# Patient Record
Sex: MALE | Race: WHITE | Employment: OTHER | URBAN - METROPOLITAN AREA
[De-identification: names, ages, dates, MRNs, and addresses within clinical notes are randomized per-mention and may not be internally consistent; named-entity substitution may affect disease eponyms.]

---

## 2021-09-01 ENCOUNTER — APPOINTMENT (OUTPATIENT)
Dept: CT IMAGING | Age: 76
DRG: 640 | End: 2021-09-01
Payer: MEDICARE

## 2021-09-01 ENCOUNTER — HOSPITAL ENCOUNTER (INPATIENT)
Age: 76
LOS: 2 days | Discharge: HOME | DRG: 640 | End: 2021-09-03
Attending: STUDENT IN AN ORGANIZED HEALTH CARE EDUCATION/TRAINING PROGRAM | Admitting: INTERNAL MEDICINE
Payer: MEDICARE

## 2021-09-01 ENCOUNTER — APPOINTMENT (OUTPATIENT)
Dept: GENERAL RADIOLOGY | Age: 76
DRG: 640 | End: 2021-09-01
Payer: MEDICARE

## 2021-09-01 DIAGNOSIS — R41.82 ALTERED MENTAL STATUS, UNSPECIFIED ALTERED MENTAL STATUS TYPE: Primary | ICD-10-CM

## 2021-09-01 DIAGNOSIS — E87.1 HYPONATREMIA: ICD-10-CM

## 2021-09-01 LAB
A/G RATIO: 1.4 (ref 1.1–2.2)
ALBUMIN SERPL-MCNC: 4.5 G/DL (ref 3.4–5)
ALP BLD-CCNC: 70 U/L (ref 40–129)
ALT SERPL-CCNC: 15 U/L (ref 10–40)
AMMONIA: 43 UMOL/L (ref 16–60)
AMPHETAMINE SCREEN, URINE: NORMAL
ANION GAP SERPL CALCULATED.3IONS-SCNC: 13 MMOL/L (ref 3–16)
ANION GAP SERPL CALCULATED.3IONS-SCNC: 14 MMOL/L (ref 3–16)
AST SERPL-CCNC: 30 U/L (ref 15–37)
BARBITURATE SCREEN URINE: NORMAL
BASE EXCESS VENOUS: -0.7 MMOL/L (ref -3–3)
BASOPHILS ABSOLUTE: 0 K/UL (ref 0–0.2)
BASOPHILS RELATIVE PERCENT: 0.4 %
BENZODIAZEPINE SCREEN, URINE: NORMAL
BILIRUB SERPL-MCNC: 0.8 MG/DL (ref 0–1)
BILIRUBIN URINE: NEGATIVE
BLOOD, URINE: NEGATIVE
BUN BLDV-MCNC: 13 MG/DL (ref 7–20)
BUN BLDV-MCNC: 9 MG/DL (ref 7–20)
CALCIUM SERPL-MCNC: 9.1 MG/DL (ref 8.3–10.6)
CALCIUM SERPL-MCNC: 9.5 MG/DL (ref 8.3–10.6)
CANNABINOID SCREEN URINE: NORMAL
CARBOXYHEMOGLOBIN: 1.9 % (ref 0–1.5)
CHLORIDE BLD-SCNC: 84 MMOL/L (ref 99–110)
CHLORIDE BLD-SCNC: 90 MMOL/L (ref 99–110)
CLARITY: CLEAR
CO2: 24 MMOL/L (ref 21–32)
CO2: 25 MMOL/L (ref 21–32)
COCAINE METABOLITE SCREEN URINE: NORMAL
COLOR: YELLOW
CREAT SERPL-MCNC: 0.6 MG/DL (ref 0.8–1.3)
CREAT SERPL-MCNC: 0.7 MG/DL (ref 0.8–1.3)
EKG ATRIAL RATE: 108 BPM
EKG DIAGNOSIS: NORMAL
EKG P AXIS: 29 DEGREES
EKG P-R INTERVAL: 180 MS
EKG Q-T INTERVAL: 348 MS
EKG QRS DURATION: 116 MS
EKG QTC CALCULATION (BAZETT): 466 MS
EKG R AXIS: 42 DEGREES
EKG T AXIS: 34 DEGREES
EKG VENTRICULAR RATE: 108 BPM
EOSINOPHILS ABSOLUTE: 0 K/UL (ref 0–0.6)
EOSINOPHILS RELATIVE PERCENT: 0 %
ETHANOL: NORMAL MG/DL (ref 0–0.08)
FOLATE: 15.51 NG/ML (ref 4.78–24.2)
GFR AFRICAN AMERICAN: >60
GFR AFRICAN AMERICAN: >60
GFR NON-AFRICAN AMERICAN: >60
GFR NON-AFRICAN AMERICAN: >60
GLOBULIN: 3.2 G/DL
GLUCOSE BLD-MCNC: 129 MG/DL (ref 70–99)
GLUCOSE BLD-MCNC: 143 MG/DL (ref 70–99)
GLUCOSE URINE: NEGATIVE MG/DL
HCO3 VENOUS: 23.1 MMOL/L (ref 23–29)
HCT VFR BLD CALC: 37.3 % (ref 40.5–52.5)
HEMOGLOBIN: 12.8 G/DL (ref 13.5–17.5)
INFLUENZA A: NOT DETECTED
INFLUENZA B: NOT DETECTED
KETONES, URINE: 15 MG/DL
LEUKOCYTE ESTERASE, URINE: NEGATIVE
LYMPHOCYTES ABSOLUTE: 0.2 K/UL (ref 1–5.1)
LYMPHOCYTES RELATIVE PERCENT: 3.9 %
Lab: NORMAL
MCH RBC QN AUTO: 34.6 PG (ref 26–34)
MCHC RBC AUTO-ENTMCNC: 34.5 G/DL (ref 31–36)
MCV RBC AUTO: 100.2 FL (ref 80–100)
METHADONE SCREEN, URINE: NORMAL
METHEMOGLOBIN VENOUS: 0.1 %
MICROSCOPIC EXAMINATION: ABNORMAL
MONOCYTES ABSOLUTE: 0.8 K/UL (ref 0–1.3)
MONOCYTES RELATIVE PERCENT: 13.4 %
NEUTROPHILS ABSOLUTE: 4.6 K/UL (ref 1.7–7.7)
NEUTROPHILS RELATIVE PERCENT: 82.3 %
NITRITE, URINE: NEGATIVE
O2 SAT, VEN: 97 %
O2 THERAPY: ABNORMAL
OPIATE SCREEN URINE: NORMAL
OXYCODONE URINE: NORMAL
PCO2, VEN: 35.8 MMHG (ref 40–50)
PDW BLD-RTO: 13.2 % (ref 12.4–15.4)
PH UA: 6.5
PH UA: 6.5 (ref 5–8)
PH VENOUS: 7.43 (ref 7.35–7.45)
PHENCYCLIDINE SCREEN URINE: NORMAL
PLATELET # BLD: 208 K/UL (ref 135–450)
PMV BLD AUTO: 6.4 FL (ref 5–10.5)
PO2, VEN: 84.7 MMHG (ref 25–40)
POTASSIUM REFLEX MAGNESIUM: 3.8 MMOL/L (ref 3.5–5.1)
POTASSIUM REFLEX MAGNESIUM: 4.3 MMOL/L (ref 3.5–5.1)
PROPOXYPHENE SCREEN: NORMAL
PROTEIN UA: NEGATIVE MG/DL
RBC # BLD: 3.72 M/UL (ref 4.2–5.9)
SARS-COV-2 RNA, RT PCR: NOT DETECTED
SARS-COV-2, NAAT: NOT DETECTED
SODIUM BLD-SCNC: 123 MMOL/L (ref 136–145)
SODIUM BLD-SCNC: 127 MMOL/L (ref 136–145)
SODIUM URINE: 57 MMOL/L
SPECIFIC GRAVITY UA: 1.01 (ref 1–1.03)
TCO2 CALC VENOUS: 24 MMOL/L
TOTAL PROTEIN: 7.7 G/DL (ref 6.4–8.2)
TROPONIN: <0.01 NG/ML
TSH REFLEX: 1.32 UIU/ML (ref 0.27–4.2)
URIC ACID, SERUM: 6.5 MG/DL (ref 3.5–7.2)
URINE TYPE: ABNORMAL
UROBILINOGEN, URINE: 0.2 E.U./DL
VITAMIN B-12: 694 PG/ML (ref 211–911)
WBC # BLD: 5.6 K/UL (ref 4–11)

## 2021-09-01 PROCEDURE — 82803 BLOOD GASES ANY COMBINATION: CPT

## 2021-09-01 PROCEDURE — 80053 COMPREHEN METABOLIC PANEL: CPT

## 2021-09-01 PROCEDURE — 84550 ASSAY OF BLOOD/URIC ACID: CPT

## 2021-09-01 PROCEDURE — 96360 HYDRATION IV INFUSION INIT: CPT

## 2021-09-01 PROCEDURE — 84484 ASSAY OF TROPONIN QUANT: CPT

## 2021-09-01 PROCEDURE — 6370000000 HC RX 637 (ALT 250 FOR IP): Performed by: NURSE PRACTITIONER

## 2021-09-01 PROCEDURE — 6370000000 HC RX 637 (ALT 250 FOR IP): Performed by: PHYSICIAN ASSISTANT

## 2021-09-01 PROCEDURE — 99222 1ST HOSP IP/OBS MODERATE 55: CPT | Performed by: NURSE PRACTITIONER

## 2021-09-01 PROCEDURE — 82077 ASSAY SPEC XCP UR&BREATH IA: CPT

## 2021-09-01 PROCEDURE — 6360000002 HC RX W HCPCS: Performed by: PHYSICIAN ASSISTANT

## 2021-09-01 PROCEDURE — 80307 DRUG TEST PRSMV CHEM ANLYZR: CPT

## 2021-09-01 PROCEDURE — 82607 VITAMIN B-12: CPT

## 2021-09-01 PROCEDURE — 2580000003 HC RX 258: Performed by: STUDENT IN AN ORGANIZED HEALTH CARE EDUCATION/TRAINING PROGRAM

## 2021-09-01 PROCEDURE — 87636 SARSCOV2 & INF A&B AMP PRB: CPT

## 2021-09-01 PROCEDURE — 72125 CT NECK SPINE W/O DYE: CPT

## 2021-09-01 PROCEDURE — 87635 SARS-COV-2 COVID-19 AMP PRB: CPT

## 2021-09-01 PROCEDURE — 93010 ELECTROCARDIOGRAM REPORT: CPT | Performed by: INTERNAL MEDICINE

## 2021-09-01 PROCEDURE — 70450 CT HEAD/BRAIN W/O DYE: CPT

## 2021-09-01 PROCEDURE — 2580000003 HC RX 258: Performed by: PHYSICIAN ASSISTANT

## 2021-09-01 PROCEDURE — 71045 X-RAY EXAM CHEST 1 VIEW: CPT

## 2021-09-01 PROCEDURE — 6370000000 HC RX 637 (ALT 250 FOR IP): Performed by: STUDENT IN AN ORGANIZED HEALTH CARE EDUCATION/TRAINING PROGRAM

## 2021-09-01 PROCEDURE — 85025 COMPLETE CBC W/AUTO DIFF WBC: CPT

## 2021-09-01 PROCEDURE — 82140 ASSAY OF AMMONIA: CPT

## 2021-09-01 PROCEDURE — 84300 ASSAY OF URINE SODIUM: CPT

## 2021-09-01 PROCEDURE — 36415 COLL VENOUS BLD VENIPUNCTURE: CPT

## 2021-09-01 PROCEDURE — 99283 EMERGENCY DEPT VISIT LOW MDM: CPT

## 2021-09-01 PROCEDURE — 2060000000 HC ICU INTERMEDIATE R&B

## 2021-09-01 PROCEDURE — 93005 ELECTROCARDIOGRAM TRACING: CPT | Performed by: STUDENT IN AN ORGANIZED HEALTH CARE EDUCATION/TRAINING PROGRAM

## 2021-09-01 PROCEDURE — 84443 ASSAY THYROID STIM HORMONE: CPT

## 2021-09-01 PROCEDURE — 81003 URINALYSIS AUTO W/O SCOPE: CPT

## 2021-09-01 PROCEDURE — 82746 ASSAY OF FOLIC ACID SERUM: CPT

## 2021-09-01 RX ORDER — PRAVASTATIN SODIUM 40 MG
40 TABLET ORAL DAILY
Status: DISCONTINUED | OUTPATIENT
Start: 2021-09-01 | End: 2021-09-03 | Stop reason: HOSPADM

## 2021-09-01 RX ORDER — ATENOLOL 25 MG/1
25 TABLET ORAL DAILY
COMMUNITY

## 2021-09-01 RX ORDER — ONDANSETRON 2 MG/ML
4 INJECTION INTRAMUSCULAR; INTRAVENOUS EVERY 6 HOURS PRN
Status: DISCONTINUED | OUTPATIENT
Start: 2021-09-01 | End: 2021-09-03 | Stop reason: HOSPADM

## 2021-09-01 RX ORDER — SODIUM CHLORIDE 0.9 % (FLUSH) 0.9 %
5-40 SYRINGE (ML) INJECTION PRN
Status: DISCONTINUED | OUTPATIENT
Start: 2021-09-01 | End: 2021-09-03 | Stop reason: HOSPADM

## 2021-09-01 RX ORDER — ONDANSETRON 4 MG/1
4 TABLET, ORALLY DISINTEGRATING ORAL EVERY 8 HOURS PRN
Status: DISCONTINUED | OUTPATIENT
Start: 2021-09-01 | End: 2021-09-03 | Stop reason: HOSPADM

## 2021-09-01 RX ORDER — MAGNESIUM SULFATE IN WATER 40 MG/ML
2000 INJECTION, SOLUTION INTRAVENOUS PRN
Status: DISCONTINUED | OUTPATIENT
Start: 2021-09-01 | End: 2021-09-03 | Stop reason: HOSPADM

## 2021-09-01 RX ORDER — ACETAMINOPHEN 500 MG
1000 TABLET ORAL ONCE
Status: COMPLETED | OUTPATIENT
Start: 2021-09-01 | End: 2021-09-01

## 2021-09-01 RX ORDER — SODIUM CHLORIDE 9 MG/ML
25 INJECTION, SOLUTION INTRAVENOUS PRN
Status: DISCONTINUED | OUTPATIENT
Start: 2021-09-01 | End: 2021-09-03 | Stop reason: HOSPADM

## 2021-09-01 RX ORDER — LISINOPRIL 20 MG/1
20 TABLET ORAL DAILY
Status: ON HOLD | COMMUNITY
End: 2021-09-03 | Stop reason: HOSPADM

## 2021-09-01 RX ORDER — LORAZEPAM 2 MG/1
4 TABLET ORAL
Status: DISCONTINUED | OUTPATIENT
Start: 2021-09-01 | End: 2021-09-03 | Stop reason: HOSPADM

## 2021-09-01 RX ORDER — POTASSIUM CHLORIDE 7.45 MG/ML
10 INJECTION INTRAVENOUS PRN
Status: DISCONTINUED | OUTPATIENT
Start: 2021-09-01 | End: 2021-09-03 | Stop reason: HOSPADM

## 2021-09-01 RX ORDER — ACETAMINOPHEN 650 MG/1
650 SUPPOSITORY RECTAL EVERY 6 HOURS PRN
Status: DISCONTINUED | OUTPATIENT
Start: 2021-09-01 | End: 2021-09-03 | Stop reason: HOSPADM

## 2021-09-01 RX ORDER — ALBUTEROL SULFATE 90 UG/1
2 AEROSOL, METERED RESPIRATORY (INHALATION) EVERY 6 HOURS PRN
COMMUNITY

## 2021-09-01 RX ORDER — ATENOLOL 25 MG/1
25 TABLET ORAL DAILY
Status: DISCONTINUED | OUTPATIENT
Start: 2021-09-01 | End: 2021-09-03 | Stop reason: HOSPADM

## 2021-09-01 RX ORDER — AMLODIPINE BESYLATE 5 MG/1
5 TABLET ORAL DAILY
Status: ON HOLD | COMMUNITY
End: 2021-09-03 | Stop reason: HOSPADM

## 2021-09-01 RX ORDER — SODIUM CHLORIDE 9 MG/ML
INJECTION, SOLUTION INTRAVENOUS CONTINUOUS
Status: DISCONTINUED | OUTPATIENT
Start: 2021-09-01 | End: 2021-09-03

## 2021-09-01 RX ORDER — LORAZEPAM 2 MG/ML
3 INJECTION INTRAMUSCULAR
Status: DISCONTINUED | OUTPATIENT
Start: 2021-09-01 | End: 2021-09-03 | Stop reason: HOSPADM

## 2021-09-01 RX ORDER — POTASSIUM CHLORIDE 20 MEQ/1
40 TABLET, EXTENDED RELEASE ORAL PRN
Status: DISCONTINUED | OUTPATIENT
Start: 2021-09-01 | End: 2021-09-03 | Stop reason: HOSPADM

## 2021-09-01 RX ORDER — POLYETHYLENE GLYCOL 3350 17 G/17G
17 POWDER, FOR SOLUTION ORAL DAILY PRN
Status: DISCONTINUED | OUTPATIENT
Start: 2021-09-01 | End: 2021-09-03 | Stop reason: HOSPADM

## 2021-09-01 RX ORDER — LANOLIN ALCOHOL/MO/W.PET/CERES
100 CREAM (GRAM) TOPICAL DAILY
Status: DISCONTINUED | OUTPATIENT
Start: 2021-09-01 | End: 2021-09-03 | Stop reason: HOSPADM

## 2021-09-01 RX ORDER — 0.9 % SODIUM CHLORIDE 0.9 %
1000 INTRAVENOUS SOLUTION INTRAVENOUS ONCE
Status: COMPLETED | OUTPATIENT
Start: 2021-09-01 | End: 2021-09-01

## 2021-09-01 RX ORDER — ACETAMINOPHEN 325 MG/1
650 TABLET ORAL EVERY 6 HOURS PRN
Status: DISCONTINUED | OUTPATIENT
Start: 2021-09-01 | End: 2021-09-03 | Stop reason: HOSPADM

## 2021-09-01 RX ORDER — LORAZEPAM 2 MG/ML
1 INJECTION INTRAMUSCULAR
Status: DISCONTINUED | OUTPATIENT
Start: 2021-09-01 | End: 2021-09-03 | Stop reason: HOSPADM

## 2021-09-01 RX ORDER — SODIUM CHLORIDE 0.9 % (FLUSH) 0.9 %
5-40 SYRINGE (ML) INJECTION EVERY 12 HOURS SCHEDULED
Status: DISCONTINUED | OUTPATIENT
Start: 2021-09-01 | End: 2021-09-03 | Stop reason: HOSPADM

## 2021-09-01 RX ORDER — M-VIT,TX,IRON,MINS/CALC/FOLIC 27MG-0.4MG
1 TABLET ORAL DAILY
Status: DISCONTINUED | OUTPATIENT
Start: 2021-09-01 | End: 2021-09-03 | Stop reason: HOSPADM

## 2021-09-01 RX ORDER — PRAVASTATIN SODIUM 40 MG
40 TABLET ORAL DAILY
COMMUNITY

## 2021-09-01 RX ORDER — LORAZEPAM 1 MG/1
1 TABLET ORAL
Status: DISCONTINUED | OUTPATIENT
Start: 2021-09-01 | End: 2021-09-03 | Stop reason: HOSPADM

## 2021-09-01 RX ORDER — LORAZEPAM 2 MG/ML
2 INJECTION INTRAMUSCULAR
Status: DISCONTINUED | OUTPATIENT
Start: 2021-09-01 | End: 2021-09-03 | Stop reason: HOSPADM

## 2021-09-01 RX ORDER — LORAZEPAM 2 MG/1
2 TABLET ORAL
Status: DISCONTINUED | OUTPATIENT
Start: 2021-09-01 | End: 2021-09-03 | Stop reason: HOSPADM

## 2021-09-01 RX ORDER — LORAZEPAM 2 MG/ML
4 INJECTION INTRAMUSCULAR
Status: DISCONTINUED | OUTPATIENT
Start: 2021-09-01 | End: 2021-09-03 | Stop reason: HOSPADM

## 2021-09-01 RX ORDER — ALBUTEROL SULFATE 90 UG/1
2 AEROSOL, METERED RESPIRATORY (INHALATION) EVERY 6 HOURS PRN
Status: DISCONTINUED | OUTPATIENT
Start: 2021-09-01 | End: 2021-09-03 | Stop reason: HOSPADM

## 2021-09-01 RX ADMIN — SODIUM CHLORIDE 1000 ML: 9 INJECTION, SOLUTION INTRAVENOUS at 09:41

## 2021-09-01 RX ADMIN — Medication 100 MG: at 17:32

## 2021-09-01 RX ADMIN — PRAVASTATIN SODIUM 40 MG: 40 TABLET ORAL at 17:32

## 2021-09-01 RX ADMIN — SODIUM CHLORIDE: 9 INJECTION, SOLUTION INTRAVENOUS at 15:46

## 2021-09-01 RX ADMIN — ACETAMINOPHEN 650 MG: 325 TABLET ORAL at 17:32

## 2021-09-01 RX ADMIN — ATENOLOL 25 MG: 25 TABLET ORAL at 17:32

## 2021-09-01 RX ADMIN — ACETAMINOPHEN 1000 MG: 500 TABLET ORAL at 12:48

## 2021-09-01 RX ADMIN — ENOXAPARIN SODIUM 40 MG: 40 INJECTION SUBCUTANEOUS at 17:32

## 2021-09-01 RX ADMIN — MULTIPLE VITAMINS W/ MINERALS TAB 1 TABLET: TAB at 17:33

## 2021-09-01 ASSESSMENT — PAIN SCALES - GENERAL
PAINLEVEL_OUTOF10: 1
PAINLEVEL_OUTOF10: 9
PAINLEVEL_OUTOF10: 4
PAINLEVEL_OUTOF10: 0
PAINLEVEL_OUTOF10: 3

## 2021-09-01 NOTE — ED NOTES
1054- Perfect serve sent to Dr. Mary Kay Adrian for admission      [facility]  09/01/21 1056      Call was returned by Pan Luciano NP and spoke to Dr. Stephanie Jameson  09/01/21 0879

## 2021-09-01 NOTE — PROGRESS NOTES
Patient transferred from ED to room 317. Positioned for comfort in bed. Four eyes assessment complete. No impaired skin integrity or open area. Scattered bruising and blanchable redness to coccyx noted. Assessment complete. Patient oriented to room. VS obtained. Call light in reach. Bed alarm on.

## 2021-09-01 NOTE — Clinical Note
Patient Class: Inpatient [101]   REQUIRED: Diagnosis: Hyponatremia [291572]   Estimated Length of Stay: Estimated stay of more than 2 midnights   Telemetry/Cardiac Monitoring Required?: Yes
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
## 2021-09-02 LAB
ANION GAP SERPL CALCULATED.3IONS-SCNC: 10 MMOL/L (ref 3–16)
ANION GAP SERPL CALCULATED.3IONS-SCNC: 14 MMOL/L (ref 3–16)
ANION GAP SERPL CALCULATED.3IONS-SCNC: 9 MMOL/L (ref 3–16)
BASOPHILS ABSOLUTE: 0 K/UL (ref 0–0.2)
BASOPHILS RELATIVE PERCENT: 0.7 %
BUN BLDV-MCNC: 10 MG/DL (ref 7–20)
BUN BLDV-MCNC: 8 MG/DL (ref 7–20)
BUN BLDV-MCNC: 9 MG/DL (ref 7–20)
CALCIUM SERPL-MCNC: 8.6 MG/DL (ref 8.3–10.6)
CALCIUM SERPL-MCNC: 8.8 MG/DL (ref 8.3–10.6)
CALCIUM SERPL-MCNC: 9 MG/DL (ref 8.3–10.6)
CHLORIDE BLD-SCNC: 92 MMOL/L (ref 99–110)
CHLORIDE BLD-SCNC: 94 MMOL/L (ref 99–110)
CHLORIDE BLD-SCNC: 95 MMOL/L (ref 99–110)
CO2: 19 MMOL/L (ref 21–32)
CO2: 23 MMOL/L (ref 21–32)
CO2: 23 MMOL/L (ref 21–32)
CREAT SERPL-MCNC: 0.6 MG/DL (ref 0.8–1.3)
EOSINOPHILS ABSOLUTE: 0 K/UL (ref 0–0.6)
EOSINOPHILS RELATIVE PERCENT: 0.7 %
GFR AFRICAN AMERICAN: >60
GFR NON-AFRICAN AMERICAN: >60
GLUCOSE BLD-MCNC: 113 MG/DL (ref 70–99)
GLUCOSE BLD-MCNC: 130 MG/DL (ref 70–99)
GLUCOSE BLD-MCNC: 84 MG/DL (ref 70–99)
HCT VFR BLD CALC: 32.8 % (ref 40.5–52.5)
HEMOGLOBIN: 11.5 G/DL (ref 13.5–17.5)
LYMPHOCYTES ABSOLUTE: 0.6 K/UL (ref 1–5.1)
LYMPHOCYTES RELATIVE PERCENT: 14.3 %
MAGNESIUM: 1.6 MG/DL (ref 1.8–2.4)
MCH RBC QN AUTO: 35.6 PG (ref 26–34)
MCHC RBC AUTO-ENTMCNC: 34.9 G/DL (ref 31–36)
MCV RBC AUTO: 102 FL (ref 80–100)
MONOCYTES ABSOLUTE: 0.7 K/UL (ref 0–1.3)
MONOCYTES RELATIVE PERCENT: 16.5 %
NEUTROPHILS ABSOLUTE: 3 K/UL (ref 1.7–7.7)
NEUTROPHILS RELATIVE PERCENT: 67.8 %
OSMOLALITY URINE: 417 MOSM/KG (ref 390–1070)
PDW BLD-RTO: 13.4 % (ref 12.4–15.4)
PLATELET # BLD: 172 K/UL (ref 135–450)
PMV BLD AUTO: 6.4 FL (ref 5–10.5)
POTASSIUM REFLEX MAGNESIUM: 3.5 MMOL/L (ref 3.5–5.1)
POTASSIUM SERPL-SCNC: 3.6 MMOL/L (ref 3.5–5.1)
POTASSIUM SERPL-SCNC: 3.8 MMOL/L (ref 3.5–5.1)
RBC # BLD: 3.21 M/UL (ref 4.2–5.9)
SODIUM BLD-SCNC: 125 MMOL/L (ref 136–145)
SODIUM BLD-SCNC: 127 MMOL/L (ref 136–145)
SODIUM BLD-SCNC: 127 MMOL/L (ref 136–145)
WBC # BLD: 4.4 K/UL (ref 4–11)

## 2021-09-02 PROCEDURE — 97530 THERAPEUTIC ACTIVITIES: CPT

## 2021-09-02 PROCEDURE — 97162 PT EVAL MOD COMPLEX 30 MIN: CPT

## 2021-09-02 PROCEDURE — 99232 SBSQ HOSP IP/OBS MODERATE 35: CPT | Performed by: INTERNAL MEDICINE

## 2021-09-02 PROCEDURE — 80048 BASIC METABOLIC PNL TOTAL CA: CPT

## 2021-09-02 PROCEDURE — 83935 ASSAY OF URINE OSMOLALITY: CPT

## 2021-09-02 PROCEDURE — 83735 ASSAY OF MAGNESIUM: CPT

## 2021-09-02 PROCEDURE — 6360000002 HC RX W HCPCS: Performed by: PHYSICIAN ASSISTANT

## 2021-09-02 PROCEDURE — 97166 OT EVAL MOD COMPLEX 45 MIN: CPT

## 2021-09-02 PROCEDURE — 36415 COLL VENOUS BLD VENIPUNCTURE: CPT

## 2021-09-02 PROCEDURE — 97535 SELF CARE MNGMENT TRAINING: CPT

## 2021-09-02 PROCEDURE — 2060000000 HC ICU INTERMEDIATE R&B

## 2021-09-02 PROCEDURE — 2580000003 HC RX 258: Performed by: PHYSICIAN ASSISTANT

## 2021-09-02 PROCEDURE — 97116 GAIT TRAINING THERAPY: CPT

## 2021-09-02 PROCEDURE — 6370000000 HC RX 637 (ALT 250 FOR IP): Performed by: NURSE PRACTITIONER

## 2021-09-02 PROCEDURE — 85025 COMPLETE CBC W/AUTO DIFF WBC: CPT

## 2021-09-02 RX ADMIN — Medication 10 ML: at 08:55

## 2021-09-02 RX ADMIN — SODIUM CHLORIDE: 9 INJECTION, SOLUTION INTRAVENOUS at 12:55

## 2021-09-02 RX ADMIN — MULTIPLE VITAMINS W/ MINERALS TAB 1 TABLET: TAB at 08:55

## 2021-09-02 RX ADMIN — Medication 10 ML: at 21:21

## 2021-09-02 RX ADMIN — SODIUM CHLORIDE 25 ML: 9 INJECTION, SOLUTION INTRAVENOUS at 00:39

## 2021-09-02 RX ADMIN — MAGNESIUM SULFATE HEPTAHYDRATE 2000 MG: 40 INJECTION, SOLUTION INTRAVENOUS at 00:42

## 2021-09-02 RX ADMIN — PRAVASTATIN SODIUM 40 MG: 40 TABLET ORAL at 08:55

## 2021-09-02 RX ADMIN — Medication 100 MG: at 08:55

## 2021-09-02 RX ADMIN — ENOXAPARIN SODIUM 40 MG: 40 INJECTION SUBCUTANEOUS at 08:55

## 2021-09-02 ASSESSMENT — PAIN SCALES - GENERAL
PAINLEVEL_OUTOF10: 0
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
## 2021-09-02 NOTE — PROGRESS NOTES
Bedside report given to Farrukh Dos Santos, [address] [facility]. Patient assisted back to bed. Bed alarm on. Care transferred.

## 2021-09-02 NOTE — ACP (ADVANCE CARE PLANNING)
Advance Care Planning   Healthcare Decision Maker:    Primary Decision Maker: Cyntha Klinefelter - Child - [phone]    Click here to complete Healthcare Decision Makers including selection of the Healthcare Decision Maker Relationship (ie \"Primary\").

## 2021-09-02 NOTE — FLOWSHEET NOTE
09/01/21 1951   Provider Notification   Reason for Communication Evaluate  (sodium level 127)   Provider Name Dr. Zac Hale   Provider Notification Physician   Method of Communication Secure Message   Response Waiting for response   Notification Time 1951     New orders (below) per Dr. Zac Hale:  -Nephro consult   -BMP @ 0000  - nursing communication: If Na is >= 128 stop NS IVF.  If > 131 notify on call nephro.
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
## 2021-09-02 NOTE — PROGRESS NOTES
Per Dr. Woody Gordillo, patient does not need to be seen by nephrology. Consult canceled at this time.

## 2021-09-03 VITALS
WEIGHT: 115.7 LBS | TEMPERATURE: 97.3 F | SYSTOLIC BLOOD PRESSURE: 121 MMHG | BODY MASS INDEX: 19.28 KG/M2 | DIASTOLIC BLOOD PRESSURE: 77 MMHG | HEART RATE: 98 BPM | HEIGHT: 65 IN | OXYGEN SATURATION: 93 % | RESPIRATION RATE: 18 BRPM

## 2021-09-03 LAB
ANION GAP SERPL CALCULATED.3IONS-SCNC: 10 MMOL/L (ref 3–16)
ANION GAP SERPL CALCULATED.3IONS-SCNC: 12 MMOL/L (ref 3–16)
BUN BLDV-MCNC: 6 MG/DL (ref 7–20)
BUN BLDV-MCNC: 8 MG/DL (ref 7–20)
CALCIUM SERPL-MCNC: 8.7 MG/DL (ref 8.3–10.6)
CALCIUM SERPL-MCNC: 8.8 MG/DL (ref 8.3–10.6)
CHLORIDE BLD-SCNC: 95 MMOL/L (ref 99–110)
CHLORIDE BLD-SCNC: 97 MMOL/L (ref 99–110)
CO2: 23 MMOL/L (ref 21–32)
CO2: 23 MMOL/L (ref 21–32)
CREAT SERPL-MCNC: 0.6 MG/DL (ref 0.8–1.3)
CREAT SERPL-MCNC: 0.7 MG/DL (ref 0.8–1.3)
GFR AFRICAN AMERICAN: >60
GFR AFRICAN AMERICAN: >60
GFR NON-AFRICAN AMERICAN: >60
GFR NON-AFRICAN AMERICAN: >60
GLUCOSE BLD-MCNC: 178 MG/DL (ref 70–99)
GLUCOSE BLD-MCNC: 78 MG/DL (ref 70–99)
POTASSIUM SERPL-SCNC: 3.5 MMOL/L (ref 3.5–5.1)
POTASSIUM SERPL-SCNC: 3.6 MMOL/L (ref 3.5–5.1)
SODIUM BLD-SCNC: 128 MMOL/L (ref 136–145)
SODIUM BLD-SCNC: 132 MMOL/L (ref 136–145)

## 2021-09-03 PROCEDURE — 6370000000 HC RX 637 (ALT 250 FOR IP): Performed by: NURSE PRACTITIONER

## 2021-09-03 PROCEDURE — 94640 AIRWAY INHALATION TREATMENT: CPT

## 2021-09-03 PROCEDURE — 6370000000 HC RX 637 (ALT 250 FOR IP): Performed by: INTERNAL MEDICINE

## 2021-09-03 PROCEDURE — 80048 BASIC METABOLIC PNL TOTAL CA: CPT

## 2021-09-03 PROCEDURE — 36415 COLL VENOUS BLD VENIPUNCTURE: CPT

## 2021-09-03 PROCEDURE — 6360000002 HC RX W HCPCS: Performed by: PHYSICIAN ASSISTANT

## 2021-09-03 PROCEDURE — 99238 HOSP IP/OBS DSCHRG MGMT 30/<: CPT | Performed by: INTERNAL MEDICINE

## 2021-09-03 RX ORDER — FOLIC ACID 1 MG/1
1 TABLET ORAL DAILY
Qty: 30 TABLET | Refills: 0 | Status: SHIPPED | OUTPATIENT
Start: 2021-09-03

## 2021-09-03 RX ORDER — M-VIT,TX,IRON,MINS/CALC/FOLIC 27MG-0.4MG
1 TABLET ORAL DAILY
Qty: 30 TABLET | Refills: 0 | Status: SHIPPED | OUTPATIENT
Start: 2021-09-04

## 2021-09-03 RX ORDER — IPRATROPIUM BROMIDE AND ALBUTEROL SULFATE 2.5; .5 MG/3ML; MG/3ML
1 SOLUTION RESPIRATORY (INHALATION) EVERY 4 HOURS PRN
Status: DISCONTINUED | OUTPATIENT
Start: 2021-09-03 | End: 2021-09-03 | Stop reason: HOSPADM

## 2021-09-03 RX ORDER — LANOLIN ALCOHOL/MO/W.PET/CERES
100 CREAM (GRAM) TOPICAL DAILY
Qty: 30 TABLET | Refills: 0 | Status: SHIPPED | OUTPATIENT
Start: 2021-09-04

## 2021-09-03 RX ADMIN — ENOXAPARIN SODIUM 40 MG: 40 INJECTION SUBCUTANEOUS at 08:37

## 2021-09-03 RX ADMIN — PRAVASTATIN SODIUM 40 MG: 40 TABLET ORAL at 08:37

## 2021-09-03 RX ADMIN — IPRATROPIUM BROMIDE AND ALBUTEROL SULFATE 1 AMPULE: .5; 2.5 SOLUTION RESPIRATORY (INHALATION) at 10:54

## 2021-09-03 RX ADMIN — MULTIPLE VITAMINS W/ MINERALS TAB 1 TABLET: TAB at 08:37

## 2021-09-03 RX ADMIN — Medication 100 MG: at 08:37

## 2021-09-03 RX ADMIN — ATENOLOL 25 MG: 25 TABLET ORAL at 08:37

## 2021-09-03 NOTE — FLOWSHEET NOTE
09/03/21 0836   Vital Signs   Temp 97.3 °F (36.3 °C)   Temp Source Oral   Pulse 98   Resp 16   /77   BP Location Left upper arm   Level of Consciousness Alert (0)   MEWS Score 1   Oxygen Therapy   SpO2 94 %   O2 Device None (Room air)     Patient sitting up in chair. No s/s of distress noted. Shift assessment complete, see flow sheet. Denies needs at this time. Call light in reach. Will monitor.

## 2021-09-03 NOTE — PROGRESS NOTES
Patient educated on discharge instructions as well as new medications use, dosage, administration and possible side effects. Patient verified knowledge. IV removed without difficulty and dry dressing in place. Telemetry monitor removed and returned to [facility]. Pt left facility in stable condition to Home with all of their personal belongings.

## 2021-09-03 NOTE — FLOWSHEET NOTE
09/02/21 2100   Vital Signs   Temp 96.9 °F (36.1 °C)   Temp Source Oral   Pulse 80   Heart Rate Source Monitor   Resp 16   /82   Level of Consciousness Alert (0)   MEWS Score 1   Pain Assessment   Pain Assessment 0-10   Pain Level 0   Oxygen Therapy   SpO2 95 %   O2 Device None (Room air)   Pt. Resting in bed. Call light in reach. Shift assessment completed see flow sheet. Denies any needs at this time. Will continue to monitor.
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
## 2021-09-03 NOTE — CARE COORDINATION
DISCHARGE ORDER  Date/Time 9/3/2021 11:50 AM  Completed by: Jaun Peck RN, Case Management    Patient Name: Lise Peguero      : 1945  Admitting Diagnosis: Hyponatremia [E87.1]  Altered mental status, unspecified altered mental status type [R41.82]      Admit order Date and Status:2021  (verify MD's last order for status of admission)      Noted discharge order. If applicable PT/OT recommendation at Discharge: SNF or 24/7 supervision i  [facility] recommendation by PT/OT:n/a  Confirmed discharge plan  (pt): Yes  with whom_______pt_____  If pt confirmed DC plan does family need to be contacted by CM no  if yes who______  Discharge Plan: Reviewed chart. Role of discharge planner explained and patient verbalized understanding. Discharge order is noted. Pt is being d/c'd to home with daughter today. Pt's O2 sats are 93% on RA. Pt plans to return home with his daughter in [place], [place] today. Pt is aware that PT/OT recommend SNF or 24/7 supervision and pt declines SNF. Declines HC at this time. No further discharge needs needed or noted. Reviewed chart. Role of discharge planner explained and patient verbalized understanding. Discharge order is noted. Has Home O2 in place on admit:  No  Informed of need to bring portable home O2 tank on day of discharge for nursing to connect prior to leaving:   Not Indicated  Verbalized agreement/Understanding:   Not Indicated    Discharge timeout done with Hanh Purdy RN. All discharge needs and concerns addressed.

## 2023-01-20 PROBLEM — R25.1 TREMOR: Status: ACTIVE | Noted: 2023-01-20

## 2023-01-20 PROBLEM — U07.1 COVID-19 VIRUS INFECTION: Status: ACTIVE | Noted: 2023-01-20

## 2023-01-20 PROBLEM — F10.10 ALCOHOL ABUSE: Status: ACTIVE | Noted: 2023-01-20

## 2023-01-20 PROBLEM — L21.9 SEBORRHEIC DERMATITIS: Status: ACTIVE | Noted: 2023-01-20

## 2023-01-20 PROBLEM — E78.5 HLD (HYPERLIPIDEMIA): Status: ACTIVE | Noted: 2023-01-20

## 2023-01-20 PROBLEM — J44.9 COPD (CHRONIC OBSTRUCTIVE PULMONARY DISEASE) (MULTI): Status: ACTIVE | Noted: 2023-01-20

## 2023-01-20 PROBLEM — E66.3 OVERWEIGHT WITH BODY MASS INDEX (BMI) OF 25 TO 25.9 IN ADULT: Status: ACTIVE | Noted: 2023-01-20
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
## 2023-01-20 PROBLEM — I10 HTN (HYPERTENSION): Status: ACTIVE | Noted: 2023-01-20

## 2023-01-20 PROBLEM — F10.939 ALCOHOL WITHDRAWAL (MULTI): Status: ACTIVE | Noted: 2023-01-20

## 2023-01-20 PROBLEM — M19.071: Status: ACTIVE | Noted: 2023-01-20

## 2023-01-20 RX ORDER — PRIMIDONE 50 MG/1
1 TABLET ORAL EVERY EVENING
COMMUNITY
Start: 2022-04-13 | End: 2023-04-13 | Stop reason: SDUPTHER

## 2023-01-20 RX ORDER — KETOCONAZOLE 20 MG/ML
SHAMPOO, SUSPENSION TOPICAL
COMMUNITY
Start: 2022-08-16 | End: 2023-07-13 | Stop reason: WASHOUT

## 2023-01-20 RX ORDER — ATENOLOL 50 MG/1
1 TABLET ORAL DAILY
COMMUNITY
Start: 2021-09-09 | End: 2023-08-30 | Stop reason: SDUPTHER

## 2023-01-20 RX ORDER — ALBUTEROL SULFATE 0.83 MG/ML
SOLUTION RESPIRATORY (INHALATION) DAILY
COMMUNITY
Start: 2021-09-09

## 2023-01-20 RX ORDER — FOLIC ACID 1 MG/1
1 TABLET ORAL DAILY
COMMUNITY
Start: 2021-09-09 | End: 2023-08-22 | Stop reason: SDUPTHER

## 2023-01-20 RX ORDER — MIRTAZAPINE 15 MG/1
1 TABLET, FILM COATED ORAL NIGHTLY
COMMUNITY
Start: 2021-09-09 | End: 2023-04-13 | Stop reason: SDUPTHER

## 2023-01-20 RX ORDER — ALBUTEROL SULFATE 90 UG/1
2 AEROSOL, METERED RESPIRATORY (INHALATION) EVERY 6 HOURS PRN
COMMUNITY
Start: 2021-09-09

## 2023-01-20 RX ORDER — LANOLIN ALCOHOL/MO/W.PET/CERES
1 CREAM (GRAM) TOPICAL DAILY
COMMUNITY
Start: 2021-09-09 | End: 2023-07-13 | Stop reason: WASHOUT

## 2023-01-20 RX ORDER — NAPROXEN 500 MG/1
1 TABLET ORAL
COMMUNITY
Start: 2022-08-16 | End: 2023-07-13 | Stop reason: WASHOUT

## 2023-01-20 RX ORDER — FLUTICASONE FUROATE AND VILANTEROL 100; 25 UG/1; UG/1
1 POWDER RESPIRATORY (INHALATION) DAILY
COMMUNITY
Start: 2022-08-25

## 2023-01-20 RX ORDER — AMMONIUM LACTATE 12 G/100G
LOTION TOPICAL 2 TIMES DAILY
COMMUNITY
Start: 2022-08-16

## 2023-01-20 RX ORDER — PRAVASTATIN SODIUM 40 MG/1
1 TABLET ORAL DAILY
COMMUNITY
Start: 2021-09-09 | End: 2023-10-04 | Stop reason: SDUPTHER

## 2023-03-02 LAB
ALANINE AMINOTRANSFERASE (SGPT) (U/L) IN SER/PLAS: 60 U/L (ref 10–52)
ALBUMIN (G/DL) IN SER/PLAS: 4.4 G/DL (ref 3.4–5)
ALKALINE PHOSPHATASE (U/L) IN SER/PLAS: 37 U/L (ref 33–136)
ANION GAP IN SER/PLAS: 10 MMOL/L (ref 10–20)
ASPARTATE AMINOTRANSFERASE (SGOT) (U/L) IN SER/PLAS: 40 U/L (ref 9–39)
BASOPHILS (10*3/UL) IN BLOOD BY AUTOMATED COUNT: 0.03 X10E9/L (ref 0–0.1)
BASOPHILS/100 LEUKOCYTES IN BLOOD BY AUTOMATED COUNT: 0.3 % (ref 0–2)
BILIRUBIN TOTAL (MG/DL) IN SER/PLAS: 0.3 MG/DL (ref 0–1.2)
CALCIUM (MG/DL) IN SER/PLAS: 9.1 MG/DL (ref 8.6–10.3)
CARBON DIOXIDE, TOTAL (MMOL/L) IN SER/PLAS: 32 MMOL/L (ref 21–32)
CHLORIDE (MMOL/L) IN SER/PLAS: 99 MMOL/L (ref 98–107)
CHOLESTEROL (MG/DL) IN SER/PLAS: 153 MG/DL (ref 0–199)
CHOLESTEROL IN HDL (MG/DL) IN SER/PLAS: 55.8 MG/DL
CHOLESTEROL/HDL RATIO: 2.7
CREATININE (MG/DL) IN SER/PLAS: 1.12 MG/DL (ref 0.5–1.3)
EOSINOPHILS (10*3/UL) IN BLOOD BY AUTOMATED COUNT: 0.22 X10E9/L (ref 0–0.4)
EOSINOPHILS/100 LEUKOCYTES IN BLOOD BY AUTOMATED COUNT: 2.2 % (ref 0–6)
ERYTHROCYTE DISTRIBUTION WIDTH (RATIO) BY AUTOMATED COUNT: 13.2 % (ref 11.5–14.5)
ERYTHROCYTE MEAN CORPUSCULAR HEMOGLOBIN CONCENTRATION (G/DL) BY AUTOMATED: 31.4 G/DL (ref 32–36)
ERYTHROCYTE MEAN CORPUSCULAR VOLUME (FL) BY AUTOMATED COUNT: 99 FL (ref 80–100)
ERYTHROCYTES (10*6/UL) IN BLOOD BY AUTOMATED COUNT: 4.44 X10E12/L (ref 4.5–5.9)
GFR MALE: 67 ML/MIN/1.73M2
GLUCOSE (MG/DL) IN SER/PLAS: 86 MG/DL (ref 74–99)
HEMATOCRIT (%) IN BLOOD BY AUTOMATED COUNT: 43.9 % (ref 41–52)
HEMOGLOBIN (G/DL) IN BLOOD: 13.8 G/DL (ref 13.5–17.5)
IMMATURE GRANULOCYTES/100 LEUKOCYTES IN BLOOD BY AUTOMATED COUNT: 1 % (ref 0–0.9)
LDL: 62 MG/DL (ref 0–99)
LEUKOCYTES (10*3/UL) IN BLOOD BY AUTOMATED COUNT: 9.8 X10E9/L (ref 4.4–11.3)
LYMPHOCYTES (10*3/UL) IN BLOOD BY AUTOMATED COUNT: 2.72 X10E9/L (ref 0.8–3)
LYMPHOCYTES/100 LEUKOCYTES IN BLOOD BY AUTOMATED COUNT: 27.8 % (ref 13–44)
MONOCYTES (10*3/UL) IN BLOOD BY AUTOMATED COUNT: 0.84 X10E9/L (ref 0.05–0.8)
MONOCYTES/100 LEUKOCYTES IN BLOOD BY AUTOMATED COUNT: 8.6 % (ref 2–10)
NEUTROPHILS (10*3/UL) IN BLOOD BY AUTOMATED COUNT: 5.87 X10E9/L (ref 1.6–5.5)
NEUTROPHILS/100 LEUKOCYTES IN BLOOD BY AUTOMATED COUNT: 60.1 % (ref 40–80)
PLATELETS (10*3/UL) IN BLOOD AUTOMATED COUNT: 231 X10E9/L (ref 150–450)
POTASSIUM (MMOL/L) IN SER/PLAS: 4.5 MMOL/L (ref 3.5–5.3)
PROTEIN TOTAL: 7.7 G/DL (ref 6.4–8.2)
SODIUM (MMOL/L) IN SER/PLAS: 136 MMOL/L (ref 136–145)
TRIGLYCERIDE (MG/DL) IN SER/PLAS: 174 MG/DL (ref 0–149)
UREA NITROGEN (MG/DL) IN SER/PLAS: 21 MG/DL (ref 6–23)
VLDL: 35 MG/DL (ref 0–40)

## 2023-04-12 NOTE — PROGRESS NOTES
Subjective   Patient ID: Rodrigo Conte is a 77 y.o. male who presents for No chief complaint on file..  Dyslipidemia and Hypertension:  Rodrigo Conte presents for evaluation of lipids. Compliance with treatment thus far has been good.  The patient exercises as tolerated.  Patient is here for follow-up of elevated blood pressure. Rodrigo Conte  is adherent to a low salt diet. Blood pressure is well controlled at home. Patient reports no side effects to antihypertensive medication. No new myalgias or GI upset on **  COPD  He presents for evaluation and treatment of COPD. {current exacerbation:20543} He uses {0-4:48145} pillows at night. Patient currently {gen oxygen:62270}. {Constitutional:82777} The patient {Has/has not:20894} been hospitalized for this condition before. {Smoking Hx:97514} {Exercise limits:20536}. Symptoms are alleviated by. Patient denies any side effects to the medications. The last hospitalization occurred *** ago.    There were no vitals taken for this visit.    Objective   Physical Exam

## 2023-04-13 ENCOUNTER — LAB (OUTPATIENT)
Dept: LAB | Facility: LAB | Age: 78
End: 2023-04-13
Payer: MEDICARE

## 2023-04-13 ENCOUNTER — OFFICE VISIT (OUTPATIENT)
Dept: PRIMARY CARE | Facility: CLINIC | Age: 78
End: 2023-04-13
Payer: MEDICARE

## 2023-04-13 VITALS
DIASTOLIC BLOOD PRESSURE: 68 MMHG | WEIGHT: 170.4 LBS | SYSTOLIC BLOOD PRESSURE: 124 MMHG | BODY MASS INDEX: 26.74 KG/M2 | HEIGHT: 67 IN | TEMPERATURE: 97.7 F | RESPIRATION RATE: 14 BRPM | HEART RATE: 76 BPM | OXYGEN SATURATION: 79 %

## 2023-04-13 DIAGNOSIS — E78.2 MIXED HYPERLIPIDEMIA: Primary | ICD-10-CM

## 2023-04-13 DIAGNOSIS — J44.9 CHRONIC OBSTRUCTIVE PULMONARY DISEASE, UNSPECIFIED COPD TYPE (MULTI): ICD-10-CM

## 2023-04-13 DIAGNOSIS — R79.89 ELEVATED LFTS: ICD-10-CM

## 2023-04-13 DIAGNOSIS — R25.1 TREMOR: ICD-10-CM

## 2023-04-13 PROBLEM — E66.3 OVERWEIGHT WITH BODY MASS INDEX (BMI) OF 25 TO 25.9 IN ADULT: Status: RESOLVED | Noted: 2023-01-20 | Resolved: 2023-04-13

## 2023-04-13 LAB
ALANINE AMINOTRANSFERASE (SGPT) (U/L) IN SER/PLAS: 14 U/L (ref 10–52)
ALBUMIN (G/DL) IN SER/PLAS: 4.3 G/DL (ref 3.4–5)
ALKALINE PHOSPHATASE (U/L) IN SER/PLAS: 37 U/L (ref 33–136)
ANION GAP IN SER/PLAS: 13 MMOL/L (ref 10–20)
ASPARTATE AMINOTRANSFERASE (SGOT) (U/L) IN SER/PLAS: 18 U/L (ref 9–39)
BILIRUBIN TOTAL (MG/DL) IN SER/PLAS: 0.3 MG/DL (ref 0–1.2)
CALCIUM (MG/DL) IN SER/PLAS: 9.4 MG/DL (ref 8.6–10.3)
CARBON DIOXIDE, TOTAL (MMOL/L) IN SER/PLAS: 32 MMOL/L (ref 21–32)
CHLORIDE (MMOL/L) IN SER/PLAS: 102 MMOL/L (ref 98–107)
CREATININE (MG/DL) IN SER/PLAS: 1.05 MG/DL (ref 0.5–1.3)
GFR MALE: 73 ML/MIN/1.73M2
GLUCOSE (MG/DL) IN SER/PLAS: 80 MG/DL (ref 74–99)
POTASSIUM (MMOL/L) IN SER/PLAS: 4.5 MMOL/L (ref 3.5–5.3)
PROTEIN TOTAL: 7.7 G/DL (ref 6.4–8.2)
SODIUM (MMOL/L) IN SER/PLAS: 142 MMOL/L (ref 136–145)
UREA NITROGEN (MG/DL) IN SER/PLAS: 27 MG/DL (ref 6–23)

## 2023-04-13 PROCEDURE — 3074F SYST BP LT 130 MM HG: CPT | Performed by: FAMILY MEDICINE

## 2023-04-13 PROCEDURE — 3078F DIAST BP <80 MM HG: CPT | Performed by: FAMILY MEDICINE

## 2023-04-13 PROCEDURE — 1159F MED LIST DOCD IN RCRD: CPT | Performed by: FAMILY MEDICINE

## 2023-04-13 PROCEDURE — 36415 COLL VENOUS BLD VENIPUNCTURE: CPT

## 2023-04-13 PROCEDURE — 99214 OFFICE O/P EST MOD 30 MIN: CPT | Performed by: FAMILY MEDICINE

## 2023-04-13 PROCEDURE — 1036F TOBACCO NON-USER: CPT | Performed by: FAMILY MEDICINE

## 2023-04-13 PROCEDURE — 80053 COMPREHEN METABOLIC PANEL: CPT

## 2023-04-13 RX ORDER — PRIMIDONE 50 MG/1
50 TABLET ORAL EVERY EVENING
Qty: 90 TABLET | Refills: 3 | Status: SHIPPED | OUTPATIENT
Start: 2023-04-13

## 2023-04-13 RX ORDER — MIRTAZAPINE 15 MG/1
15 TABLET, FILM COATED ORAL NIGHTLY
Qty: 90 TABLET | Refills: 3 | Status: SHIPPED | OUTPATIENT
Start: 2023-04-13

## 2023-04-13 NOTE — PROGRESS NOTES
"Subjective   Patient ID: Rodrigo Conte is a 77 y.o. male who presents for Hyperlipidemia and Lab results.    Past Medical, Surgical, and Family History reviewed and updated in chart.    Reviewed all medications by prescribing practitioner or clinical pharmacist (such as prescriptions, OTCs, herbal therapies and supplements) and documented in the medical record.    Hyperlipidemia: The patient is presenting in clinic today to go over lab results, his triglycerides are elevated.     COPD: The patient states that his breathing is stable. He is currently on 4 L of oxgen continuously.     The patient denies SOB, dizziness, headaches, nausea, vomiting, constipation, chest pain, chest pressure.    Patient Care Team:  John Gonzalez MD as PCP - General  John Gonzalez MD as PCP - [facility] Attributed Provider    Vitals:  /68   Pulse 76   Temp 36.5 °C (97.7 °F)   Resp 14   Ht 1.702 m (5' 7\")   Wt 77.3 kg (170 lb 6.4 oz)   SpO2 (!) 79% Comment: pt did not have his o2  BMI 26.69 kg/m²     Objective   Physical Exam  Vitals reviewed.   Constitutional:       Appearance: Normal appearance.   Neck:      Vascular: No carotid bruit.   Cardiovascular:      Rate and Rhythm: Normal rate and regular rhythm.      Pulses: Normal pulses.      Heart sounds: Normal heart sounds.   Pulmonary:      Effort: Pulmonary effort is normal. No respiratory distress.      Breath sounds: Normal breath sounds. No wheezing.   Abdominal:      General: There is no distension.      Palpations: Abdomen is soft. There is no mass.      Tenderness: There is no abdominal tenderness. There is no right CVA tenderness, left CVA tenderness, guarding or rebound.   Musculoskeletal:      Cervical back: Normal range of motion and neck supple. No rigidity.      Right lower leg: No edema.      Left lower leg: No edema.      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   Neurological:      Mental Status: He is alert.       Labs reviewed from:    Below are the patient's most recent values for Albumin, ALT, AST, BUN, Calcium, Chloride, Cholesterol, CO2, Creatinine, GFR, Glucose, HDL, Hematocrit, Hemoglobin, Hemoglobin A1C, LDL, Magnesium, Phosphorus, Platelets, Potassium, PSA, Sodium, Triglycerides, and WBC, are WNL.   Lab Results   Component Value Date    ALBUMIN 4.4 03/02/2023    ALT 60 (H) 03/02/2023    AST 40 (H) 03/02/2023    BUN 21 03/02/2023    CALCIUM 9.1 03/02/2023    CL 99 03/02/2023    CHOL 153 03/02/2023    CO2 32 03/02/2023    CREATININE 1.12 03/02/2023    HDL 55.8 03/02/2023    HCT 43.9 03/02/2023    HGB 13.8 03/02/2023    MG 2.10 07/06/2022     03/02/2023    K 4.5 03/02/2023     03/02/2023    TRIG 174 (H) 03/02/2023    WBC 9.8 03/02/2023     Assessment/Plan   Problem List Items Addressed This Visit          Respiratory    COPD (chronic obstructive pulmonary disease) (CMS/[facility])    Current Assessment & Plan      Is stable, continue with current treatment.  Is on 4 L of oxygen continuously.            Digestive    Elevated LFTs    Current Assessment & Plan     Is clinically stable so we will continue with current medications and lab work to confirm status ordered.             Other    HLD (hyperlipidemia) - Primary    Current Assessment & Plan      Is well controlled, continue with current medications.           Tremor    Current Assessment & Plan      Is well controlled, continue with current medications.            Follow up in:  3 months with labs today     Scribe Attestation  By signing my name below, IJulisa , Scribstanton   attest that this documentation has been prepared under the direction and in the presence of John Gonzalez MD.

## 2023-04-13 NOTE — ASSESSMENT & PLAN NOTE
Is clinically stable so we will continue with current medications and lab work to confirm status ordered.

## 2023-07-12 NOTE — PROGRESS NOTES
Subjective   Patient ID: Rodrigo Conte is a 78 y.o. male who presents for Hypertension, Knee Pain, COPD, and Hyperlipidemia.    Past Medical, Surgical, and Family History reviewed and updated in chart.     Reviewed all medications by prescribing practitioner or clinical pharmacist (such as prescriptions, OTCs, herbal therapies and supplements) and documented in the medical record.     Hypertension: The patient is here for follow-up of elevated blood pressure. He is adherent to a low salt diet.  The patient is compliant with medications. Patient denies any side effects to the medications.     Hyperlipidemia: The patient is present today for a follow up of hyperlipidemia. He denies having any leg cramping in particular. He is trying to follow a low-fat diet. The patient is compliant with medications, which they are currently taking the following Pravastatin 40 mg QHS. Patient denies any side effects to the medications.     COPD: The patient presents for a follow up of COPD. Patient is on 6L of oxygen continuously.  Patient will need a disabled parking placard because he is on oxygen and cannot cover 200 feet well.    Knee Pain: Patient presents today for a follow up of  with knee pain involving the  bilateral knee.       Patient Care Team:  John Gonzalez MD as PCP - General  [facility] Gonzalez MD as PCP - [facility]     Objective   Physical Exam  Vitals reviewed.   Constitutional:       Appearance: Normal appearance.      Comments: Patient on 6L of oxygen continuously.    HENT:      Right Ear: Tympanic membrane and ear canal normal.      Left Ear: Tympanic membrane and ear canal normal.      Mouth/Throat:      Pharynx: Oropharynx is clear.   Eyes:      Extraocular Movements: Extraocular movements intact.      Conjunctiva/sclera: Conjunctivae normal.      Pupils: Pupils are equal, round, and reactive to light.   Cardiovascular:      Rate and Rhythm: Normal rate and regular rhythm.      Heart sounds:  Normal heart sounds.   Pulmonary:      Effort: Pulmonary effort is normal. No respiratory distress.      Breath sounds: Normal breath sounds.   Abdominal:      General: There is no distension.      Palpations: There is no mass.      Tenderness: There is no abdominal tenderness. There is no guarding or rebound.      Hernia: No hernia is present.   Musculoskeletal:      Cervical back: Neck supple.   Skin:     General: Skin is warm and dry.   Neurological:      Mental Status: He is alert and oriented to person, place, and time.        Vitals:  /78   Pulse 80   Temp 36.9 °C (98.5 °F)   Wt 76.9 kg (169 lb 9.6 oz)   SpO2 (!) 88%   BMI 26.56 kg/m²     Allergies   Allergen Reactions    Gemfibrozil Other     Adverse reaction       Assessment/Plan   Problem List Items Addressed This Visit       COPD (chronic obstructive pulmonary disease) (CMS/HCC)      Is stable, continue with current treatment.   Is on 6L of oxygen continuously; increased from 4L last visit.         Relevant Orders    Follow Up In [facility] - PCP - Established    Disability Placard    HLD (hyperlipidemia)     Is clinically stable so we will continue with current medications and lab work to confirm status ordered.          Relevant Orders    Lipid Panel    HTN (hypertension) - Primary      Is well controlled, continue with current medications.          Relevant Orders    CBC and Auto Differential    Comprehensive Metabolic Panel    Magnesium    Elevated LFTs     Is clinically stable so we will continue with current medications and lab work to confirm status ordered.               Continue and take your medications as prescribed.    Health Maintenance issues discussed.    Importance of healthy diet and regular exercise regimen discussed.    We will contact you with any test results ordered. If you do not hear from us, please contact.    Follow up in: 3 months or sooner if needed with labs prior.     Scribe Attestation  By signing my  name below, I, Tay Martins   attest that this documentation has been prepared under the direction and in the presence of John Gonzalez MD.

## 2023-07-13 ENCOUNTER — OFFICE VISIT (OUTPATIENT)
Dept: PRIMARY CARE | Facility: CLINIC | Age: 78
End: 2023-07-13
Payer: MEDICARE

## 2023-07-13 VITALS
HEART RATE: 80 BPM | TEMPERATURE: 98.5 F | BODY MASS INDEX: 26.56 KG/M2 | DIASTOLIC BLOOD PRESSURE: 78 MMHG | OXYGEN SATURATION: 88 % | SYSTOLIC BLOOD PRESSURE: 122 MMHG | WEIGHT: 169.6 LBS

## 2023-07-13 DIAGNOSIS — R79.89 ELEVATED LFTS: ICD-10-CM

## 2023-07-13 DIAGNOSIS — I10 PRIMARY HYPERTENSION: Primary | ICD-10-CM

## 2023-07-13 DIAGNOSIS — E78.2 MIXED HYPERLIPIDEMIA: ICD-10-CM

## 2023-07-13 DIAGNOSIS — J44.9 CHRONIC OBSTRUCTIVE PULMONARY DISEASE, UNSPECIFIED COPD TYPE (MULTI): ICD-10-CM

## 2023-07-13 PROCEDURE — 1036F TOBACCO NON-USER: CPT | Performed by: FAMILY MEDICINE

## 2023-07-13 PROCEDURE — 99214 OFFICE O/P EST MOD 30 MIN: CPT | Performed by: FAMILY MEDICINE

## 2023-07-13 PROCEDURE — 3078F DIAST BP <80 MM HG: CPT | Performed by: FAMILY MEDICINE

## 2023-07-13 PROCEDURE — 3074F SYST BP LT 130 MM HG: CPT | Performed by: FAMILY MEDICINE

## 2023-07-13 PROCEDURE — 1159F MED LIST DOCD IN RCRD: CPT | Performed by: FAMILY MEDICINE

## 2023-07-13 RX ORDER — PREDNISONE 10 MG/1
TABLET ORAL
COMMUNITY
Start: 2023-06-11

## 2023-07-13 RX ORDER — AMOXICILLIN 500 MG/1
500 TABLET, FILM COATED ORAL EVERY 12 HOURS SCHEDULED
COMMUNITY
Start: 2023-06-11

## 2023-07-13 ASSESSMENT — ENCOUNTER SYMPTOMS
DEPRESSION: 0
OCCASIONAL FEELINGS OF UNSTEADINESS: 1
LOSS OF SENSATION IN FEET: 0

## 2023-07-13 NOTE — ASSESSMENT & PLAN NOTE
Is stable, continue with current treatment.   Is on 6L of oxygen continuously; increased from 4L last visit.

## 2023-08-22 DIAGNOSIS — R79.89 ELEVATED LFTS: ICD-10-CM

## 2023-08-22 RX ORDER — FOLIC ACID 1 MG/1
1 TABLET ORAL DAILY
Qty: 90 TABLET | Refills: 3 | Status: SHIPPED | OUTPATIENT
Start: 2023-08-22

## 2023-08-22 NOTE — TELEPHONE ENCOUNTER
Received a fax from [facility] requesting refill for the patient's prescription of Floic acid. Medication has been pended to the provider for approval.     LV: 10/12/2023  NV: 7/13/23

## 2023-08-30 DIAGNOSIS — I10 PRIMARY HYPERTENSION: ICD-10-CM

## 2023-08-30 RX ORDER — ATENOLOL 50 MG/1
50 TABLET ORAL DAILY
Qty: 90 TABLET | Refills: 1 | Status: SHIPPED | OUTPATIENT
Start: 2023-08-30

## 2023-08-30 NOTE — TELEPHONE ENCOUNTER
Rx Refill Request     Name: Rodrigo Conte  :  1945   Medication Name:  atenolol  Specific Pharmacy location:  [facility]   Date of last appointment:  2023  Date of next appointment:  10/12/2023  Best number to reach patient:  [phone]     Rx pending

## 2023-10-04 DIAGNOSIS — E78.2 MIXED HYPERLIPIDEMIA: Primary | ICD-10-CM

## 2023-10-04 RX ORDER — PRAVASTATIN SODIUM 40 MG/1
40 TABLET ORAL DAILY
Qty: 90 TABLET | Refills: 1 | Status: SHIPPED | OUTPATIENT
Start: 2023-10-04

## 2023-10-04 NOTE — TELEPHONE ENCOUNTER
Rx Refill Request Telephone Encounter    Name:  Rodrigo Conte  :  [id number]  Medication Name:  pravastatin (Pravachol) 40 mg   Specific Pharmacy location:  [facility]  Date of last appointment:    Date of next appointment:  10/12  Best number to reach patient:  [phone]

## 2023-10-05 ENCOUNTER — TELEPHONE (OUTPATIENT)
Dept: PRIMARY CARE | Facility: CLINIC | Age: 78
End: 2023-10-05
Payer: MEDICARE

## 2023-10-12 ENCOUNTER — APPOINTMENT (OUTPATIENT)
Dept: PRIMARY CARE | Facility: CLINIC | Age: 78
End: 2023-10-12
Payer: MEDICARE

## 2023-10-12 ENCOUNTER — TELEPHONE (OUTPATIENT)
Dept: PRIMARY CARE | Facility: CLINIC | Age: 78
End: 2023-10-12

## 2023-10-12 NOTE — TELEPHONE ENCOUNTER
LMOM TO RESCHEDULE 3 MONTH APPOINTMENT PER APPOINTMENT WORKQUE. PATIENT WAS SCHEDULED FOR 10/12 BUT CANCELLED.

## 2025-04-01 ENCOUNTER — APPOINTMENT (OUTPATIENT)
Dept: CARDIOLOGY | Facility: HOSPITAL | Age: 80
DRG: 300 | End: 2025-04-01
Payer: MEDICARE

## 2025-04-01 ENCOUNTER — APPOINTMENT (OUTPATIENT)
Dept: RADIOLOGY | Facility: HOSPITAL | Age: 80
DRG: 300 | End: 2025-04-01
Payer: MEDICARE

## 2025-04-01 ENCOUNTER — HOSPITAL ENCOUNTER (INPATIENT)
Facility: HOSPITAL | Age: 80
LOS: 5 days | Discharge: NURSING, REHAB OR LONG-TERM CARE | End: 2025-04-06
Attending: EMERGENCY MEDICINE | Admitting: STUDENT IN AN ORGANIZED HEALTH CARE EDUCATION/TRAINING PROGRAM
Payer: MEDICARE

## 2025-04-01 DIAGNOSIS — R93.89 ABNORMAL CXR: ICD-10-CM

## 2025-04-01 DIAGNOSIS — R53.1 GENERALIZED WEAKNESS: Primary | ICD-10-CM

## 2025-04-01 DIAGNOSIS — L03.114 CELLULITIS OF LEFT UPPER EXTREMITY: ICD-10-CM

## 2025-04-01 DIAGNOSIS — S51.812A SKIN TEAR OF FOREARM WITHOUT COMPLICATION, LEFT, INITIAL ENCOUNTER: ICD-10-CM

## 2025-04-01 DIAGNOSIS — I82.619 BASILIC VEIN THROMBOSIS: ICD-10-CM

## 2025-04-01 DIAGNOSIS — J96.12 CHRONIC RESPIRATORY FAILURE WITH HYPOXIA AND HYPERCAPNIA: ICD-10-CM

## 2025-04-01 DIAGNOSIS — J96.11 CHRONIC RESPIRATORY FAILURE WITH HYPOXIA AND HYPERCAPNIA: ICD-10-CM

## 2025-04-01 LAB
ALBUMIN SERPL BCP-MCNC: 3.8 G/DL (ref 3.4–5)
ALP SERPL-CCNC: 37 U/L (ref 33–136)
ALT SERPL W P-5'-P-CCNC: 30 U/L (ref 10–52)
ANION GAP BLDA CALCULATED.4IONS-SCNC: 7 MMO/L (ref 10–25)
ANION GAP SERPL CALC-SCNC: 12 MMOL/L (ref 10–20)
APPARATUS: ABNORMAL
APPEARANCE UR: CLEAR
ARTERIAL PATENCY WRIST A: POSITIVE
ARTERIAL PATENCY WRIST A: POSITIVE
AST SERPL W P-5'-P-CCNC: 31 U/L (ref 9–39)
BASE EXCESS BLDA CALC-SCNC: 9.6 MMOL/L (ref -2–3)
BASOPHILS # BLD AUTO: 0.02 X10*3/UL (ref 0–0.1)
BASOPHILS NFR BLD AUTO: 0.2 %
BILIRUB SERPL-MCNC: 0.5 MG/DL (ref 0–1.2)
BILIRUB UR STRIP.AUTO-MCNC: NEGATIVE MG/DL
BNP SERPL-MCNC: 115 PG/ML (ref 0–99)
BODY TEMPERATURE: ABNORMAL
BUN SERPL-MCNC: 25 MG/DL (ref 6–23)
CA-I BLDA-SCNC: 1.14 MMOL/L (ref 1.1–1.33)
CALCIUM SERPL-MCNC: 9 MG/DL (ref 8.6–10.3)
CARDIAC TROPONIN I PNL SERPL HS: 8 NG/L (ref 0–20)
CHLORIDE BLDA-SCNC: 93 MMOL/L (ref 98–107)
CHLORIDE SERPL-SCNC: 89 MMOL/L (ref 98–107)
CO2 SERPL-SCNC: 38 MMOL/L (ref 21–32)
COLOR UR: ABNORMAL
CREAT SERPL-MCNC: 0.94 MG/DL (ref 0.5–1.3)
EGFRCR SERPLBLD CKD-EPI 2021: 82 ML/MIN/1.73M*2
EOSINOPHIL # BLD AUTO: 0.19 X10*3/UL (ref 0–0.4)
EOSINOPHIL NFR BLD AUTO: 2.3 %
ERYTHROCYTE [DISTWIDTH] IN BLOOD BY AUTOMATED COUNT: 12 % (ref 11.5–14.5)
ETHANOL SERPL-MCNC: <10 MG/DL
GLUCOSE BLDA-MCNC: 84 MG/DL (ref 74–99)
GLUCOSE SERPL-MCNC: 83 MG/DL (ref 74–99)
GLUCOSE UR STRIP.AUTO-MCNC: NORMAL MG/DL
GRAN CASTS #/AREA UR COMP ASSIST: ABNORMAL /LPF
HCO3 BLDA-SCNC: 37.3 MMOL/L (ref 22–26)
HCT VFR BLD AUTO: 36.7 % (ref 41–52)
HCT VFR BLD EST: 35 % (ref 41–52)
HGB BLD-MCNC: 11.7 G/DL (ref 13.5–17.5)
HGB BLDA-MCNC: 11.7 G/DL (ref 13.5–17.5)
HOLD SPECIMEN: NORMAL
HYALINE CASTS #/AREA URNS AUTO: ABNORMAL /LPF
IMM GRANULOCYTES # BLD AUTO: 0.03 X10*3/UL (ref 0–0.5)
IMM GRANULOCYTES NFR BLD AUTO: 0.4 % (ref 0–0.9)
INHALED O2 CONCENTRATION: 6 %
INR PPP: 1.2 (ref 0.9–1.1)
KETONES UR STRIP.AUTO-MCNC: ABNORMAL MG/DL
LACTATE BLDA-SCNC: 0.8 MMOL/L (ref 0.4–2)
LACTATE SERPL-SCNC: 1.1 MMOL/L (ref 0.4–2)
LEUKOCYTE ESTERASE UR QL STRIP.AUTO: NEGATIVE
LYMPHOCYTES # BLD AUTO: 0.44 X10*3/UL (ref 0.8–3)
LYMPHOCYTES NFR BLD AUTO: 5.3 %
MAGNESIUM SERPL-MCNC: 1.87 MG/DL (ref 1.6–2.4)
MCH RBC QN AUTO: 32 PG (ref 26–34)
MCHC RBC AUTO-ENTMCNC: 31.9 G/DL (ref 32–36)
MCV RBC AUTO: 100 FL (ref 80–100)
MONOCYTES # BLD AUTO: 0.97 X10*3/UL (ref 0.05–0.8)
MONOCYTES NFR BLD AUTO: 11.7 %
MUCOUS THREADS #/AREA URNS AUTO: ABNORMAL /LPF
NEUTROPHILS # BLD AUTO: 6.63 X10*3/UL (ref 1.6–5.5)
NEUTROPHILS NFR BLD AUTO: 80.1 %
NITRITE UR QL STRIP.AUTO: NEGATIVE
NRBC BLD-RTO: 0 /100 WBCS (ref 0–0)
OXYHGB MFR BLDA: 94.1 % (ref 94–98)
PCO2 BLDA: 66 MM HG (ref 38–42)
PH BLDA: 7.36 PH (ref 7.38–7.42)
PH UR STRIP.AUTO: 5.5 [PH]
PLATELET # BLD AUTO: 191 X10*3/UL (ref 150–450)
PO2 BLDA: 75 MM HG (ref 85–95)
POTASSIUM BLDA-SCNC: 4.1 MMOL/L (ref 3.5–5.3)
POTASSIUM SERPL-SCNC: 4.1 MMOL/L (ref 3.5–5.3)
PROT SERPL-MCNC: 7.6 G/DL (ref 6.4–8.2)
PROT UR STRIP.AUTO-MCNC: ABNORMAL MG/DL
PROTHROMBIN TIME: 13.2 SECONDS (ref 9.8–12.4)
RBC # BLD AUTO: 3.66 X10*6/UL (ref 4.5–5.9)
RBC # UR STRIP.AUTO: NEGATIVE MG/DL
RBC #/AREA URNS AUTO: ABNORMAL /HPF
SAO2 % BLDA: 97 % (ref 94–100)
SITE OF ARTERIAL PUNCTURE: ABNORMAL
SODIUM BLDA-SCNC: 133 MMOL/L (ref 136–145)
SODIUM SERPL-SCNC: 135 MMOL/L (ref 136–145)
SP GR UR STRIP.AUTO: 1.02
SPECIMEN DRAWN FROM PATIENT: ABNORMAL
URATE SERPL-MCNC: 10.4 MG/DL (ref 4–7.5)
UROBILINOGEN UR STRIP.AUTO-MCNC: NORMAL MG/DL
WBC # BLD AUTO: 8.3 X10*3/UL (ref 4.4–11.3)
WBC #/AREA URNS AUTO: ABNORMAL /HPF

## 2025-04-01 PROCEDURE — 85610 PROTHROMBIN TIME: CPT | Performed by: EMERGENCY MEDICINE

## 2025-04-01 PROCEDURE — 2500000004 HC RX 250 GENERAL PHARMACY W/ HCPCS (ALT 636 FOR OP/ED): Performed by: INTERNAL MEDICINE

## 2025-04-01 PROCEDURE — 36415 COLL VENOUS BLD VENIPUNCTURE: CPT | Performed by: EMERGENCY MEDICINE

## 2025-04-01 PROCEDURE — 70450 CT HEAD/BRAIN W/O DYE: CPT

## 2025-04-01 PROCEDURE — 81001 URINALYSIS AUTO W/SCOPE: CPT | Performed by: EMERGENCY MEDICINE

## 2025-04-01 PROCEDURE — 1100000001 HC PRIVATE ROOM DAILY

## 2025-04-01 PROCEDURE — 73130 X-RAY EXAM OF HAND: CPT | Mod: LEFT SIDE | Performed by: RADIOLOGY

## 2025-04-01 PROCEDURE — 80053 COMPREHEN METABOLIC PANEL: CPT | Performed by: EMERGENCY MEDICINE

## 2025-04-01 PROCEDURE — 82435 ASSAY OF BLOOD CHLORIDE: CPT | Performed by: EMERGENCY MEDICINE

## 2025-04-01 PROCEDURE — 2500000004 HC RX 250 GENERAL PHARMACY W/ HCPCS (ALT 636 FOR OP/ED): Performed by: EMERGENCY MEDICINE

## 2025-04-01 PROCEDURE — 70450 CT HEAD/BRAIN W/O DYE: CPT | Performed by: RADIOLOGY

## 2025-04-01 PROCEDURE — 2500000005 HC RX 250 GENERAL PHARMACY W/O HCPCS: Performed by: EMERGENCY MEDICINE

## 2025-04-01 PROCEDURE — 84550 ASSAY OF BLOOD/URIC ACID: CPT | Performed by: STUDENT IN AN ORGANIZED HEALTH CARE EDUCATION/TRAINING PROGRAM

## 2025-04-01 PROCEDURE — 96365 THER/PROPH/DIAG IV INF INIT: CPT

## 2025-04-01 PROCEDURE — 80202 ASSAY OF VANCOMYCIN: CPT | Performed by: STUDENT IN AN ORGANIZED HEALTH CARE EDUCATION/TRAINING PROGRAM

## 2025-04-01 PROCEDURE — 83735 ASSAY OF MAGNESIUM: CPT | Performed by: EMERGENCY MEDICINE

## 2025-04-01 PROCEDURE — 87040 BLOOD CULTURE FOR BACTERIA: CPT | Mod: ELYLAB | Performed by: STUDENT IN AN ORGANIZED HEALTH CARE EDUCATION/TRAINING PROGRAM

## 2025-04-01 PROCEDURE — 93971 EXTREMITY STUDY: CPT

## 2025-04-01 PROCEDURE — 83880 ASSAY OF NATRIURETIC PEPTIDE: CPT | Performed by: EMERGENCY MEDICINE

## 2025-04-01 PROCEDURE — 71045 X-RAY EXAM CHEST 1 VIEW: CPT

## 2025-04-01 PROCEDURE — 73110 X-RAY EXAM OF WRIST: CPT | Mod: LT

## 2025-04-01 PROCEDURE — 84484 ASSAY OF TROPONIN QUANT: CPT | Performed by: EMERGENCY MEDICINE

## 2025-04-01 PROCEDURE — 93005 ELECTROCARDIOGRAM TRACING: CPT

## 2025-04-01 PROCEDURE — 85025 COMPLETE CBC W/AUTO DIFF WBC: CPT | Performed by: EMERGENCY MEDICINE

## 2025-04-01 PROCEDURE — 99223 1ST HOSP IP/OBS HIGH 75: CPT | Performed by: STUDENT IN AN ORGANIZED HEALTH CARE EDUCATION/TRAINING PROGRAM

## 2025-04-01 PROCEDURE — 36415 COLL VENOUS BLD VENIPUNCTURE: CPT | Performed by: STUDENT IN AN ORGANIZED HEALTH CARE EDUCATION/TRAINING PROGRAM

## 2025-04-01 PROCEDURE — 2500000004 HC RX 250 GENERAL PHARMACY W/ HCPCS (ALT 636 FOR OP/ED): Performed by: STUDENT IN AN ORGANIZED HEALTH CARE EDUCATION/TRAINING PROGRAM

## 2025-04-01 PROCEDURE — 2500000002 HC RX 250 W HCPCS SELF ADMINISTERED DRUGS (ALT 637 FOR MEDICARE OP, ALT 636 FOR OP/ED): Performed by: EMERGENCY MEDICINE

## 2025-04-01 PROCEDURE — 2500000002 HC RX 250 W HCPCS SELF ADMINISTERED DRUGS (ALT 637 FOR MEDICARE OP, ALT 636 FOR OP/ED): Performed by: STUDENT IN AN ORGANIZED HEALTH CARE EDUCATION/TRAINING PROGRAM

## 2025-04-01 PROCEDURE — 73130 X-RAY EXAM OF HAND: CPT | Mod: LT

## 2025-04-01 PROCEDURE — 36600 WITHDRAWAL OF ARTERIAL BLOOD: CPT

## 2025-04-01 PROCEDURE — 82077 ASSAY SPEC XCP UR&BREATH IA: CPT | Performed by: EMERGENCY MEDICINE

## 2025-04-01 PROCEDURE — 99285 EMERGENCY DEPT VISIT HI MDM: CPT | Mod: 25 | Performed by: EMERGENCY MEDICINE

## 2025-04-01 PROCEDURE — 2500000001 HC RX 250 WO HCPCS SELF ADMINISTERED DRUGS (ALT 637 FOR MEDICARE OP): Performed by: STUDENT IN AN ORGANIZED HEALTH CARE EDUCATION/TRAINING PROGRAM

## 2025-04-01 PROCEDURE — 94640 AIRWAY INHALATION TREATMENT: CPT

## 2025-04-01 PROCEDURE — 73110 X-RAY EXAM OF WRIST: CPT | Mod: LEFT SIDE | Performed by: RADIOLOGY

## 2025-04-01 PROCEDURE — 83605 ASSAY OF LACTIC ACID: CPT | Performed by: EMERGENCY MEDICINE

## 2025-04-01 PROCEDURE — 87075 CULTR BACTERIA EXCEPT BLOOD: CPT | Mod: ELYLAB | Performed by: STUDENT IN AN ORGANIZED HEALTH CARE EDUCATION/TRAINING PROGRAM

## 2025-04-01 PROCEDURE — 99222 1ST HOSP IP/OBS MODERATE 55: CPT | Performed by: INTERNAL MEDICINE

## 2025-04-01 PROCEDURE — 84132 ASSAY OF SERUM POTASSIUM: CPT | Performed by: EMERGENCY MEDICINE

## 2025-04-01 PROCEDURE — 71045 X-RAY EXAM CHEST 1 VIEW: CPT | Performed by: RADIOLOGY

## 2025-04-01 RX ORDER — LORAZEPAM 2 MG/ML
2 INJECTION INTRAMUSCULAR EVERY 2 HOUR PRN
Status: DISCONTINUED | OUTPATIENT
Start: 2025-04-01 | End: 2025-04-06

## 2025-04-01 RX ORDER — IPRATROPIUM BROMIDE AND ALBUTEROL SULFATE 2.5; .5 MG/3ML; MG/3ML
3 SOLUTION RESPIRATORY (INHALATION) ONCE
Status: COMPLETED | OUTPATIENT
Start: 2025-04-01 | End: 2025-04-01

## 2025-04-01 RX ORDER — CEFAZOLIN SODIUM 1 G/50ML
1 SOLUTION INTRAVENOUS EVERY 8 HOURS
Status: DISCONTINUED | OUTPATIENT
Start: 2025-04-01 | End: 2025-04-02

## 2025-04-01 RX ORDER — PRIMIDONE 50 MG/1
100 TABLET ORAL 2 TIMES DAILY
Status: DISCONTINUED | OUTPATIENT
Start: 2025-04-01 | End: 2025-04-06 | Stop reason: HOSPADM

## 2025-04-01 RX ORDER — IPRATROPIUM BROMIDE AND ALBUTEROL SULFATE 2.5; .5 MG/3ML; MG/3ML
3 SOLUTION RESPIRATORY (INHALATION) EVERY 6 HOURS PRN
Status: DISCONTINUED | OUTPATIENT
Start: 2025-04-01 | End: 2025-04-06 | Stop reason: HOSPADM

## 2025-04-01 RX ORDER — ALBUTEROL SULFATE 0.83 MG/ML
2.5 SOLUTION RESPIRATORY (INHALATION) ONCE
Status: COMPLETED | OUTPATIENT
Start: 2025-04-01 | End: 2025-04-01

## 2025-04-01 RX ORDER — COLCHICINE 0.6 MG/1
1.2 TABLET ORAL ONCE
Status: COMPLETED | OUTPATIENT
Start: 2025-04-01 | End: 2025-04-01

## 2025-04-01 RX ORDER — COLCHICINE 0.6 MG/1
0.6 TABLET ORAL 2 TIMES DAILY
Status: DISCONTINUED | OUTPATIENT
Start: 2025-04-02 | End: 2025-04-06 | Stop reason: HOSPADM

## 2025-04-01 RX ORDER — SENNOSIDES 8.6 MG/1
2 TABLET ORAL NIGHTLY
Status: DISCONTINUED | OUTPATIENT
Start: 2025-04-01 | End: 2025-04-06 | Stop reason: HOSPADM

## 2025-04-01 RX ORDER — ENOXAPARIN SODIUM 100 MG/ML
40 INJECTION SUBCUTANEOUS EVERY 24 HOURS
Status: DISCONTINUED | OUTPATIENT
Start: 2025-04-01 | End: 2025-04-06 | Stop reason: HOSPADM

## 2025-04-01 RX ORDER — LORAZEPAM 2 MG/ML
1 INJECTION INTRAMUSCULAR EVERY 2 HOUR PRN
Status: DISCONTINUED | OUTPATIENT
Start: 2025-04-01 | End: 2025-04-06

## 2025-04-01 RX ORDER — IBUPROFEN 600 MG/1
600 TABLET ORAL 2 TIMES DAILY
Status: DISCONTINUED | OUTPATIENT
Start: 2025-04-01 | End: 2025-04-06 | Stop reason: HOSPADM

## 2025-04-01 RX ORDER — HYDROMORPHONE HYDROCHLORIDE 1 MG/ML
0.6 INJECTION, SOLUTION INTRAMUSCULAR; INTRAVENOUS; SUBCUTANEOUS EVERY 4 HOURS PRN
Status: DISCONTINUED | OUTPATIENT
Start: 2025-04-01 | End: 2025-04-06 | Stop reason: HOSPADM

## 2025-04-01 RX ORDER — LANOLIN ALCOHOL/MO/W.PET/CERES
100 CREAM (GRAM) TOPICAL DAILY
Status: DISCONTINUED | OUTPATIENT
Start: 2025-04-04 | End: 2025-04-06 | Stop reason: HOSPADM

## 2025-04-01 RX ORDER — FOLIC ACID 1 MG/1
1 TABLET ORAL DAILY
Status: DISCONTINUED | OUTPATIENT
Start: 2025-04-02 | End: 2025-04-06 | Stop reason: HOSPADM

## 2025-04-01 RX ORDER — MULTIVIT-MIN/IRON FUM/FOLIC AC 7.5 MG-4
1 TABLET ORAL DAILY
Status: DISCONTINUED | OUTPATIENT
Start: 2025-04-02 | End: 2025-04-06 | Stop reason: HOSPADM

## 2025-04-01 RX ORDER — VANCOMYCIN HYDROCHLORIDE 1 G/20ML
INJECTION, POWDER, LYOPHILIZED, FOR SOLUTION INTRAVENOUS DAILY PRN
Status: DISCONTINUED | OUTPATIENT
Start: 2025-04-01 | End: 2025-04-06 | Stop reason: HOSPADM

## 2025-04-01 RX ORDER — LORAZEPAM 2 MG/ML
0.5 INJECTION INTRAMUSCULAR EVERY 2 HOUR PRN
Status: DISCONTINUED | OUTPATIENT
Start: 2025-04-01 | End: 2025-04-06

## 2025-04-01 RX ORDER — IPRATROPIUM BROMIDE AND ALBUTEROL SULFATE 2.5; .5 MG/3ML; MG/3ML
3 SOLUTION RESPIRATORY (INHALATION)
Status: DISCONTINUED | OUTPATIENT
Start: 2025-04-01 | End: 2025-04-06 | Stop reason: HOSPADM

## 2025-04-01 RX ORDER — POLYETHYLENE GLYCOL 3350 17 G/17G
17 POWDER, FOR SOLUTION ORAL DAILY
Status: DISCONTINUED | OUTPATIENT
Start: 2025-04-02 | End: 2025-04-06 | Stop reason: HOSPADM

## 2025-04-01 RX ADMIN — COLCHICINE 1.2 MG: 0.6 TABLET, FILM COATED ORAL at 18:34

## 2025-04-01 RX ADMIN — PIPERACILLIN AND TAZOBACTAM 4.5 G: 4; .5 INJECTION, POWDER, FOR SOLUTION INTRAVENOUS at 17:16

## 2025-04-01 RX ADMIN — IPRATROPIUM BROMIDE AND ALBUTEROL SULFATE 3 ML: .5; 3 SOLUTION RESPIRATORY (INHALATION) at 17:38

## 2025-04-01 RX ADMIN — VANCOMYCIN HYDROCHLORIDE 2 G: 5 INJECTION, POWDER, LYOPHILIZED, FOR SOLUTION INTRAVENOUS at 18:26

## 2025-04-01 RX ADMIN — IPRATROPIUM BROMIDE AND ALBUTEROL SULFATE 3 ML: 2.5; .5 SOLUTION RESPIRATORY (INHALATION) at 22:09

## 2025-04-01 RX ADMIN — Medication 6 L/MIN: at 17:38

## 2025-04-01 RX ADMIN — Medication 6 L/MIN: at 14:50

## 2025-04-01 RX ADMIN — CEFAZOLIN SODIUM 1 G: 1 INJECTION, SOLUTION INTRAVENOUS at 23:16

## 2025-04-01 RX ADMIN — ENOXAPARIN SODIUM 40 MG: 40 INJECTION SUBCUTANEOUS at 23:15

## 2025-04-01 RX ADMIN — ALBUTEROL SULFATE 2.5 MG: 2.5 SOLUTION RESPIRATORY (INHALATION) at 17:38

## 2025-04-01 RX ADMIN — PRIMIDONE 50 MG: 50 TABLET ORAL at 23:15

## 2025-04-01 RX ADMIN — IBUPROFEN 600 MG: 600 TABLET, FILM COATED ORAL at 23:16

## 2025-04-01 SDOH — SOCIAL STABILITY: SOCIAL INSECURITY
WITHIN THE LAST YEAR, HAVE YOU BEEN KICKED, HIT, SLAPPED, OR OTHERWISE PHYSICALLY HURT BY YOUR PARTNER OR EX-PARTNER?: NO

## 2025-04-01 SDOH — SOCIAL STABILITY: SOCIAL INSECURITY: WITHIN THE LAST YEAR, HAVE YOU BEEN AFRAID OF YOUR PARTNER OR EX-PARTNER?: NO

## 2025-04-01 SDOH — ECONOMIC STABILITY: FOOD INSECURITY: WITHIN THE PAST 12 MONTHS, YOU WORRIED THAT YOUR FOOD WOULD RUN OUT BEFORE YOU GOT THE MONEY TO BUY MORE.: NEVER TRUE

## 2025-04-01 SDOH — ECONOMIC STABILITY: FOOD INSECURITY: WITHIN THE PAST 12 MONTHS, THE FOOD YOU BOUGHT JUST DIDN'T LAST AND YOU DIDN'T HAVE MONEY TO GET MORE.: NEVER TRUE

## 2025-04-01 SDOH — HEALTH STABILITY: MENTAL HEALTH: HOW OFTEN DO YOU HAVE A DRINK CONTAINING ALCOHOL?: 4 OR MORE TIMES A WEEK

## 2025-04-01 SDOH — HEALTH STABILITY: MENTAL HEALTH: HOW OFTEN DO YOU HAVE SIX OR MORE DRINKS ON ONE OCCASION?: NEVER

## 2025-04-01 SDOH — ECONOMIC STABILITY: INCOME INSECURITY: IN THE PAST 12 MONTHS HAS THE ELECTRIC, GAS, OIL, OR WATER COMPANY THREATENED TO SHUT OFF SERVICES IN YOUR HOME?: NO

## 2025-04-01 SDOH — HEALTH STABILITY: MENTAL HEALTH: HOW MANY DRINKS CONTAINING ALCOHOL DO YOU HAVE ON A TYPICAL DAY WHEN YOU ARE DRINKING?: 3 OR 4

## 2025-04-01 SDOH — SOCIAL STABILITY: SOCIAL INSECURITY
WITHIN THE LAST YEAR, HAVE YOU BEEN RAPED OR FORCED TO HAVE ANY KIND OF SEXUAL ACTIVITY BY YOUR PARTNER OR EX-PARTNER?: NO

## 2025-04-01 SDOH — SOCIAL STABILITY: SOCIAL INSECURITY: DOES ANYONE TRY TO KEEP YOU FROM HAVING/CONTACTING OTHER FRIENDS OR DOING THINGS OUTSIDE YOUR HOME?: NO

## 2025-04-01 SDOH — SOCIAL STABILITY: SOCIAL INSECURITY: ARE YOU OR HAVE YOU BEEN THREATENED OR ABUSED PHYSICALLY, EMOTIONALLY, OR SEXUALLY BY ANYONE?: NO

## 2025-04-01 SDOH — ECONOMIC STABILITY: HOUSING INSECURITY: IN THE LAST 12 MONTHS, WAS THERE A TIME WHEN YOU WERE NOT ABLE TO PAY THE MORTGAGE OR RENT ON TIME?: NO

## 2025-04-01 SDOH — ECONOMIC STABILITY: HOUSING INSECURITY: AT ANY TIME IN THE PAST 12 MONTHS, WERE YOU HOMELESS OR LIVING IN A SHELTER (INCLUDING NOW)?: NO

## 2025-04-01 SDOH — SOCIAL STABILITY: SOCIAL INSECURITY: DO YOU FEEL ANYONE HAS EXPLOITED OR TAKEN ADVANTAGE OF YOU FINANCIALLY OR OF YOUR PERSONAL PROPERTY?: NO

## 2025-04-01 SDOH — ECONOMIC STABILITY: FOOD INSECURITY: HOW HARD IS IT FOR YOU TO PAY FOR THE VERY BASICS LIKE FOOD, HOUSING, MEDICAL CARE, AND HEATING?: NOT HARD AT ALL

## 2025-04-01 SDOH — SOCIAL STABILITY: SOCIAL INSECURITY: WERE YOU ABLE TO COMPLETE ALL THE BEHAVIORAL HEALTH SCREENINGS?: YES

## 2025-04-01 SDOH — SOCIAL STABILITY: SOCIAL INSECURITY: ARE THERE ANY APPARENT SIGNS OF INJURIES/BEHAVIORS THAT COULD BE RELATED TO ABUSE/NEGLECT?: NO

## 2025-04-01 SDOH — SOCIAL STABILITY: SOCIAL INSECURITY: WITHIN THE LAST YEAR, HAVE YOU BEEN HUMILIATED OR EMOTIONALLY ABUSED IN OTHER WAYS BY YOUR PARTNER OR EX-PARTNER?: NO

## 2025-04-01 SDOH — ECONOMIC STABILITY: HOUSING INSECURITY: IN THE PAST 12 MONTHS, HOW MANY TIMES HAVE YOU MOVED WHERE YOU WERE LIVING?: 0

## 2025-04-01 SDOH — SOCIAL STABILITY: SOCIAL INSECURITY: HAVE YOU HAD ANY THOUGHTS OF HARMING ANYONE ELSE?: NO

## 2025-04-01 SDOH — SOCIAL STABILITY: SOCIAL INSECURITY: HAVE YOU HAD THOUGHTS OF HARMING ANYONE ELSE?: NO

## 2025-04-01 SDOH — ECONOMIC STABILITY: TRANSPORTATION INSECURITY: IN THE PAST 12 MONTHS, HAS LACK OF TRANSPORTATION KEPT YOU FROM MEDICAL APPOINTMENTS OR FROM GETTING MEDICATIONS?: NO

## 2025-04-01 SDOH — SOCIAL STABILITY: SOCIAL INSECURITY: ABUSE: ADULT

## 2025-04-01 SDOH — SOCIAL STABILITY: SOCIAL INSECURITY: DO YOU FEEL UNSAFE GOING BACK TO THE PLACE WHERE YOU ARE LIVING?: NO

## 2025-04-01 SDOH — SOCIAL STABILITY: SOCIAL INSECURITY: HAS ANYONE EVER THREATENED TO HURT YOUR FAMILY OR YOUR PETS?: NO

## 2025-04-01 ASSESSMENT — LIFESTYLE VARIABLES
SKIP TO QUESTIONS 9-10: 0
AUDITORY DISTURBANCES: NOT PRESENT
SKIP TO QUESTIONS 9-10: 0
TOTAL SCORE: 0
HAVE PEOPLE ANNOYED YOU BY CRITICIZING YOUR DRINKING: NO
EVER FELT BAD OR GUILTY ABOUT YOUR DRINKING: NO
TREMOR: NO TREMOR
ORIENTATION AND CLOUDING OF SENSORIUM: ORIENTED AND CAN DO SERIAL ADDITIONS
AUDIT-C TOTAL SCORE: 5
VISUAL DISTURBANCES: NOT PRESENT
ANXIETY: NO ANXIETY, AT EASE
HOW OFTEN DO YOU HAVE A DRINK CONTAINING ALCOHOL: 4 OR MORE TIMES A WEEK
NAUSEA AND VOMITING: NO NAUSEA AND NO VOMITING
HEADACHE, FULLNESS IN HEAD: NOT PRESENT
HOW MANY STANDARD DRINKS CONTAINING ALCOHOL DO YOU HAVE ON A TYPICAL DAY: 3 OR 4
NAUSEA AND VOMITING: NO NAUSEA AND NO VOMITING
HOW OFTEN DO YOU HAVE 6 OR MORE DRINKS ON ONE OCCASION: NEVER
PAROXYSMAL SWEATS: NO SWEAT VISIBLE
AUDITORY DISTURBANCES: NOT PRESENT
AUDIT-C TOTAL SCORE: 5
TOTAL SCORE: 0
ANXIETY: NO ANXIETY, AT EASE
VISUAL DISTURBANCES: NOT PRESENT
EVER HAD A DRINK FIRST THING IN THE MORNING TO STEADY YOUR NERVES TO GET RID OF A HANGOVER: NO
AGITATION: NORMAL ACTIVITY
PAROXYSMAL SWEATS: NO SWEAT VISIBLE
ORIENTATION AND CLOUDING OF SENSORIUM: ORIENTED AND CAN DO SERIAL ADDITIONS
HEADACHE, FULLNESS IN HEAD: NOT PRESENT
HAVE YOU EVER FELT YOU SHOULD CUT DOWN ON YOUR DRINKING: NO
TREMOR: NO TREMOR
AUDIT-C TOTAL SCORE: 5
AGITATION: NORMAL ACTIVITY
TOTAL SCORE: 0

## 2025-04-01 ASSESSMENT — COGNITIVE AND FUNCTIONAL STATUS - GENERAL
EATING MEALS: A LITTLE
STANDING UP FROM CHAIR USING ARMS: A LOT
MOVING FROM LYING ON BACK TO SITTING ON SIDE OF FLAT BED WITH BEDRAILS: A LITTLE
CLIMB 3 TO 5 STEPS WITH RAILING: A LOT
TURNING FROM BACK TO SIDE WHILE IN FLAT BAD: A LOT
DRESSING REGULAR UPPER BODY CLOTHING: A LITTLE
WALKING IN HOSPITAL ROOM: A LOT
MOBILITY SCORE: 13
MOVING TO AND FROM BED TO CHAIR: A LOT
DRESSING REGULAR LOWER BODY CLOTHING: A LOT
PATIENT BASELINE BEDBOUND: NO
HELP NEEDED FOR BATHING: A LITTLE
TOILETING: A LITTLE
DAILY ACTIVITIY SCORE: 17
PERSONAL GROOMING: A LITTLE

## 2025-04-01 ASSESSMENT — ACTIVITIES OF DAILY LIVING (ADL)
ADEQUATE_TO_COMPLETE_ADL: YES
ASSISTIVE_DEVICE: CANE;EYEGLASSES
PATIENT'S MEMORY ADEQUATE TO SAFELY COMPLETE DAILY ACTIVITIES?: YES
JUDGMENT_ADEQUATE_SAFELY_COMPLETE_DAILY_ACTIVITIES: YES
DRESSING YOURSELF: INDEPENDENT
LACK_OF_TRANSPORTATION: NO
WALKS IN HOME: NEEDS ASSISTANCE
GROOMING: NEEDS ASSISTANCE
HEARING - LEFT EAR: FUNCTIONAL
HEARING - RIGHT EAR: FUNCTIONAL
TOILETING: NEEDS ASSISTANCE
FEEDING YOURSELF: INDEPENDENT
BATHING: NEEDS ASSISTANCE

## 2025-04-01 ASSESSMENT — PATIENT HEALTH QUESTIONNAIRE - PHQ9
2. FEELING DOWN, DEPRESSED OR HOPELESS: NOT AT ALL
1. LITTLE INTEREST OR PLEASURE IN DOING THINGS: NOT AT ALL
SUM OF ALL RESPONSES TO PHQ9 QUESTIONS 1 & 2: 0

## 2025-04-01 ASSESSMENT — PAIN DESCRIPTION - LOCATION: LOCATION: HAND

## 2025-04-01 ASSESSMENT — PAIN DESCRIPTION - PAIN TYPE: TYPE: ACUTE PAIN

## 2025-04-01 ASSESSMENT — COLUMBIA-SUICIDE SEVERITY RATING SCALE - C-SSRS
6. HAVE YOU EVER DONE ANYTHING, STARTED TO DO ANYTHING, OR PREPARED TO DO ANYTHING TO END YOUR LIFE?: NO
2. HAVE YOU ACTUALLY HAD ANY THOUGHTS OF KILLING YOURSELF?: NO
1. IN THE PAST MONTH, HAVE YOU WISHED YOU WERE DEAD OR WISHED YOU COULD GO TO SLEEP AND NOT WAKE UP?: NO

## 2025-04-01 ASSESSMENT — PAIN DESCRIPTION - ONSET: ONSET: ONGOING

## 2025-04-01 ASSESSMENT — PAIN SCALES - GENERAL
PAINLEVEL_OUTOF10: 5 - MODERATE PAIN
PAINLEVEL_OUTOF10: 5 - MODERATE PAIN

## 2025-04-01 ASSESSMENT — PAIN DESCRIPTION - DESCRIPTORS: DESCRIPTORS: SHARP;SHOOTING

## 2025-04-01 ASSESSMENT — PAIN DESCRIPTION - PROGRESSION: CLINICAL_PROGRESSION: NOT CHANGED

## 2025-04-01 ASSESSMENT — PAIN DESCRIPTION - FREQUENCY: FREQUENCY: CONSTANT/CONTINUOUS

## 2025-04-01 ASSESSMENT — PAIN DESCRIPTION - ORIENTATION: ORIENTATION: LEFT

## 2025-04-01 ASSESSMENT — PAIN - FUNCTIONAL ASSESSMENT: PAIN_FUNCTIONAL_ASSESSMENT: 0-10

## 2025-04-01 NOTE — ED PROVIDER NOTES
"79-year-old male presents emergency department with chief complaint of lower extremity edema and left hand and forearm edema and erythema.  He reports he said the swelling in his left upper extremity for about 2 weeks.  He states he has had the swelling in his legs for a month or so.  Denies any fevers, coughing, or congestion.  Patient wears 6 L nasal cannula oxygen at baseline for COPD.  He states he does not feel more short of breath than usual.  No chest pain.  Denies abdominal pain, nausea, or vomiting.  No dysuria, diarrhea, constipation, black or bloody stools.  Patient states that he woke up this morning and had some bleeding coming from his left wrist where the swelling was.  He denies any known injury or trauma to the area. Patient states he has seen his primary care doctor for this and they gave him \"some pills \"but he is not sure what they were.  He does admit to previous tobacco use.  No reported illicit drug use. Chart review shows history of alcohol abuse, alcohol withdrawal, COPD, hyperlipidemia, hypertension, osteoarthritis, seborrheic dermatitis, tremors, and elevated LFTs.    Patient's daughter arrived shortly after him and aided in providing history.  She states that the patient has had this swelling, redness, and warmth in his left hand for about a week or so.  She states she recently picked up furosemide from his primary care doctor for treatment of this.  She reports that the swelling in his legs is normal and related to the steroids that he is on chronically for his COPD.  She states that the patient usually drinks 2-3 beers a day and does not have history of alcohol withdrawal.  However, she states that he has not been drinking much this week.  She reports that this week in particular she has noticed he is much more weak than usual.  She reports at times she is even having to help him to stand.  In addition, she states that the patient's speech seems more slurred and difficult to understand " compared to usual.  She states he is more shaky.  She reports the patient has had increased difficulties caring for himself over the past week and that is why she feels he needs to be evaluated.  She confirms the patient is not on anticoagulants.  She states he has not had any recent falls to her knowledge.      History provided by:  Patient and relative   used: No           ------------------------------------------------------------------------------------------------------------------------------------------    VS: As documented in the triage note and EMR flowsheet from this visit were reviewed.    Review of Systems  Constitutional: no fever, chills  Eyes: no redness, discharge, pain  HENT: no sore throat, nose bleeds, congestion, rhinorrhea   Cardiovascular: no chest pain, palpitations warts worsening leg edema and left upper extremity edema  Respiratory: Reports shortness of breath and cough that are at baseline   GI: no nausea, diarrhea, pain, vomiting, constipation, BRBPR, melena  : no dysuria, frequency, hematuria  Musculoskeletal: no neck pain, stiffness, patient is appreciated to have swelling and erythema of the left upper extremity from the hand to the mid forearm.  Skin: no rash,wounds patient has edema and erythema of the left hand to the mid forearm.  There is an area on the dorsal aspect of the wrist that is bleeding.  Neurological: no headache, dizziness, lightheadedness, weakness, numbness, or tingling  Psychiatric: no suicidal thoughts, confusion, agitation  Metabolic: no polyuria or polydipsia  Hematologic: no increased bleeding or bruising  Immunology: No immunocompromise state    [facility]  Nursing notes reviewed and confirmed by me.  Chart review performed including medications, allergies, and medical, surgical, and family history  Visit Vitals  /57   Pulse 65   Temp 36.3 °C (97.3 °F) (Temporal)   Resp 18     Physical Exam  Vitals and nursing note reviewed.    Constitutional:       General: He is not in acute distress.     Appearance: He is ill-appearing.   HENT:      Head: Normocephalic and atraumatic.      Right Ear: External ear normal.      Left Ear: External ear normal.      Nose: Nose normal. No congestion or rhinorrhea.      Mouth/Throat:      Mouth: Mucous membranes are dry.      Pharynx: No oropharyngeal exudate or posterior oropharyngeal erythema.   Eyes:      Extraocular Movements: Extraocular movements intact.      Conjunctiva/sclera: Conjunctivae normal.      Pupils: Pupils are equal, round, and reactive to light.   Cardiovascular:      Rate and Rhythm: Normal rate and regular rhythm.      Pulses: Normal pulses.      Heart sounds: Normal heart sounds.   Pulmonary:      Effort: Pulmonary effort is normal. No respiratory distress.      Breath sounds: No stridor. Wheezing present. No rhonchi or rales.      Comments: Coarse breath sounds diminished in the bases.  Slight end expiratory wheeze.  Abdominal:      General: There is no distension.      Palpations: Abdomen is soft.      Tenderness: There is no abdominal tenderness. There is no guarding or rebound.   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      Cervical back: Normal range of motion and neck supple. No rigidity.      Right lower leg: Edema present.      Left lower leg: Edema present.      Comments: No calf tenderness   Patient is appreciated to have edema and swelling of the left hand extending to the mid forearm.  Patient is 1+ pitting edema bilateral lower extremities   Skin:     General: Skin is warm and dry.      Capillary Refill: Capillary refill takes less than 2 seconds.      Coloration: Skin is not jaundiced.      Findings: Erythema and wound present. No rash.      Comments: Left hand mid forearm is erythematous and warm to touch.  No significant tenderness to palpation.  Patient also appreciated to  have a skin tear on the dorsum of the wrist measuring about 1 to 2 cm in length.  There is slow oozing venous bleed from this area.  Pressure dressing applied.   Neurological:      General: No focal deficit present.      Mental Status: He is alert and oriented to person, place, and time.      Sensory: No sensory deficit.      Motor: No weakness.   Psychiatric:         Mood and Affect: Mood normal.         Behavior: Behavior normal.        Past Medical History:   Diagnosis Date    COPD (chronic obstructive pulmonary disease) (Multi)     Hypertension     Other specified health status     No pertinent past medical history      Past Surgical History:   Procedure Laterality Date    OTHER SURGICAL HISTORY  2022    Finger amputation      Social History     Socioeconomic History    Marital status:    Tobacco Use    Smoking status: Former     Current packs/day: 0.00     Types: Cigarettes     Quit date:      Years since quittin.2    Smokeless tobacco: Never   Substance and Sexual Activity    Alcohol use: Yes    Drug use: Never      ------------------------------------------------------------------------------------------------------------------------------------------  Vascular US upper extremity venous duplex left   Final Result   Partially occlusive left basilic vein thrombus. The remaining   evaluated veins are patent.        MACRO:   Critical Finding:  See findings. Notification was initiated on   2025 at 3:44 pm by  Ros Juan.  (**-OCF-**) Instructions:        Signed by: Ros Juan 2025 3:45 PM   Dictation workstation:   [id number]      CT head wo IV contrast   Final Result   No acute intracranial bleed or focal mass effect.        Mild volume loss.        Mild paranasal sinusitis without fluid levels..        MACRO:   None        Signed by: Brody Krishnamurthy 2025 2:53 PM   Dictation workstation:   [id number]      XR chest 1 view   Final Result   Medial bibasilar infiltrative  opacities, left greater than right,   similar to prior CT scan. These could be chronic scarring or   recurrent pneumonia. Clinical correlation needed.        Thoracic spine DJD as described.        Mild cardiomegaly.  Currently without radiographic evidence of CHF.        MACRO:   None        Signed by: Brody Krishnamurthy 4/1/2025 2:50 PM   Dictation workstation:   [id number]      XR wrist left 3+ views   Final Result   Partial amputations of the 2nd and 3rd digits as described.        Nonspecific soft tissue swelling in the left hand and wrist and also   the left 5th finger.        Slight flexion deformity at the 5th PIP joint.        Mild arthritic changes as described.        No soft tissue gas density or opaque foreign body. No destructive   bone lesion or acute fracture.        MACRO:   None        Signed by: Brody Krishnamurthy 4/1/2025 2:48 PM   Dictation workstation:   [id number]      XR hand left 3+ views   Final Result   Partial amputations of the 2nd and 3rd digits as described.        Nonspecific soft tissue swelling in the left hand and wrist and also   the left 5th finger.        Slight flexion deformity at the 5th PIP joint.        Mild arthritic changes as described.        No soft tissue gas density or opaque foreign body. No destructive   bone lesion or acute fracture.        MACRO:   None        Signed by: Brody Krishnamurthy 4/1/2025 2:48 PM   Dictation workstation:   [id number]         Labs Reviewed   CBC WITH AUTO DIFFERENTIAL - Abnormal       Result Value    WBC 8.3      nRBC 0.0      RBC 3.66 (*)     Hemoglobin 11.7 (*)     Hematocrit 36.7 (*)           MCH 32.0      MCHC 31.9 (*)     RDW 12.0      Platelets 191      Neutrophils % 80.1      Immature Granulocytes %, Automated 0.4      Lymphocytes % 5.3      Monocytes % 11.7      Eosinophils % 2.3      Basophils % 0.2      Neutrophils Absolute 6.63 (*)     Immature Granulocytes Absolute, Automated 0.03      Lymphocytes Absolute 0.44 (*)     Monocytes  Absolute 0.97 (*)     Eosinophils Absolute 0.19      Basophils Absolute 0.02     COMPREHENSIVE METABOLIC PANEL - Abnormal    Glucose 83      Sodium 135 (*)     Potassium 4.1      Chloride 89 (*)     Bicarbonate 38 (*)     Anion Gap 12      Urea Nitrogen 25 (*)     Creatinine 0.94      eGFR 82      Calcium 9.0      Albumin 3.8      Alkaline Phosphatase 37      Total Protein 7.6      AST 31      Bilirubin, Total 0.5      ALT 30     PROTIME-INR - Abnormal    Protime 13.2 (*)     INR 1.2 (*)    B-TYPE NATRIURETIC PEPTIDE - Abnormal     (*)     Narrative:        <100 pg/mL - Heart failure unlikely  100-299 pg/mL - Intermediate probability of acute heart                  failure exacerbation. Correlate with clinical                  context and patient history.    >=300 pg/mL - Heart Failure likely. Correlate with clinical                  context and patient history.    BNP testing is performed using different testing methodology at [facility] than at other [facility]. Direct result comparisons should only be made within the same method.      URINALYSIS WITH REFLEX CULTURE AND MICROSCOPIC - Abnormal    Color, Urine Light-Yellow      Appearance, Urine Clear      Specific Gravity, Urine 1.016      pH, Urine 5.5      Protein, Urine 20 (TRACE)      Glucose, Urine Normal      Blood, Urine NEGATIVE      Ketones, Urine 40 (2+) (*)     Bilirubin, Urine NEGATIVE      Urobilinogen, Urine Normal      Nitrite, Urine NEGATIVE      Leukocyte Esterase, Urine NEGATIVE     BLOOD GAS ARTERIAL FULL PANEL - Abnormal    POCT pH, Arterial 7.36 (*)     POCT pCO2, Arterial 66 (*)     POCT pO2, Arterial 75 (*)     POCT SO2, Arterial 97      POCT Oxy Hemoglobin, Arterial 94.1      POCT Hematocrit Calculated, Arterial 35.0 (*)     POCT Sodium, Arterial 133 (*)     POCT Potassium, Arterial 4.1      POCT Chloride, Arterial 93 (*)     POCT Ionized Calcium, Arterial 1.14      POCT Glucose, Arterial 84      POCT Lactate,  Arterial 0.8      POCT Base Excess, Arterial 9.6 (*)     POCT HCO3 Calculated, Arterial 37.3 (*)     POCT Hemoglobin, Arterial 11.7 (*)     POCT Anion Gap, Arterial 7 (*)     Patient Temperature        FiO2 6      Apparatus CANNULA      Site of Arterial Puncture Radial Right      Azam's Test Positive      Site of Arterial Puncture R RAD      Azam's Test POSITIVE     URIC ACID - Abnormal    Uric Acid 10.4 (*)    URINALYSIS MICROSCOPIC WITH REFLEX CULTURE - Abnormal    WBC, Urine NONE      RBC, Urine NONE      Mucus, Urine FEW      Hyaline Casts, Urine 2+ (*)     Fine Granular Casts, Urine OCCASIONAL (*)    MAGNESIUM - Normal    Magnesium 1.87     TROPONIN I, HIGH SENSITIVITY - Normal    Troponin I, High Sensitivity 8      Narrative:     Less than 99th percentile of normal range cutoff-  Female and children under 18 years old <14 ng/L; Male <21 ng/L: Negative  Repeat testing should be performed if clinically indicated.     Female and children under 18 years old 14-50 ng/L; Male 21-50 ng/L:  Consistent with possible cardiac damage and possible increased clinical   risk. Serial measurements may help to assess extent of myocardial damage.     >50 ng/L: Consistent with cardiac damage, increased clinical risk and  myocardial infarction. Serial measurements may help assess extent of   myocardial damage.      NOTE: Children less than 1 year old may have higher baseline troponin   levels and results should be interpreted in conjunction with the overall   clinical context.     NOTE: Troponin I testing is performed using a different   testing methodology at [facility] than at other   [facility]. Direct result comparisons should only   be made within the same method.   LACTATE - Normal    Lactate 1.1      Narrative:     Venipuncture immediately after or during the administration of Metamizole may lead to falsely low results. Testing should be performed immediately prior to Metamizole dosing.   ALCOHOL -  Normal    Alcohol <10     BLOOD CULTURE   BLOOD CULTURE   URINALYSIS WITH REFLEX CULTURE AND MICROSCOPIC    Narrative:     The following orders were created for panel order Urinalysis with Reflex Culture and Microscopic.  Procedure                               Abnormality         Status                     ---------                               -----------         ------                     Urinalysis with Reflex C...[[id number]]  Abnormal            Final result               Extra Urine Gray Tube[[id number]]                            In process                   Please view results for these tests on the individual orders.   EXTRA URINE GRAY TUBE        Medical Decision Making  EKG interpreted by ED physician: Normal sinus rhythm rate of 66.  ME and QTc within normal range.  QRS prolonged at 126 consistent with right bundle branch block.  No significant ST elevations or depressions.  No significant Q waves.  Good R wave progression.  Normal axis.    79-year-old male presents emergency department with chief complaint of left upper extremity erythema, warmth, and edema.  He also has a skin tear on the left arm that continues to bleed.  Shortly after the patient's arrival daughter arrived and states that he has been significantly more weak having difficulties getting around at home.  Given the patient's presenting symptoms a thorough workup was obtained.  CBC does not show significant leukocytosis and shows mild anemia.  Patient does not have findings of sepsis.  UA does not show evidence finding of infection.  BNP is mildly elevated though patient does not have findings of decompensated heart failure on imaging.  Blood alcohol is negative patient does not have obvious findings of alcohol withdrawal.  Cardiac enzyme and EKG do not show acute ACS.  CMP shows findings of mild dehydration with hyponatremia.  Bicarb is significantly elevated likely representing chronic hypercapnic respiratory failure.  Subsequent ABG  shows patient is compensated with hypercapnic and hypoxic respiratory failure.  Goals x-ray imaging does not show any acute bony abnormality and shows soft tissue swelling without gas.  Chest x-ray shows infiltrate that is chronic scarring versus pneumonia.  Patient reports his breathing is relatively at baseline and denies any productive cough or fevers.  Doubt acute pneumonia at this time.  Patient is covered with broad-spectrum antibiotics for concern of cellulitis of the left upper extremity with vancomycin and Zosyn.  Head CT showed no acute intracranial process such as hemorrhage or mass effect ultrasound DVT study shows partially occlusive left basilic vein thrombus.  I advised patient and family of findings.  I recommended admission for further treatment of his left arm cellulitis and swelling, weakness, and basilic vein thrombosis.  They are agreeable with this plan.  Case was discussed with hospitalist on-call.       Diagnoses as of 04/01/25 2124   Cellulitis of left upper extremity   Generalized weakness   Basilic vein thrombosis   Skin tear of forearm without complication, left, initial encounter   Chronic respiratory failure with hypoxia and hypercapnia   Abnormal CXR      1. Generalized weakness        2. Cellulitis of left upper extremity        3. Basilic vein thrombosis        4. Skin tear of forearm without complication, left, initial encounter        5. Chronic respiratory failure with hypoxia and hypercapnia        6. Abnormal CXR           Procedures     This note was dictated using dragon software and may contain errors related to dictation interpretation errors.      Aj Ward, DO  04/01/25 2124

## 2025-04-01 NOTE — H&P
Medical Group History and Physical  ASSESSMENT & PLAN:       LUE Cellulitis  -Given extent of edema/erythema will obtain CT of LUE for further characterization.   -ID consulted for eval, recs appreciated  -Cont Vanc/zosyn.   -Will send Bcx x2 as not ordered/obtained by ED    Superficial Vein Thrombosis   -Partially occlusive Basilic Vein thrombosis noted on LUE vascular US.   -Will manage with warm compresses/ibuprofen for now    Hx Gout  -Will send uric acid level and empirically cover for flare  -Colchicine 1.2 mg x1 + 0.6 mg BID therafter    Essential Tremor  -On primidone 100 mg BID, will continue    ETOH Dependence  -Drinks 2-3 beers daily. Denies hx of withdrawal  -Will monitor on CIWA  -Thiamine/folate/MV    Severe COPD  -On 6L O2 at baseline.   -Scheduled/PRN nebs. As not in exacerbation will defer steroid administration    Ambulatory dysfunction  -PT/OT eval appreciated    VTE Prophylaxis: Lovenox SubQ      ---Of note, this documentation is completed using the Dragon Dictation system (voice recognition software). There may be spelling and/or grammatical errors that were not corrected prior to final submission.---    Dre Vergara MD    HISTORY OF PRESENT ILLNESS:   Chief Complaint: Left hand swelling swelling    History Of Present Illness:    Rodrigo Conte is a 79 y.o. male with a past medical history including COPD, Chronic hypoxic respiratory failure on 6 L home O2,  Essential tremor, and HLD who was brought to hospital by daughter due to concerns of worsening left hand swelling as well as increased weakness in recent history.  Patient has noted progressing left hand/forearm swelling and pain over the course of the past 2 weeks.  Daughter states he was scratching his hand earlier today causing it to bleed which prompted her to bring him to hospital for evaluation.  Patient also reports feeling unwell.  Notes low energy.  Denied any fevers or chills.  Denies any chest pain.  Endorses chronic difficulty  "breathing but does not believe his breathing is any worse than usual.  Denies cough or sputum production.  Denies any added pain GI or  symptoms.  Further ROS was unremarkable.      Review of systems: 10 point review of systems is otherwise negative except as mentioned above.    PAST HISTORIES:     Past Medical History:  He has a past medical history of COPD (chronic obstructive pulmonary disease) (Multi), Hypertension, and Other specified health status.    Past Surgical History:  He has a past surgical history that includes Other surgical history (11/08/2022).      Social History:  He reports that he quit smoking about 28 years ago. His smoking use included cigarettes. He has never used smokeless tobacco. He reports current alcohol use. He reports that he does not use drugs.    Family History:  No family history on file.     Allergies:  Gemfibrozil    OBJECTIVE:     Last Recorded Vitals:  Vitals:    04/01/25 1446 04/01/25 1451 04/01/25 1712   BP: 123/63  131/67   BP Location:   Left arm   Patient Position:   Lying   Pulse: 65  67   Resp: 18  18   Temp: 36.3 °C (97.3 °F)     TempSrc: Temporal     SpO2: 100% 100% 100%   Weight: 72.6 kg (160 lb)     Height: 1.6 m (5' 3\")       Last I/O:  No intake/output data recorded.    Physical Exam  General: Ill-appearing elderly male in mild distress  HEENT: Clear sclera, EOMI, trachea midline, moist mucous membranes  Respiratory: Equal chest rise, no retractions, coarse breath sounds  Cardiovascular: S1 and S2 auscultated, no murmurs clicks or rubs  Abdomen: Soft, nontender, nondistended  Extremities: LUE erythema and swelling from phalanges to one third forearm.  Forearm wound covered in clean bandaging.  Neurological: Spontaneously moves all extremities, no dysarthria, cranial nerves grossly intact  Psychiatric: Appropriate mood and affect  Skin: Warm, dry      Scheduled Medications  piperacillin-tazobactam, 4.5 g, intravenous, Once  vancomycin, 2 g, intravenous, " Once      PRN Medications    Continuous Medications  oxygen, , Last Rate: 6 L/min (04/01/25 1450)  oxygen, , Last Rate: 6 L/min (04/01/25 8918)        Outpatient Medications:  Prior to Admission medications    Medication Sig Start Date End Date Taking? Authorizing Provider   albuterol 2.5 mg /3 mL (0.083 %) nebulizer solution Inhale 1 (one) time each day. 9/9/21   Historical Provider, MD   albuterol 90 mcg/actuation inhaler Inhale 2 puffs every 6 hours if needed for wheezing. 9/9/21   Historical Provider, MD   ammonium lactate (Lac-Hydrin) 12 % lotion twice a day. Apply and rub in a thin film to affected areas twice daily (AM and PM) 8/16/22   Historical Provider, MD   amoxicillin (Amoxil) 500 mg tablet Take 1 tablet (500 mg) by mouth every 12 hours. 6/11/23   Historical Provider, MD   atenolol (Tenormin) 50 mg tablet Take 1 tablet (50 mg) by mouth once daily. As directed 8/30/23   John Gonzalez MD   fluticasone furoate-vilanteroL (Breo Elipta) 100-25 mcg/dose inhaler Inhale 1 puff once daily. 8/25/22   Historical Provider, MD   folic acid (Folvite) 1 mg tablet Take 1 tablet (1 mg) by mouth once daily. 8/22/23   John Gonzalez MD   mirtazapine (Remeron) 15 mg tablet Take 1 tablet (15 mg) by mouth once daily at bedtime. 4/13/23   John Gonzalez MD   pravastatin (Pravachol) 40 mg tablet Take 1 tablet (40 mg) by mouth once daily. 10/4/23   John Gonzalez MD   predniSONE (Deltasone) 10 mg tablet  6/11/23   Historical Provider, MD   primidone (Mysoline) 50 mg tablet Take 1 tablet (50 mg) by mouth once daily in the evening. 4/13/23   John Gonzalez MD       LABS AND IMAGING:     Labs:  Results from last 7 days   Lab Units 04/01/25  1504   WBC AUTO x10*3/uL 8.3   RBC AUTO x10*6/uL 3.66*   HEMOGLOBIN g/dL 11.7*   HEMATOCRIT % 36.7*   MCV fL 100   MCH pg 32.0   MCHC g/dL 31.9*   RDW % 12.0   PLATELETS AUTO x10*3/uL 191     Results from last 7 days   Lab Units 04/01/25  1504   SODIUM mmol/L 135*   POTASSIUM mmol/L  4.1   CHLORIDE mmol/L 89*   CO2 mmol/L 38*   BUN mg/dL 25*   CREATININE mg/dL 0.94   GLUCOSE mg/dL 83   PROTEIN TOTAL g/dL 7.6   CALCIUM mg/dL 9.0   BILIRUBIN TOTAL mg/dL 0.5   ALK PHOS U/L 37   AST U/L 31   ALT U/L 30     Results from last 7 days   Lab Units 04/01/25  1504   MAGNESIUM mg/dL 1.87     Results from last 7 days   Lab Units 04/01/25  1504   TROPHS ng/L 8       Imaging:  Vascular US upper extremity venous duplex left  Narrative: Interpreted By:  Ros Juan,   STUDY:  [facility] US UPPER EXTREMITY VENOUS DUPLEX LEFT;  4/1/2025 3:36 pm      INDICATION:  Signs/Symptoms:left hand swelling.          COMPARISON:  None.      ACCESSION NUMBER(S):  [id number]      ORDERING CLINICIAN:  EMANUEL RIVERA      TECHNIQUE:  Vascular ultrasound of the left upper extremity was performed.  Evaluation was performed with grayscale, color, and spectral Doppler.  When possible, compression views of the evaluated veins was also  performed.      FINDINGS:  LEFT UPPER EXTREMITY:  Evaluation of the visualized portions of the left internal jugular,  innominate, subclavian, axillary, brachial, cephalic, and basilic  veins was performed.      A partially occlusive thrombus is seen in the left basilic vein. The  remaining evaluated veins are patent.      Impression: Partially occlusive left basilic vein thrombus. The remaining  evaluated veins are patent.      MACRO:  Critical Finding:  See findings. Notification was initiated on  4/1/2025 at 3:44 pm by  Ros Juan.  (**-OCF-**) Instructions:      Signed by: Ros Juan 4/1/2025 3:45 PM  Dictation workstation:   [id number]  ECG 12 lead  Normal sinus rhythm  Right bundle branch block  Abnormal ECG  When compared with ECG of 03-JUL-2022 21:24,  CT interval has decreased  CT head wo IV contrast  Narrative: Interpreted By:  Brody Krishnamurthy,   STUDY:  CT HEAD WO IV CONTRAST;  4/1/2025 2:42 pm      INDICATION:  Signs/Symptoms:weakness.      COMPARISON:  None.      ACCESSION  NUMBER(S):  [id number]      ORDERING CLINICIAN:  EMANUEL RIVERA      TECHNIQUE:  Routine axial images were obtained from the skull base through the  vertex.  Sagittal and coronal reconstruction images were generated.  Brain, subdural, and bone windows were reviewed. N/A   N/A      FINDINGS:  INTRACRANIAL:  Mild prominence of ventricles and sulci. There is mild patchy  hypodensity throughout the deep periventricular white matter. No  acute intracranial bleed, midline shift, or focal mass effect. No  destructive bone lesion. No depressed skull fracture. Skullbase  arterial calcifications in the carotid siphons and vertebral arteries.      EXTRACRANIAL:  Mild mucosal thickening along the floor of each maxillary sinus.  Small retention cyst in the right frontal sinus. Otherwise,  visualized paranasal sinuses were clear. Visualized mastoid air cells  were clear.      Impression: No acute intracranial bleed or focal mass effect.      Mild volume loss.      Mild paranasal sinusitis without fluid levels..      MACRO:  None      Signed by: Brody Krishnamurthy 4/1/2025 2:53 PM  Dictation workstation:   [id number]  XR chest 1 view  Narrative: Interpreted By:  Brody Krishnamurthy,   STUDY:  XR CHEST 1 VIEW;  4/1/2025 2:30 pm      INDICATION:  Signs/Symptoms:peripheral edema.      COMPARISON:  Chest x-ray from 07/03/2022. CT scan chest from 09/14/2023.      ACCESSION NUMBER(S):  [id number]      ORDERING CLINICIAN:  EMANUEL RIVERA      TECHNIQUE:  Single AP portable view of the chest was obtained.      FINDINGS:  MEDIASTINUM/ LUNGS/ NIKKO:  Mild cardiomegaly, currently without vascular congestion, or pleural  effusion. There are mild persistent infiltrative opacities at the  medial lung bases, left greater than right. No masslike opacity in  either lung. Calcification present in the aorta. No pneumothorax.  No tracheal deviation.  No abnormal hilar fullness or gross mass on either side.      BONES:  No lytic or blastic destructive bone  lesion.  There is  mild-to-moderate disc space narrowing and endplate osteophytosis  throughout the thoracic spine.      UPPER ABDOMEN:  Grossly intact.      Impression: Medial bibasilar infiltrative opacities, left greater than right,  similar to prior CT scan. These could be chronic scarring or  recurrent pneumonia. Clinical correlation needed.      Thoracic spine DJD as described.      Mild cardiomegaly.  Currently without radiographic evidence of CHF.      MACRO:  None      Signed by: Brody Krishnamurthy 4/1/2025 2:50 PM  Dictation workstation:   [id number]  XR hand left 3+ views, XR wrist left 3+ views  Narrative: Interpreted By:  Brody Krishnamurthy,   STUDY:  XR HAND LEFT 3+ VIEWS; XR WRIST LEFT 3+ VIEWS;  4/1/2025 2:29 pm;  4/1/2025 2:30 pm      INDICATION:  Signs/Symptoms:wound and swelling; Signs/Symptoms:wound swelling.      COMPARISON:  None.      ACCESSION NUMBER(S):  [id number]; [id number]      ORDERING CLINICIAN:  EMANUEL RIVERA      TECHNIQUE:  Three views each of the left hand and left wrist were obtained.      FINDINGS:  Previous amputation of the 2nd digit across the proximal end of the  middle phalanx. Previous amputation of the 3rd digit distal to the  proximal phalanx. There is diffuse soft tissue swelling throughout  the left hand and wrist, and also throughout the 5th finger. There is  mild erosive arthritic change along the proximal articular surface of  the lunate. There is mild joint space loss with spurring in the 5th  PIP joint and there is a mild flexion deformity at that joint. Slight  spurring in the 1st IP joint. No soft tissue gas density. No  significant osteophytic change. No lytic or blastic destructive bone  lesion. No acute fracture or dislocation.  No opaque soft tissue foreign body.  No periosteal reaction or erosion.      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      Nonspecific soft tissue swelling in the left hand and wrist and also  the left 5th  finger.      Slight flexion deformity at the 5th PIP joint.      Mild arthritic changes as described.      No soft tissue gas density or opaque foreign body. No destructive  bone lesion or acute fracture.      MACRO:  None      Signed by: Brody Krishnamurthy 4/1/2025 2:48 PM  Dictation workstation:   [id number]

## 2025-04-02 ENCOUNTER — APPOINTMENT (OUTPATIENT)
Dept: RADIOLOGY | Facility: HOSPITAL | Age: 80
DRG: 300 | End: 2025-04-02
Payer: MEDICARE

## 2025-04-02 LAB
ANION GAP SERPL CALC-SCNC: 11 MMOL/L (ref 10–20)
BASOPHILS # BLD AUTO: 0.02 X10*3/UL (ref 0–0.1)
BASOPHILS NFR BLD AUTO: 0.3 %
BUN SERPL-MCNC: 24 MG/DL (ref 6–23)
CALCIUM SERPL-MCNC: 8.5 MG/DL (ref 8.6–10.3)
CHLORIDE SERPL-SCNC: 90 MMOL/L (ref 98–107)
CO2 SERPL-SCNC: 37 MMOL/L (ref 21–32)
CREAT SERPL-MCNC: 1.14 MG/DL (ref 0.5–1.3)
EGFRCR SERPLBLD CKD-EPI 2021: 65 ML/MIN/1.73M*2
EOSINOPHIL # BLD AUTO: 0.22 X10*3/UL (ref 0–0.4)
EOSINOPHIL NFR BLD AUTO: 3.2 %
ERYTHROCYTE [DISTWIDTH] IN BLOOD BY AUTOMATED COUNT: 11.9 % (ref 11.5–14.5)
GLUCOSE SERPL-MCNC: 113 MG/DL (ref 74–99)
HCT VFR BLD AUTO: 33.3 % (ref 41–52)
HGB BLD-MCNC: 10.7 G/DL (ref 13.5–17.5)
HOLD SPECIMEN: NORMAL
HOLD SPECIMEN: NORMAL
IMM GRANULOCYTES # BLD AUTO: 0.02 X10*3/UL (ref 0–0.5)
IMM GRANULOCYTES NFR BLD AUTO: 0.3 % (ref 0–0.9)
LYMPHOCYTES # BLD AUTO: 0.84 X10*3/UL (ref 0.8–3)
LYMPHOCYTES NFR BLD AUTO: 12.1 %
MAGNESIUM SERPL-MCNC: 1.72 MG/DL (ref 1.6–2.4)
MCH RBC QN AUTO: 31.6 PG (ref 26–34)
MCHC RBC AUTO-ENTMCNC: 32.1 G/DL (ref 32–36)
MCV RBC AUTO: 98 FL (ref 80–100)
MONOCYTES # BLD AUTO: 0.85 X10*3/UL (ref 0.05–0.8)
MONOCYTES NFR BLD AUTO: 12.3 %
NEUTROPHILS # BLD AUTO: 4.97 X10*3/UL (ref 1.6–5.5)
NEUTROPHILS NFR BLD AUTO: 71.8 %
NRBC BLD-RTO: 0 /100 WBCS (ref 0–0)
PLATELET # BLD AUTO: 188 X10*3/UL (ref 150–450)
POTASSIUM SERPL-SCNC: 3.4 MMOL/L (ref 3.5–5.3)
RBC # BLD AUTO: 3.39 X10*6/UL (ref 4.5–5.9)
SODIUM SERPL-SCNC: 135 MMOL/L (ref 136–145)
VANCOMYCIN SERPL-MCNC: 17.6 UG/ML (ref 5–20)
VANCOMYCIN SERPL-MCNC: 23.6 UG/ML (ref 5–20)
WBC # BLD AUTO: 6.9 X10*3/UL (ref 4.4–11.3)

## 2025-04-02 PROCEDURE — 73201 CT UPPER EXTREMITY W/DYE: CPT | Mod: LEFT SIDE | Performed by: RADIOLOGY

## 2025-04-02 PROCEDURE — 99232 SBSQ HOSP IP/OBS MODERATE 35: CPT | Performed by: STUDENT IN AN ORGANIZED HEALTH CARE EDUCATION/TRAINING PROGRAM

## 2025-04-02 PROCEDURE — 2500000005 HC RX 250 GENERAL PHARMACY W/O HCPCS: Performed by: STUDENT IN AN ORGANIZED HEALTH CARE EDUCATION/TRAINING PROGRAM

## 2025-04-02 PROCEDURE — 97161 PT EVAL LOW COMPLEX 20 MIN: CPT | Mod: GP | Performed by: PHYSICAL THERAPIST

## 2025-04-02 PROCEDURE — 36415 COLL VENOUS BLD VENIPUNCTURE: CPT | Performed by: STUDENT IN AN ORGANIZED HEALTH CARE EDUCATION/TRAINING PROGRAM

## 2025-04-02 PROCEDURE — 2500000001 HC RX 250 WO HCPCS SELF ADMINISTERED DRUGS (ALT 637 FOR MEDICARE OP): Performed by: STUDENT IN AN ORGANIZED HEALTH CARE EDUCATION/TRAINING PROGRAM

## 2025-04-02 PROCEDURE — 94640 AIRWAY INHALATION TREATMENT: CPT

## 2025-04-02 PROCEDURE — 2500000002 HC RX 250 W HCPCS SELF ADMINISTERED DRUGS (ALT 637 FOR MEDICARE OP, ALT 636 FOR OP/ED): Performed by: STUDENT IN AN ORGANIZED HEALTH CARE EDUCATION/TRAINING PROGRAM

## 2025-04-02 PROCEDURE — 85025 COMPLETE CBC W/AUTO DIFF WBC: CPT | Performed by: STUDENT IN AN ORGANIZED HEALTH CARE EDUCATION/TRAINING PROGRAM

## 2025-04-02 PROCEDURE — 80202 ASSAY OF VANCOMYCIN: CPT | Performed by: STUDENT IN AN ORGANIZED HEALTH CARE EDUCATION/TRAINING PROGRAM

## 2025-04-02 PROCEDURE — 2500000001 HC RX 250 WO HCPCS SELF ADMINISTERED DRUGS (ALT 637 FOR MEDICARE OP): Performed by: INTERNAL MEDICINE

## 2025-04-02 PROCEDURE — 2550000001 HC RX 255 CONTRASTS: Performed by: STUDENT IN AN ORGANIZED HEALTH CARE EDUCATION/TRAINING PROGRAM

## 2025-04-02 PROCEDURE — 97165 OT EVAL LOW COMPLEX 30 MIN: CPT | Mod: GO

## 2025-04-02 PROCEDURE — 1100000001 HC PRIVATE ROOM DAILY

## 2025-04-02 PROCEDURE — 87075 CULTR BACTERIA EXCEPT BLOOD: CPT | Mod: ELYLAB | Performed by: INTERNAL MEDICINE

## 2025-04-02 PROCEDURE — 83735 ASSAY OF MAGNESIUM: CPT | Performed by: STUDENT IN AN ORGANIZED HEALTH CARE EDUCATION/TRAINING PROGRAM

## 2025-04-02 PROCEDURE — 2500000004 HC RX 250 GENERAL PHARMACY W/ HCPCS (ALT 636 FOR OP/ED): Performed by: STUDENT IN AN ORGANIZED HEALTH CARE EDUCATION/TRAINING PROGRAM

## 2025-04-02 PROCEDURE — 80048 BASIC METABOLIC PNL TOTAL CA: CPT | Performed by: STUDENT IN AN ORGANIZED HEALTH CARE EDUCATION/TRAINING PROGRAM

## 2025-04-02 PROCEDURE — 2500000004 HC RX 250 GENERAL PHARMACY W/ HCPCS (ALT 636 FOR OP/ED): Performed by: INTERNAL MEDICINE

## 2025-04-02 PROCEDURE — 87081 CULTURE SCREEN ONLY: CPT | Mod: ELYLAB | Performed by: INTERNAL MEDICINE

## 2025-04-02 PROCEDURE — 2500000004 HC RX 250 GENERAL PHARMACY W/ HCPCS (ALT 636 FOR OP/ED)

## 2025-04-02 PROCEDURE — 73201 CT UPPER EXTREMITY W/DYE: CPT | Mod: LT

## 2025-04-02 PROCEDURE — 99232 SBSQ HOSP IP/OBS MODERATE 35: CPT | Performed by: INTERNAL MEDICINE

## 2025-04-02 RX ORDER — POTASSIUM CHLORIDE 20 MEQ/1
40 TABLET, EXTENDED RELEASE ORAL ONCE
Status: COMPLETED | OUTPATIENT
Start: 2025-04-02 | End: 2025-04-02

## 2025-04-02 RX ORDER — POTASSIUM CHLORIDE 14.9 MG/ML
20 INJECTION INTRAVENOUS ONCE
Status: COMPLETED | OUTPATIENT
Start: 2025-04-02 | End: 2025-04-02

## 2025-04-02 RX ORDER — L. ACIDOPHILUS/L.BULGARICUS 1MM CELL
1 TABLET ORAL 2 TIMES DAILY
Status: DISCONTINUED | OUTPATIENT
Start: 2025-04-02 | End: 2025-04-06 | Stop reason: HOSPADM

## 2025-04-02 RX ORDER — VANCOMYCIN HYDROCHLORIDE 1 G/200ML
1000 INJECTION, SOLUTION INTRAVENOUS EVERY 24 HOURS
Status: DISCONTINUED | OUTPATIENT
Start: 2025-04-02 | End: 2025-04-04

## 2025-04-02 RX ADMIN — CEFAZOLIN SODIUM 1 G: 1 INJECTION, SOLUTION INTRAVENOUS at 15:22

## 2025-04-02 RX ADMIN — Medication 6 L/MIN: at 19:53

## 2025-04-02 RX ADMIN — VANCOMYCIN HYDROCHLORIDE 1000 MG: 1 INJECTION, SOLUTION INTRAVENOUS at 18:17

## 2025-04-02 RX ADMIN — IBUPROFEN 600 MG: 600 TABLET, FILM COATED ORAL at 08:12

## 2025-04-02 RX ADMIN — FOLIC ACID 1 MG: 1 TABLET ORAL at 08:12

## 2025-04-02 RX ADMIN — PRIMIDONE 50 MG: 50 TABLET ORAL at 20:44

## 2025-04-02 RX ADMIN — Medication 1 TABLET: at 08:12

## 2025-04-02 RX ADMIN — IPRATROPIUM BROMIDE AND ALBUTEROL SULFATE 3 ML: 2.5; .5 SOLUTION RESPIRATORY (INHALATION) at 01:50

## 2025-04-02 RX ADMIN — PRIMIDONE 50 MG: 50 TABLET ORAL at 08:12

## 2025-04-02 RX ADMIN — Medication 1 TABLET: at 23:28

## 2025-04-02 RX ADMIN — POTASSIUM CHLORIDE 20 MEQ: 14.9 INJECTION, SOLUTION INTRAVENOUS at 09:56

## 2025-04-02 RX ADMIN — IPRATROPIUM BROMIDE AND ALBUTEROL SULFATE 3 ML: 2.5; .5 SOLUTION RESPIRATORY (INHALATION) at 07:52

## 2025-04-02 RX ADMIN — IOHEXOL 75 ML: 350 INJECTION, SOLUTION INTRAVENOUS at 11:16

## 2025-04-02 RX ADMIN — Medication 6 L/MIN: at 07:52

## 2025-04-02 RX ADMIN — CEFAZOLIN SODIUM 1 G: 1 INJECTION, SOLUTION INTRAVENOUS at 06:18

## 2025-04-02 RX ADMIN — COLCHICINE 0.6 MG: 0.6 TABLET, FILM COATED ORAL at 20:44

## 2025-04-02 RX ADMIN — POTASSIUM CHLORIDE 40 MEQ: 1500 TABLET, EXTENDED RELEASE ORAL at 09:56

## 2025-04-02 RX ADMIN — IPRATROPIUM BROMIDE AND ALBUTEROL SULFATE 3 ML: 2.5; .5 SOLUTION RESPIRATORY (INHALATION) at 19:53

## 2025-04-02 RX ADMIN — Medication 6 L/MIN: at 13:04

## 2025-04-02 RX ADMIN — Medication 6 L/MIN: at 01:50

## 2025-04-02 RX ADMIN — ENOXAPARIN SODIUM 40 MG: 40 INJECTION SUBCUTANEOUS at 20:44

## 2025-04-02 RX ADMIN — COLCHICINE 0.6 MG: 0.6 TABLET, FILM COATED ORAL at 08:12

## 2025-04-02 RX ADMIN — IPRATROPIUM BROMIDE AND ALBUTEROL SULFATE 3 ML: 2.5; .5 SOLUTION RESPIRATORY (INHALATION) at 13:04

## 2025-04-02 RX ADMIN — IBUPROFEN 600 MG: 600 TABLET, FILM COATED ORAL at 20:44

## 2025-04-02 ASSESSMENT — LIFESTYLE VARIABLES
HEADACHE, FULLNESS IN HEAD: NOT PRESENT
VISUAL DISTURBANCES: NOT PRESENT
TREMOR: NO TREMOR
HEADACHE, FULLNESS IN HEAD: NOT PRESENT
AUDITORY DISTURBANCES: NOT PRESENT
AUDITORY DISTURBANCES: NOT PRESENT
TOTAL SCORE: 0
ORIENTATION AND CLOUDING OF SENSORIUM: ORIENTED AND CAN DO SERIAL ADDITIONS
PAROXYSMAL SWEATS: NO SWEAT VISIBLE
VISUAL DISTURBANCES: NOT PRESENT
TOTAL SCORE: 0
TREMOR: NO TREMOR
ANXIETY: NO ANXIETY, AT EASE
PAROXYSMAL SWEATS: NO SWEAT VISIBLE
NAUSEA AND VOMITING: NO NAUSEA AND NO VOMITING
ANXIETY: NO ANXIETY, AT EASE
NAUSEA AND VOMITING: NO NAUSEA AND NO VOMITING
ORIENTATION AND CLOUDING OF SENSORIUM: ORIENTED AND CAN DO SERIAL ADDITIONS
AGITATION: NORMAL ACTIVITY
AGITATION: NORMAL ACTIVITY

## 2025-04-02 ASSESSMENT — COGNITIVE AND FUNCTIONAL STATUS - GENERAL
TURNING FROM BACK TO SIDE WHILE IN FLAT BAD: A LITTLE
MOVING TO AND FROM BED TO CHAIR: A LOT
DRESSING REGULAR UPPER BODY CLOTHING: A LITTLE
MOVING TO AND FROM BED TO CHAIR: A LITTLE
STANDING UP FROM CHAIR USING ARMS: A LOT
STANDING UP FROM CHAIR USING ARMS: A LITTLE
WALKING IN HOSPITAL ROOM: A LITTLE
TURNING FROM BACK TO SIDE WHILE IN FLAT BAD: A LOT
MOBILITY SCORE: 11
DRESSING REGULAR LOWER BODY CLOTHING: A LOT
HELP NEEDED FOR BATHING: A LOT
CLIMB 3 TO 5 STEPS WITH RAILING: A LOT
TOILETING: A LITTLE
DAILY ACTIVITIY SCORE: 21
STANDING UP FROM CHAIR USING ARMS: A LITTLE
CLIMB 3 TO 5 STEPS WITH RAILING: A LOT
DRESSING REGULAR LOWER BODY CLOTHING: A LOT
MOBILITY SCORE: 18
DAILY ACTIVITIY SCORE: 16
DAILY ACTIVITIY SCORE: 16
WALKING IN HOSPITAL ROOM: A LOT
TOILETING: A LOT
PERSONAL GROOMING: A LITTLE
MOVING TO AND FROM BED TO CHAIR: A LITTLE
CLIMB 3 TO 5 STEPS WITH RAILING: TOTAL
TURNING FROM BACK TO SIDE WHILE IN FLAT BAD: A LITTLE
MOVING FROM LYING ON BACK TO SITTING ON SIDE OF FLAT BED WITH BEDRAILS: A LITTLE
HELP NEEDED FOR BATHING: A LITTLE
WALKING IN HOSPITAL ROOM: TOTAL
HELP NEEDED FOR BATHING: A LOT
MOBILITY SCORE: 17
DRESSING REGULAR LOWER BODY CLOTHING: A LITTLE
PERSONAL GROOMING: A LITTLE
DRESSING REGULAR UPPER BODY CLOTHING: A LOT
TOILETING: A LITTLE

## 2025-04-02 ASSESSMENT — PAIN SCALES - GENERAL
PAINLEVEL_OUTOF10: 5 - MODERATE PAIN
PAINLEVEL_OUTOF10: 5 - MODERATE PAIN
PAINLEVEL_OUTOF10: 0 - NO PAIN

## 2025-04-02 ASSESSMENT — ACTIVITIES OF DAILY LIVING (ADL)
BATHING_ASSISTANCE: MAXIMAL
LACK_OF_TRANSPORTATION: NO

## 2025-04-02 ASSESSMENT — PAIN - FUNCTIONAL ASSESSMENT
PAIN_FUNCTIONAL_ASSESSMENT: 0-10
PAIN_FUNCTIONAL_ASSESSMENT: 0-10

## 2025-04-02 NOTE — PROGRESS NOTES
04/02/25 1344   Discharge Planning   Living Arrangements Children  (lives with daughter who works FT)   Support Systems Children   Assistance Needed none, pt states PTA Independent ADLS with cane, dtr does laundry and assists with med mgmt. Pt reports waiting to shower until dtr is home from work. Reports one fall, able to get up, does not drive, pt uses 6 LPM continous Home O2- from [facility]   Type of Residence Private residence  (1 level home, 3 [facility] with Bilateral Handrails. Tub/shower with seat and grab bar)   Number of Stairs to Enter Residence 3  (with handrails)   Number of Stairs Within Residence 0   Do you have animals or pets at home? Yes   Type of Animals or Pets 1 Dog   Home or Post Acute Services Post acute facilities (Rehab/SNF/etc)   Type of Post Acute Facility Services Rehab;Skilled nursing   Expected Discharge Disposition SNF   Does the patient need discharge transport arranged? Yes   RoundTrip coordination needed? Yes   Has discharge transport been arranged? No   Financial Resource Strain   How hard is it for you to pay for the very basics like food, housing, medical care, and heating? Not hard   Housing Stability   In the last 12 months, was there a time when you were not able to pay the mortgage or rent on time? N   In the past 12 months, how many times have you moved where you were living? 0   At any time in the past 12 months, were you homeless or living in a shelter (including now)? N   Transportation Needs   In the past 12 months, has lack of transportation kept you from medical appointments or from getting medications? no   In the past 12 months, has lack of transportation kept you from meetings, work, or from getting things needed for daily living? No   Patient Choice   Provider Choice list and CMS website (https://medicare.gov/care-compare#search) for post-acute Quality and Resource Measure Data were provided and reviewed with: Patient;Family   Patient / Family choosing to utilize agency  "/ facility established prior to hospitalization No   Stroke Family Assessment   Stroke Family Assessment Needed No   Intensity of Service   Intensity of Service >30 min     Pt admitted with generalized weakness, left upper extremity swelling, pain and erythema x 1-2 weeks. ID following, for LUE cellulitis, blood cultures x 2 pending, pt receiving IV antibiotics. Pt from home with daughter who works FT, ambulates with cane, pt has severe COPD and uses 6 LPM continuous oxygen at baseline. Pt states drinks 3 beers daily, is on CIWA, pt states has never detoxed, and has no intention on quitting, stats \" I like my beer.\" Pt PCP is Dr. Alston, Pharmacy Is [facility] in [place]. Pt denies barriers to appointments or medications. [facility] scores are PT (11) OT (16) recommending continued therapy at moderate level/intensity. Pt provided SNF list, states will look over with his daughter. Pt is Medicare insurance that will require 3 IP midnights to qualify. CT team will continue monitoring case for progression and DC planning.   "

## 2025-04-02 NOTE — PROGRESS NOTES
Vancomycin Dosing by Pharmacy- FOLLOW UP    Rodrigo Conte is a 79 y.o. year old male who Pharmacy has been consulted for vancomycin dosing for cellulitis, skin and soft tissue. Based on the patient's indication and renal status this patient is being dosed based on a goal AUC of 400-600.     Renal function is currently declining.    Current vancomycin dose: 1250 mg given every 24 hours    Estimated vancomycin AUC on current dose: 559 mg/L.hr     Visit Vitals  /54   Pulse 67   Temp 36.1 °C (97 °F) (Temporal)   Resp 18        Lab Results   Component Value Date    CREATININE 1.14 2025    CREATININE 0.94 2025    CREATININE 1.05 2023    CREATININE 1.12 2023    CREATININE 0.69 2022    CREATININE 0.65 2022        Patient weight is as follows:   Vitals:    25   Weight: 69.2 kg (152 lb 8.9 oz)       Cultures:  No results found for the encounter in last 14 days.       No intake/output data recorded.  I/O during current shift:  I/O this shift:  In: 550 [IV Piggyback:550]  Out: -     Temp (24hrs), Av.1 °C (97 °F), Min:35.9 °C (96.6 °F), Max:36.3 °C (97.3 °F)      Assessment/Plan    Above goal AUC. Orders placed for new vancomcyin regimen of 1000 mg every 24 hours to begin at 1800. Due to decline in renal function from 57 ml/min yesterday to 45 ml/min today, will decrease dose.     This dosing regimen is predicted by [employer] to result in the following pharmacokinetic parameters:  Regimen: 1000 mg IV every 24 hours.  Start time: 18:26 on 2025  Exposure target: AUC24 (range)400-600 mg/L.hr   [id number]: 469 mg/L.hr  AUC24,ss: 457 mg/L.hr  Probability of AUC24 > 400: 74 %  Ctrough,ss: 13.5 mg/L  Probability of Ctrough,ss > 20: 9 %    The next level will be obtained on  at 0500. May be obtained sooner if clinically indicated.   Will continue to monitor renal function daily while on vancomycin and order serum creatinine at least every 48 hours if not already  ordered.  Follow for continued vancomycin needs, clinical response, and signs/symptoms of toxicity.       Guera Montes, PharmD

## 2025-04-02 NOTE — PROGRESS NOTES
Occupational Therapy    Evaluation    Patient Name: Rodrigo Conte  MRN: [id number]  : 1945  Today's Date: 25  Time Calculation  Start Time: 731  Stop Time: 755  Time Calculation (min): 24 min     623/623-A    Assessment:  OT Assessment: Pt presents with decreased indep with ADLS and functional mobility, will benefit from con't skilled OT  Prognosis: Good  Barriers to Discharge Home: Physical needs, Caregiver assistance  Caregiver Assistance: Caregiver assistance needed per identified barriers - however, level of patient's required assistance exceeds assistance available at home (dtr works outside of home 8 hours /day)  Physical Needs: 24hr mobility assistance needed, 24hr ADL assistance needed, Stair navigation into home limited by function/safety  End of Session Patient Position: Up in chair, Alarm on (call light in reach, resp therapist present for breathing tx)  OT Assessment Results: Decreased ADL status, Decreased endurance, Decreased functional mobility  Prognosis: Good    Plan:  Treatment Interventions: ADL retraining, Functional transfer training, Endurance training, Patient/family training  OT Frequency: 2 times per week  OT Discharge Recommendations: Moderate intensity level of continued care  OT Recommended Transfer Status: Moderate assist  OT - OK to Discharge: Yes  Treatment Interventions: ADL retraining, Functional transfer training, Endurance training, Patient/family training  Subjective     Current Problem:  1. Generalized weakness        2. Cellulitis of left upper extremity        3. Basilic vein thrombosis        4. Skin tear of forearm without complication, left, initial encounter        5. Chronic respiratory failure with hypoxia and hypercapnia        6. Abnormal CXR                General:   OT Received On: 25  General  Reason for Referral: ADL impairment  Referred By: Ursula PT/OT eval and tx 25  Past Medical History Relevant to Rehab: COPD, dyslipidemia, HTN, 6Lo2 home, L  IF and MF amp from saw accident 40 yrs ago. ETOH, covid 19, tremors, OA R foot  Family/Caregiver Present: No  Co-Treatment: PT  Co-Treatment Reason: to maximize pt/staff safety  Patient Position Received: Bed, 3 rail up, Alarm off, not on at start of session (on 6Lo2, has male external catheter)  General Comment: pt to ED 4/1 with LE and L hand /forearm edema. Dx : cellulitis L UE, basilic vein thrombus L UE    Precautions:  Medical Precautions: Fall precautions, Oxygen therapy device and L/min    Vital Signs:  SpO2: (!) 88 % (on 6Lo2 after transfer from bed to BSC to chair)    Pain:  Pain Assessment  Pain Assessment: 0-10  0-10 (Numeric) Pain Score: 5 - Moderate pain  Pain Type: Acute pain  Pain Location: Hand  Pain Orientation: Left  Objective     Cognition:  Overall Cognitive Status: Impaired  Arousal/Alertness:  (pt lethargic, increased alertness as eval progressed)  Orientation Level: Oriented X4  Following Commands:  (follows one step commands 75% of the time, requires repetiion of instruction)  Attention:  (easily distracted)  Processing Speed: Delayed             Home Living:  Home Living Comments: lives with dtr who works. Pt reports being alone 8 hours/day while dtr is at work. 1 story, 3 KUSH with B HR. Tub/shower with seat and grab bar    Prior Function:  Hand Dominance: Right  Prior Function Comments: per pt, indep with aDLS, dtr does laundry and assists with med mgmt. Pt reports waiting to shower until dtr is home from work. Reports one fall, able to get up, does not drive.Amb with SPC           Activities of Daily Living:   Eating Assistance: Independent  Grooming Assistance: Minimal  Bathing Assistance: Maximal  UE Dressing Assistance: Minimal  LE Dressing Assistance: Maximal  Toileting Assistance with Device: Maximal  Toileting Deficit: Clothing management up, Clothing management down, Perineal hygiene, Bedside commode (has male external catheter)  Functional Assistance:  (pt ambulated 10' with SPC  with mod A, tends to use cane B hands)                         Activity Tolerance:  Endurance: Decreased tolerance for upright activites           Bed Mobility/Transfers: Bed Mobility  Bed Mobility:  (sup to sit mod a to lift trunk up off mattress on L side of bed, cues to initiate task)  Transfers  Transfer:  (sit to stand from EOB mod a x 2, BSC transfer mod A, stand to sit at recliner chair with mod A)                Balance:  Static Sitting: good  Dynamic Sitting: good  Static Standing: fair +  Dynamic Standing: fair       Vision:Vision - Basic Assessment  Current Vision: Wears glasses all the time        Sensation:  Light Touch: No apparent deficits    Strength:  Strength Comments: B UE 4/5 throughout, L hand and wrist strength NT            Extremities: RUE   RUE : Within Functional Limits and LUE   LUE:  (L wrist and hand impaired due to increased edema, L UE warm to touch, redness noted L hand and wrist)    Outcome Measures: [facility] Daily Activity  Putting on and taking off regular lower body clothing: A lot  Bathing (including washing, rinsing, drying): A lot  Putting on and taking off regular upper body clothing: A little  Toileting, which includes using toilet, bedpan or urinal: A lot  Taking care of personal grooming such as brushing teeth: A little  Eating Meals: None  Daily Activity - Total Score: 16    Education Documentation  ADL Training, taught by Isabel Callaway OT at 4/2/2025 10:55 AM.  Learner: Patient  Readiness: Acceptance  Method: Explanation, Demonstration  Response: Verbalizes Understanding, Needs Reinforcement                 Goals:  Encounter Problems       Encounter Problems (Active)       OT Goals       pt will dress LB with modified indep (Progressing)       Start:  04/02/25    Expected End:  04/16/25            Pt will verbalize 3 energy conservation strategies to increase indep with aDLS (Progressing)       Start:  04/02/25    Expected End:  04/16/25            Pt will transfer to bed,  chair ,toilet with SBA (Progressing)       Start:  04/02/25    Expected End:  04/16/25            Pt will attend to ADL task x 5 minutes with less than 3 vc's to redirect (Progressing)       Start:  04/02/25    Expected End:  04/16/25

## 2025-04-02 NOTE — CONSULTS
Consults      ID Consult:     HPI  79-year-old male with progressive left upper extremity swelling and pain with erythema over the past week or 2.  Apparently there is a history of scratching his hand causing it to bleed which prompted further evaluation in the emergency department.  No leukocytosis or fevers or chills    All 14 ROS discussed with the patient and negative other than as stated as above    Subjectively feels okay at the present time.  There is a dressing over his left wrist/hand where he was previously bleeding.  The area is dry with Kerlix and there is no sign of any active drainage     has a past medical history of COPD (chronic obstructive pulmonary disease) (Multi), Hypertension, and Other specified health status.     has a past surgical history that includes Other surgical history (11/08/2022).     reports that he quit smoking about 28 years ago. His smoking use included cigarettes. He has never used smokeless tobacco. He reports current alcohol use. He reports that he does not use drugs.    No particular family history at this time      Physical exam  Vitals:    04/01/25 2126   BP: 139/69   Pulse: 61   Resp: 20   Temp: 35.9 °C (96.6 °F)   SpO2: 100%       Patient is awake and alert, sitting calmly and watching TV  NAD  Neck supple  Heart S1S2  Chest: Equal expansion, bilaterally clear to auscultation  Abdomen: soft, ND, NTTP, no guarding  Extrem: Left upper extremity erythematous with swelling particularly on the fifth digit, edema in the forearm seems to be coming down although still erythematous.  He is able to move all of his remaining fingers and to flex and extend the wrist despite the wound  Skin: no rashes, no diaphoresis  Neuro: CNS intact  Affect appropriate and patient is interactive    Admission on 04/01/2025   Component Date Value Ref Range Status    WBC 04/01/2025 8.3  4.4 - 11.3 x10*3/uL Final    nRBC 04/01/2025 0.0  0.0 - 0.0 /100 WBCs Final    RBC 04/01/2025 3.66 (L)  4.50 - 5.90  x10*6/uL Final    Hemoglobin 04/01/2025 11.7 (L)  13.5 - 17.5 g/dL Final    Hematocrit 04/01/2025 36.7 (L)  41.0 - 52.0 % Final    MCV 04/01/2025 100  80 - 100 fL Final    MCH 04/01/2025 32.0  26.0 - 34.0 pg Final    MCHC 04/01/2025 31.9 (L)  32.0 - 36.0 g/dL Final    RDW 04/01/2025 12.0  11.5 - 14.5 % Final    Platelets 04/01/2025 191  150 - 450 x10*3/uL Final    Neutrophils % 04/01/2025 80.1  40.0 - 80.0 % Final    Immature Granulocytes %, Automated 04/01/2025 0.4  0.0 - 0.9 % Final    Immature Granulocyte Count (IG) includes promyelocytes, myelocytes and metamyelocytes but does not include bands. Percent differential counts (%) should be interpreted in the context of the absolute cell counts (cells/UL).    Lymphocytes % 04/01/2025 5.3  13.0 - 44.0 % Final    Monocytes % 04/01/2025 11.7  2.0 - 10.0 % Final    Eosinophils % 04/01/2025 2.3  0.0 - 6.0 % Final    Basophils % 04/01/2025 0.2  0.0 - 2.0 % Final    Neutrophils Absolute 04/01/2025 6.63 (H)  1.60 - 5.50 x10*3/uL Final    Percent differential counts (%) should be interpreted in the context of the absolute cell counts (cells/uL).    Immature Granulocytes Absolute, Au* 04/01/2025 0.03  0.00 - 0.50 x10*3/uL Final    Lymphocytes Absolute 04/01/2025 0.44 (L)  0.80 - 3.00 x10*3/uL Final    Monocytes Absolute 04/01/2025 0.97 (H)  0.05 - 0.80 x10*3/uL Final    Eosinophils Absolute 04/01/2025 0.19  0.00 - 0.40 x10*3/uL Final    Basophils Absolute 04/01/2025 0.02  0.00 - 0.10 x10*3/uL Final    Glucose 04/01/2025 83  74 - 99 mg/dL Final    Sodium 04/01/2025 135 (L)  136 - 145 mmol/L Final    Potassium 04/01/2025 4.1  3.5 - 5.3 mmol/L Final    Chloride 04/01/2025 89 (L)  98 - 107 mmol/L Final    Bicarbonate 04/01/2025 38 (H)  21 - 32 mmol/L Final    Anion Gap 04/01/2025 12  10 - 20 mmol/L Final    Urea Nitrogen 04/01/2025 25 (H)  6 - 23 mg/dL Final    Creatinine 04/01/2025 0.94  0.50 - 1.30 mg/dL Final    eGFR 04/01/2025 82  >60 mL/min/1.73m*2 Final    Calculations of  estimated GFR are performed using the 2021 CKD-EPI Study Refit equation without the race variable for the IDMS-Traceable creatinine methods.  https://jasn.asnjournals.org/content/early/2021/09/22/ASN.3564846094    Calcium 04/01/2025 9.0  8.6 - 10.3 mg/dL Final    Albumin 04/01/2025 3.8  3.4 - 5.0 g/dL Final    Alkaline Phosphatase 04/01/2025 37  33 - 136 U/L Final    Total Protein 04/01/2025 7.6  6.4 - 8.2 g/dL Final    AST 04/01/2025 31  9 - 39 U/L Final    Bilirubin, Total 04/01/2025 0.5  0.0 - 1.2 mg/dL Final    ALT 04/01/2025 30  10 - 52 U/L Final    Patients treated with Sulfasalazine may generate falsely decreased results for ALT.    Magnesium 04/01/2025 1.87  1.60 - 2.40 mg/dL Final    Ventricular Rate 04/01/2025 66  BPM Preliminary    Atrial Rate 04/01/2025 66  BPM Preliminary    VA Interval 04/01/2025 158  ms Preliminary    QRS Duration 04/01/2025 126  ms Preliminary    QT Interval 04/01/2025 428  ms Preliminary    QTC Calculation(Bazett) 04/01/2025 448  ms Preliminary    P Axis 04/01/2025 75  degrees Preliminary    R Axis 04/01/2025 48  degrees Preliminary    T Axis 04/01/2025 33  degrees Preliminary    QRS Count 04/01/2025 11  beats Preliminary    Q Onset 04/01/2025 224  ms Preliminary    P Onset 04/01/2025 145  ms Preliminary    P Offset 04/01/2025 186  ms Preliminary    T Offset 04/01/2025 438  ms Preliminary    QTC Fredericia 04/01/2025 442  ms Preliminary    Troponin I, High Sensitivity 04/01/2025 8  0 - 20 ng/L Final    Lactate 04/01/2025 1.1  0.4 - 2.0 mmol/L Final    Protime 04/01/2025 13.2 (H)  9.8 - 12.4 seconds Final    INR 04/01/2025 1.2 (H)  0.9 - 1.1 Final    BNP 04/01/2025 115 (H)  0 - 99 pg/mL Final    Alcohol 04/01/2025 <10  <=10 mg/dL Final    For medical use only.    Color, Urine 04/01/2025 Light-Yellow  Light-Yellow, Yellow, Dark-Yellow Final    Appearance, Urine 04/01/2025 Clear  Clear Final    Specific Gravity, Urine 04/01/2025 1.016  1.005 - 1.035 Final    pH, Urine 04/01/2025 5.5   5.0, 5.5, 6.0, 6.5, 7.0, 7.5, 8.0 Final    Protein, Urine 04/01/2025 20 (TRACE)  NEGATIVE, 10 (TRACE), 20 (TRACE) mg/dL Final    Glucose, Urine 04/01/2025 Normal  Normal mg/dL Final    Blood, Urine 04/01/2025 NEGATIVE  NEGATIVE mg/dL Final    Ketones, Urine 04/01/2025 40 (2+) (A)  NEGATIVE mg/dL Final    Bilirubin, Urine 04/01/2025 NEGATIVE  NEGATIVE mg/dL Final    Urobilinogen, Urine 04/01/2025 Normal  Normal mg/dL Final    Nitrite, Urine 04/01/2025 NEGATIVE  NEGATIVE Final    Leukocyte Esterase, Urine 04/01/2025 NEGATIVE  NEGATIVE Final    POCT pH, Arterial 04/01/2025 7.36 (L)  7.38 - 7.42 pH Final    POCT pCO2, Arterial 04/01/2025 66 (H)  38 - 42 mm Hg Final    POCT pO2, Arterial 04/01/2025 75 (L)  85 - 95 mm Hg Final    POCT SO2, Arterial 04/01/2025 97  94 - 100 % Final    POCT Oxy Hemoglobin, Arterial 04/01/2025 94.1  94.0 - 98.0 % Final    POCT Hematocrit Calculated, Arteri* 04/01/2025 35.0 (L)  41.0 - 52.0 % Final    POCT Sodium, Arterial 04/01/2025 133 (L)  136 - 145 mmol/L Final    POCT Potassium, Arterial 04/01/2025 4.1  3.5 - 5.3 mmol/L Final    POCT Chloride, Arterial 04/01/2025 93 (L)  98 - 107 mmol/L Final    POCT Ionized Calcium, Arterial 04/01/2025 1.14  1.10 - 1.33 mmol/L Final    POCT Glucose, Arterial 04/01/2025 84  74 - 99 mg/dL Final    POCT Lactate, Arterial 04/01/2025 0.8  0.4 - 2.0 mmol/L Final    POCT Base Excess, Arterial 04/01/2025 9.6 (H)  -2.0 - 3.0 mmol/L Final    POCT HCO3 Calculated, Arterial 04/01/2025 37.3 (H)  22.0 - 26.0 mmol/L Final    POCT Hemoglobin, Arterial 04/01/2025 11.7 (L)  13.5 - 17.5 g/dL Final    POCT Anion Gap, Arterial 04/01/2025 7 (L)  10 - 25 mmo/L Final    Patient Temperature 04/01/2025    Final    NOTE: Patient Results are Not Corrected for Temperature    FiO2 04/01/2025 6  % Final    Apparatus 04/01/2025 CANNULA   Final    Site of Arterial Puncture 04/01/2025 Radial Right   Final    Azam's Test 04/01/2025 Positive   Final    Site of Arterial Puncture 04/01/2025  R RAD   Final    Azam's Test 04/01/2025 POSITIVE   Final    WBC, Urine 04/01/2025 NONE  1-5, NONE /HPF Final    RBC, Urine 04/01/2025 NONE  NONE, 1-2, 3-5 /HPF Final    Mucus, Urine 04/01/2025 FEW  Reference range not established. /LPF Final    Hyaline Casts, Urine 04/01/2025 2+ (A)  NONE /LPF Final    Fine Granular Casts, Urine 04/01/2025 OCCASIONAL (A)  NONE /LPF Final    Uric Acid 04/01/2025 10.4 (H)  4.0 - 7.5 mg/dL Final    N-acetyl-p-benzoquinone imine (metabolite of Acetaminophen)will generate erroneously low results in samples for patients that have taken toxic doses of acetaminophen.  Venipuncture immediately after or during the administration of Metamizole may lead to falsely low results. Testing should be performed immediately  prior to Metamizole dosing.    Extra Tube 04/01/2025 Hold for add-ons.   Final    Auto resulted.       Assessment:   Left upper extremity cellulitis  Superficial vein thrombosis with partially occlusive basilic vein thrombosis noted on left upper extremity on vascular ultrasound  History of COPD, gout, essential tremor, alcohol dependence (2-3 beers daily)    Plan:   Empirically started on vancomycin and Zosyn Discontinue Zosyn.  Start cefazolin.  Continue vancomycin for now  Wound culture if possible  Blood cultures x 2 pending    Patient is on colchicine.  His gout flares happen in his great toe per patient    All communication directed to consulting provider.   Thank you very much for this consultation.     Time spent before, during, and after this consult reviewed data and coordinating care on the date of this consultation, including face to face visit with the patient > 60 min

## 2025-04-02 NOTE — PROGRESS NOTES
ASSESSMENT & PLAN:     LUE Cellulitis  -Given extent of edema/erythema will obtain CT of LUE for further characterization. CT remains pending, will follow once obtained.   -ID consulted for eval, recs appreciated. ATB de-escalated to vancomycin/cefazolin.  -Cont Vanc/cefazolin.   -Follow BCx      Superficial Vein Thrombosis   -Partially occlusive Basilic Vein thrombosis noted on LUE vascular US.   -Will manage with warm compresses/ibuprofen for now     Hx Gout  -Continue empiric coverage for flare. Uric acid level elevated, 10.4.   -Cont colchicine 0.6 mg BID      Essential Tremor  -On primidone 100 mg BID, will continue     ETOH Dependence  -Drinks 2-3 beers daily. Denies hx of withdrawal  -Will monitor on CIWA  -Thiamine/folate/MV     Severe COPD  -On 6L O2 at baseline.   -Scheduled/PRN nebs. As not in exacerbation will defer steroid administration     Ambulatory dysfunction  -PT/OT eval appreciated     VTE Prophylaxis: Lovenox SubQ      Dre Vergara MD    SUBJECTIVE     Patient had no acute events overnight. Feels the same. Denies any fever or chills. Has not appreciated a reduction in hand swelling/discoloration. Further ROS was unermarkable.       OBJECTIVE:       Last Recorded Vitals:  Vitals:    04/02/25 0426 04/02/25 0731 04/02/25 0752 04/02/25 0758   BP: 100/54   133/69   Patient Position:    Sitting   Pulse: 67      Resp: 18   17   Temp: 36.1 °C (97 °F)   36.2 °C (97.2 °F)   TempSrc: Temporal      SpO2: 96% (!) 88% (!) 88% 100%   Weight:       Height:           Last I/O:  I/O last 3 completed shifts:  In: 550 (7.9 mL/kg) [IV Piggyback:550]  Out: - (0 mL/kg)   Weight: 69.2 kg     Physical Exam:  General: Ill-appearing elderly male in mild distress  HEENT: Clear sclera, EOMI, trachea midline, moist mucous membranes  Respiratory: Equal chest rise, no retractions  Abdomen: Soft, nontender, nondistended  Extremities: LUE erythema and swelling from phalanges to one third forearm.  Forearm wound covered in clean  bandaging.  Neurological: Spontaneously moves all extremities, no dysarthria, cranial nerves grossly intact  Psychiatric: Appropriate mood and affect  Skin: Warm, dry    Inpatient Medications:  ceFAZolin, 1 g, intravenous, q8h  colchicine, 0.6 mg, oral, BID  enoxaparin, 40 mg, subcutaneous, q24h  folic acid, 1 mg, oral, Daily  ibuprofen, 600 mg, oral, BID  ipratropium-albuteroL, 3 mL, nebulization, q6h  multivitamin with minerals, 1 tablet, oral, Daily  polyethylene glycol, 17 g, oral, Daily  potassium chloride, 20 mEq, intravenous, Once  primidone, 100 mg/day, oral, BID  sennosides, 2 tablet, oral, Nightly  [START ON 4/4/2025] thiamine, 100 mg, oral, Daily  vancomycin, 1,000 mg, intravenous, q24h        PRN Medications  PRN medications: HYDROmorphone, HYDROmorphone, ipratropium-albuteroL, LORazepam **OR** LORazepam **OR** LORazepam, vancomycin    Continuous Medications:  oxygen, , Last Rate: 6 L/min (04/02/25 0752)          LABS AND IMAGING:     Labs:  Results for orders placed or performed during the hospital encounter of 04/01/25 (from the past 24 hours)   CBC and Auto Differential   Result Value Ref Range    WBC 8.3 4.4 - 11.3 x10*3/uL    nRBC 0.0 0.0 - 0.0 /100 WBCs    RBC 3.66 (L) 4.50 - 5.90 x10*6/uL    Hemoglobin 11.7 (L) 13.5 - 17.5 g/dL    Hematocrit 36.7 (L) 41.0 - 52.0 %     80 - 100 fL    MCH 32.0 26.0 - 34.0 pg    MCHC 31.9 (L) 32.0 - 36.0 g/dL    RDW 12.0 11.5 - 14.5 %    Platelets 191 150 - 450 x10*3/uL    Neutrophils % 80.1 40.0 - 80.0 %    Immature Granulocytes %, Automated 0.4 0.0 - 0.9 %    Lymphocytes % 5.3 13.0 - 44.0 %    Monocytes % 11.7 2.0 - 10.0 %    Eosinophils % 2.3 0.0 - 6.0 %    Basophils % 0.2 0.0 - 2.0 %    Neutrophils Absolute 6.63 (H) 1.60 - 5.50 x10*3/uL    Immature Granulocytes Absolute, Automated 0.03 0.00 - 0.50 x10*3/uL    Lymphocytes Absolute 0.44 (L) 0.80 - 3.00 x10*3/uL    Monocytes Absolute 0.97 (H) 0.05 - 0.80 x10*3/uL    Eosinophils Absolute 0.19 0.00 - 0.40  x10*3/uL    Basophils Absolute 0.02 0.00 - 0.10 x10*3/uL   Comprehensive Metabolic Panel   Result Value Ref Range    Glucose 83 74 - 99 mg/dL    Sodium 135 (L) 136 - 145 mmol/L    Potassium 4.1 3.5 - 5.3 mmol/L    Chloride 89 (L) 98 - 107 mmol/L    Bicarbonate 38 (H) 21 - 32 mmol/L    Anion Gap 12 10 - 20 mmol/L    Urea Nitrogen 25 (H) 6 - 23 mg/dL    Creatinine 0.94 0.50 - 1.30 mg/dL    eGFR 82 >60 mL/min/1.73m*2    Calcium 9.0 8.6 - 10.3 mg/dL    Albumin 3.8 3.4 - 5.0 g/dL    Alkaline Phosphatase 37 33 - 136 U/L    Total Protein 7.6 6.4 - 8.2 g/dL    AST 31 9 - 39 U/L    Bilirubin, Total 0.5 0.0 - 1.2 mg/dL    ALT 30 10 - 52 U/L   Magnesium   Result Value Ref Range    Magnesium 1.87 1.60 - 2.40 mg/dL   Troponin I, High Sensitivity   Result Value Ref Range    Troponin I, High Sensitivity 8 0 - 20 ng/L   Lactate   Result Value Ref Range    Lactate 1.1 0.4 - 2.0 mmol/L   Protime-INR   Result Value Ref Range    Protime 13.2 (H) 9.8 - 12.4 seconds    INR 1.2 (H) 0.9 - 1.1   B-Type Natriuretic Peptide   Result Value Ref Range     (H) 0 - 99 pg/mL   Ethanol   Result Value Ref Range    Alcohol <10 <=10 mg/dL   Uric Acid   Result Value Ref Range    Uric Acid 10.4 (H) 4.0 - 7.5 mg/dL   ECG 12 lead   Result Value Ref Range    Ventricular Rate 66 BPM    Atrial Rate 66 BPM    SC Interval 158 ms    QRS Duration 126 ms    QT Interval 428 ms    QTC Calculation(Bazett) 448 ms    P Axis 75 degrees    R Axis 48 degrees    T Axis 33 degrees    QRS Count 11 beats    Q Onset 224 ms    P Onset 145 ms    P Offset 186 ms    T Offset 438 ms    QTC Fredericia 442 ms   Urinalysis with Reflex Culture and Microscopic   Result Value Ref Range    Color, Urine Light-Yellow Light-Yellow, Yellow, Dark-Yellow    Appearance, Urine Clear Clear    Specific Gravity, Urine 1.016 1.005 - 1.035    pH, Urine 5.5 5.0, 5.5, 6.0, 6.5, 7.0, 7.5, 8.0    Protein, Urine 20 (TRACE) NEGATIVE, 10 (TRACE), 20 (TRACE) mg/dL    Glucose, Urine Normal Normal mg/dL     Blood, Urine NEGATIVE NEGATIVE mg/dL    Ketones, Urine 40 (2+) (A) NEGATIVE mg/dL    Bilirubin, Urine NEGATIVE NEGATIVE mg/dL    Urobilinogen, Urine Normal Normal mg/dL    Nitrite, Urine NEGATIVE NEGATIVE    Leukocyte Esterase, Urine NEGATIVE NEGATIVE   Extra Urine Gray Tube   Result Value Ref Range    Extra Tube Hold for add-ons.    Urinalysis Microscopic   Result Value Ref Range    WBC, Urine NONE 1-5, NONE /HPF    RBC, Urine NONE NONE, 1-2, 3-5 /HPF    Mucus, Urine FEW Reference range not established. /LPF    Hyaline Casts, Urine 2+ (A) NONE /LPF    Fine Granular Casts, Urine OCCASIONAL (A) NONE /LPF   BLOOD GAS ARTERIAL FULL PANEL   Result Value Ref Range    POCT pH, Arterial 7.36 (L) 7.38 - 7.42 pH    POCT pCO2, Arterial 66 (H) 38 - 42 mm Hg    POCT pO2, Arterial 75 (L) 85 - 95 mm Hg    POCT SO2, Arterial 97 94 - 100 %    POCT Oxy Hemoglobin, Arterial 94.1 94.0 - 98.0 %    POCT Hematocrit Calculated, Arterial 35.0 (L) 41.0 - 52.0 %    POCT Sodium, Arterial 133 (L) 136 - 145 mmol/L    POCT Potassium, Arterial 4.1 3.5 - 5.3 mmol/L    POCT Chloride, Arterial 93 (L) 98 - 107 mmol/L    POCT Ionized Calcium, Arterial 1.14 1.10 - 1.33 mmol/L    POCT Glucose, Arterial 84 74 - 99 mg/dL    POCT Lactate, Arterial 0.8 0.4 - 2.0 mmol/L    POCT Base Excess, Arterial 9.6 (H) -2.0 - 3.0 mmol/L    POCT HCO3 Calculated, Arterial 37.3 (H) 22.0 - 26.0 mmol/L    POCT Hemoglobin, Arterial 11.7 (L) 13.5 - 17.5 g/dL    POCT Anion Gap, Arterial 7 (L) 10 - 25 mmo/L    Patient Temperature      FiO2 6 %    Apparatus CANNULA     Site of Arterial Puncture Radial Right     Azam's Test Positive     Site of Arterial Puncture R RAD     Azam's Test POSITIVE    Blood Culture    Specimen: Peripheral Venipuncture; Blood culture   Result Value Ref Range    Blood Culture Loaded on Instrument - Culture in progress    Blood Culture    Specimen: Peripheral Venipuncture; Blood culture   Result Value Ref Range    Blood Culture Loaded on Instrument -  Culture in progress    SST TOP   Result Value Ref Range    Extra Tube Hold for add-ons.    Vancomycin   Result Value Ref Range    Vancomycin 23.6 (H) 5.0 - 20.0 ug/mL   Magnesium   Result Value Ref Range    Magnesium 1.72 1.60 - 2.40 mg/dL   Basic Metabolic Panel   Result Value Ref Range    Glucose 113 (H) 74 - 99 mg/dL    Sodium 135 (L) 136 - 145 mmol/L    Potassium 3.4 (L) 3.5 - 5.3 mmol/L    Chloride 90 (L) 98 - 107 mmol/L    Bicarbonate 37 (H) 21 - 32 mmol/L    Anion Gap 11 10 - 20 mmol/L    Urea Nitrogen 24 (H) 6 - 23 mg/dL    Creatinine 1.14 0.50 - 1.30 mg/dL    eGFR 65 >60 mL/min/1.73m*2    Calcium 8.5 (L) 8.6 - 10.3 mg/dL   CBC and Auto Differential   Result Value Ref Range    WBC 6.9 4.4 - 11.3 x10*3/uL    nRBC 0.0 0.0 - 0.0 /100 WBCs    RBC 3.39 (L) 4.50 - 5.90 x10*6/uL    Hemoglobin 10.7 (L) 13.5 - 17.5 g/dL    Hematocrit 33.3 (L) 41.0 - 52.0 %    MCV 98 80 - 100 fL    MCH 31.6 26.0 - 34.0 pg    MCHC 32.1 32.0 - 36.0 g/dL    RDW 11.9 11.5 - 14.5 %    Platelets 188 150 - 450 x10*3/uL    Neutrophils % 71.8 40.0 - 80.0 %    Immature Granulocytes %, Automated 0.3 0.0 - 0.9 %    Lymphocytes % 12.1 13.0 - 44.0 %    Monocytes % 12.3 2.0 - 10.0 %    Eosinophils % 3.2 0.0 - 6.0 %    Basophils % 0.3 0.0 - 2.0 %    Neutrophils Absolute 4.97 1.60 - 5.50 x10*3/uL    Immature Granulocytes Absolute, Automated 0.02 0.00 - 0.50 x10*3/uL    Lymphocytes Absolute 0.84 0.80 - 3.00 x10*3/uL    Monocytes Absolute 0.85 (H) 0.05 - 0.80 x10*3/uL    Eosinophils Absolute 0.22 0.00 - 0.40 x10*3/uL    Basophils Absolute 0.02 0.00 - 0.10 x10*3/uL   Vancomycin   Result Value Ref Range    Vancomycin 17.6 5.0 - 20.0 ug/mL   SST TOP   Result Value Ref Range    Extra Tube Hold for add-ons.         Imaging:  Vascular US upper extremity venous duplex left  Narrative: Interpreted By:  Ros Juan,   STUDY:  [facility] US UPPER EXTREMITY VENOUS DUPLEX LEFT;  4/1/2025 3:36 pm      INDICATION:  Signs/Symptoms:left hand swelling.           COMPARISON:  None.      ACCESSION NUMBER(S):  [id number]      ORDERING CLINICIAN:  EMANUEL RIVERA      TECHNIQUE:  Vascular ultrasound of the left upper extremity was performed.  Evaluation was performed with grayscale, color, and spectral Doppler.  When possible, compression views of the evaluated veins was also  performed.      FINDINGS:  LEFT UPPER EXTREMITY:  Evaluation of the visualized portions of the left internal jugular,  innominate, subclavian, axillary, brachial, cephalic, and basilic  veins was performed.      A partially occlusive thrombus is seen in the left basilic vein. The  remaining evaluated veins are patent.      Impression: Partially occlusive left basilic vein thrombus. The remaining  evaluated veins are patent.      MACRO:  Critical Finding:  See findings. Notification was initiated on  4/1/2025 at 3:44 pm by  Ros Juan.  (**-OCF-**) Instructions:      Signed by: Ros Juan 4/1/2025 3:45 PM  Dictation workstation:   [id number]  ECG 12 lead  Normal sinus rhythm  Right bundle branch block  Abnormal ECG  When compared with ECG of 03-JUL-2022 21:24,  DE interval has decreased  CT head wo IV contrast  Narrative: Interpreted By:  Brody Krishnamurthy,   STUDY:  CT HEAD WO IV CONTRAST;  4/1/2025 2:42 pm      INDICATION:  Signs/Symptoms:weakness.      COMPARISON:  None.      ACCESSION NUMBER(S):  [id number]      ORDERING CLINICIAN:  EMANUEL RIVERA      TECHNIQUE:  Routine axial images were obtained from the skull base through the  vertex.  Sagittal and coronal reconstruction images were generated.  Brain, subdural, and bone windows were reviewed. N/A   N/A      FINDINGS:  INTRACRANIAL:  Mild prominence of ventricles and sulci. There is mild patchy  hypodensity throughout the deep periventricular white matter. No  acute intracranial bleed, midline shift, or focal mass effect. No  destructive bone lesion. No depressed skull fracture. Skullbase  arterial calcifications in the carotid siphons and  vertebral arteries.      EXTRACRANIAL:  Mild mucosal thickening along the floor of each maxillary sinus.  Small retention cyst in the right frontal sinus. Otherwise,  visualized paranasal sinuses were clear. Visualized mastoid air cells  were clear.      Impression: No acute intracranial bleed or focal mass effect.      Mild volume loss.      Mild paranasal sinusitis without fluid levels..      MACRO:  None      Signed by: Brody Krishnamurthy 4/1/2025 2:53 PM  Dictation workstation:   [id number]  XR chest 1 view  Narrative: Interpreted By:  Brody Krishnamurthy,   STUDY:  XR CHEST 1 VIEW;  4/1/2025 2:30 pm      INDICATION:  Signs/Symptoms:peripheral edema.      COMPARISON:  Chest x-ray from 07/03/2022. CT scan chest from 09/14/2023.      ACCESSION NUMBER(S):  [id number]      ORDERING CLINICIAN:  EMANUEL RIVERA      TECHNIQUE:  Single AP portable view of the chest was obtained.      FINDINGS:  MEDIASTINUM/ LUNGS/ NIKKO:  Mild cardiomegaly, currently without vascular congestion, or pleural  effusion. There are mild persistent infiltrative opacities at the  medial lung bases, left greater than right. No masslike opacity in  either lung. Calcification present in the aorta. No pneumothorax.  No tracheal deviation.  No abnormal hilar fullness or gross mass on either side.      BONES:  No lytic or blastic destructive bone lesion.  There is  mild-to-moderate disc space narrowing and endplate osteophytosis  throughout the thoracic spine.      UPPER ABDOMEN:  Grossly intact.      Impression: Medial bibasilar infiltrative opacities, left greater than right,  similar to prior CT scan. These could be chronic scarring or  recurrent pneumonia. Clinical correlation needed.      Thoracic spine DJD as described.      Mild cardiomegaly.  Currently without radiographic evidence of CHF.      MACRO:  None      Signed by: Brody Krishnamurthy 4/1/2025 2:50 PM  Dictation workstation:   [id number]  XR hand left 3+ views, XR wrist left 3+ views  Narrative:  Interpreted By:  Brody Krishnamurthy,   STUDY:  XR HAND LEFT 3+ VIEWS; XR WRIST LEFT 3+ VIEWS;  4/1/2025 2:29 pm;  4/1/2025 2:30 pm      INDICATION:  Signs/Symptoms:wound and swelling; Signs/Symptoms:wound swelling.      COMPARISON:  None.      ACCESSION NUMBER(S):  [id number]; [id number]      ORDERING CLINICIAN:  EMANUEL RIVERA      TECHNIQUE:  Three views each of the left hand and left wrist were obtained.      FINDINGS:  Previous amputation of the 2nd digit across the proximal end of the  middle phalanx. Previous amputation of the 3rd digit distal to the  proximal phalanx. There is diffuse soft tissue swelling throughout  the left hand and wrist, and also throughout the 5th finger. There is  mild erosive arthritic change along the proximal articular surface of  the lunate. There is mild joint space loss with spurring in the 5th  PIP joint and there is a mild flexion deformity at that joint. Slight  spurring in the 1st IP joint. No soft tissue gas density. No  significant osteophytic change. No lytic or blastic destructive bone  lesion. No acute fracture or dislocation.  No opaque soft tissue foreign body.  No periosteal reaction or erosion.      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      Nonspecific soft tissue swelling in the left hand and wrist and also  the left 5th finger.      Slight flexion deformity at the 5th PIP joint.      Mild arthritic changes as described.      No soft tissue gas density or opaque foreign body. No destructive  bone lesion or acute fracture.      MACRO:  None      Signed by: Brody Krishnamurthy 4/1/2025 2:48 PM  Dictation workstation:   [id number]

## 2025-04-02 NOTE — CARE PLAN
The patient's goals for the shift include  wean back down O2 to baseline 6L    The clinical goals for the shift include Patient will be weaned off of venti mask by end of shift    Over the shift, the patient made progress toward the following goals..      Problem: Pain - Adult  Goal: Verbalizes/displays adequate comfort level or baseline comfort level  Outcome: Progressing     Problem: Safety - Adult  Goal: Free from fall injury  Outcome: Progressing     Problem: Discharge Planning  Goal: Discharge to home or other facility with appropriate resources  Outcome: Progressing     Problem: Chronic Conditions and Co-morbidities  Goal: Patient's chronic conditions and co-morbidity symptoms are monitored and maintained or improved  Outcome: Progressing     Problem: Nutrition  Goal: Nutrient intake appropriate for maintaining nutritional needs  Outcome: Progressing     Problem: Respiratory  Goal: No signs of respiratory distress (eg. Use of accessory muscles. Peds grunting)  Outcome: Progressing  Goal: Wean oxygen to maintain O2 saturation per order/standard this shift  Outcome: Progressing     Problem: Infection related to problem list condition  Goal: Infection will resolve through treatment  Outcome: Progressing

## 2025-04-02 NOTE — PROGRESS NOTES
Physical Therapy    Physical Therapy    Physical Therapy Evaluation    Patient Name: Rodrigo Conte  MRN: [id number]  Today's Date: 4/2/2025   Time Calculation  Start Time: 0730  Stop Time: 0755  Time Calculation (min): 25 min  623/623-A    Assessment/Plan   PT Assessment  PT Assessment Results: Decreased strength, Decreased mobility, Impaired balance, Decreased endurance, Decreased cognition, Decreased safety awareness  Rehab Prognosis: Good  Barriers to Discharge Home:  (Alone while daughter works. Requiring increased assistance for mobilty.)  Evaluation/Treatment Tolerance: Patient limited by fatigue, Patient limited by pain  End of Session Communication:  (RT)  Assessment Comment: Patient will benefit from additional PT to increase strength, mobilty and activity tolerance.  End of Session Patient Position: Up in chair, Alarm on  IP OR SWING BED PT PLAN  Inpatient or Swing Bed: Inpatient  PT Plan  Treatment/Interventions: Bed mobility, Transfer training, Gait training, Balance training, Therapeutic exercise, Therapeutic activity  PT Plan: Ongoing PT  PT Frequency: 3 times per week  PT Discharge Recommendations:  (Continued PT at moderate intensity and moderate frequency with 24/7 care)  PT Recommended Transfer Status: Assist x1  PT - OK to Discharge: Yes (once deemed medically appropriate with continued PT and 24/7 care)    Subjective     General Visit Information:  General  Reason for Referral: Impaired mobility  Referred By: PT/OT 4/1/25 Urs[place]  Past Medical History Relevant to Rehab: COPD, dyslipidemia, HTN, 6Lo2 home, L IF and MF amp from saw accident 40 yrs ago. ETOH, covid 19, tremors, OA R foot  Co-Treatment: OT  Co-Treatment Reason: to maximize pt/staff safety  Patient Position Received: Bed, 3 rail up, Alarm off, not on at start of session  General Comment: To ED 4/1/25 with LE, left hand and forearm edema. Cellullitis LUE, Basilic vein thrombosis    Home Living:  Home Living  Home Living Comments: Per patient  report resides with daughter (works) in 1 story home 3 steps with handrail on both sides. Tub shower with seat no grab bar. Owns SPC and ww.    Prior Level of Function:  Prior Function Per Pt/Caregiver Report  Prior Function Comments: Per patient report inidependent in mobility, and ADLs with SPC. Daughter assists with IADLs and medication management Reports 1 fall over past 3 months. States he was able to get himself up. Does not drive.    Precautions:  Precautions  Medical Precautions: Fall precautions, Oxygen therapy device and L/min    Vital Signs:  Post activity 88% Respiratory present to do breathing treatment      Objective     Pain:  Pain Assessment  Pain Assessment: 0-10  0-10 (Numeric) Pain Score: 5 - Moderate pain  Pain Type: Acute pain  Pain Location: Hand  Pain Orientation: Left    Cognition:  Cognition  Overall Cognitive Status:  (Flat)  Arousal/Alertness: Delayed responses to stimuli  Following Commands:  (Follow simple commands > 75% of time)  Processing Speed: Delayed    General Assessments:  General Observation  General Observation: Patient lying in bed. Easily roused. Agreeable to PT/OT. Male external cathete rin place. Oxygen at 6 liters. Note red, swollen LUE with errythema. BLE dry, flaky skin with wrinkles   Activity Tolerance  Endurance:  (Poor)                 Static Sitting Balance Good  Dynamic Sitting BalanceFair  Static Standing Balance: Fair -  Dynamic Standing Balance: Poor    Functional Assessments:     Bed Mobility  Bed Mobility:  (Supine > sit + 1 moderate assist, Able to assist in moving LEs toward EOB, but requires assist to lift trunk up from mattress.)  Transfers  Transfer:  (Sit > stand at bed level (high) + 1 moderate assist feet blocked. Provided SPC. Patient tends to use both hands on SPC for support .Sit <> stand at BSC + 1 moderate assist Adopts WBOS and forward flexed posture. Stand> sit at recliner + 1moderate assist with vc to reach back.   Ambulation/Gait  "Training  Ambulation/Gait Training Performed:  (Patient ambulated with SPC + 1 moderate assist. 3' Patient adopts WBOS with forward flexed posture. Tends to try to use cane with both hands. Will benefit from support of ww as long as CT of LUE does not demonstrate issues to limit weight bearing.)          Extremity/Trunk Assessments:  RUE   RUE : Within Functional Limits  LUE   LUE:  (Left shoulder/elbow WFL, wrist/fingers limited by edema)  RLE   RLE :  (AROM Hip/knee/ankle WFL MMT 4/5 grossly. Note wrinkled, dry, appearance lower leg into feet.)  LLE   LLE :  (AROM Hip/knee/ankle WFL MMT 4/5 grossly. Note wrinkled, dry, appearance lower leg into feet.)    Outcome Measures:     [facility] Basic Mobility  Turning from your back to your side while in a flat bed without using bedrails: A little  Moving from lying on your back to sitting on the side of a flat bed without using bedrails: A lot  Moving to and from bed to chair (including a wheelchair): A lot  Standing up from a chair using your arms (e.g. wheelchair or bedside chair): A lot  To walk in hospital room: Total  Climbing 3-5 steps with railing: Total  Basic Mobility - Total Score: 11                          Goals:  Encounter Problems       Encounter Problems (Active)       PT Problem       Bed mobility supine <> sit modified independent  (Progressing)       Start:  04/02/25    Expected End:  04/16/25            Transfers sit <> stand/bed<> chair with ww SBA (Progressing)       Start:  04/02/25    Expected End:  04/16/25            Patient ambulated with ww 30' SBA (Progressing)       Start:  04/02/25    Expected End:  04/16/25            Patient to demonstrate multilevel reach with single  UE support > 2\" out of ANNE in stand (Not Progressing)       Start:  04/02/25    Expected End:  04/16/25                 Education Documentation  Mobility Training, taught by Jessica Farley, PT at 4/2/2025 12:13 PM.  Learner: Patient  Readiness: Acceptance  Method: Explanation, " Demonstration  Response: Needs Reinforcement  Comment: see note

## 2025-04-02 NOTE — PROGRESS NOTES
Social Work Note Per [facility], pt states daily ETOH.  Contacted JUSTICE Sommer and requested that she follow up with pt about treatment resource options.  LEANN Hess

## 2025-04-02 NOTE — CONSULTS
Vancomycin Dosing by Pharmacy- INITIAL    Rodrigo Conte is a 79 y.o. year old male who Pharmacy has been consulted for vancomycin dosing for cellulitis, skin and soft tissue. Based on the patient's indication and renal status this patient will be dosed based on a goal AUC of 400-600.     Renal function is currently stable.    Visit Vitals  /69 (BP Location: Left arm)   Pulse 61   Temp 35.9 °C (96.6 °F) (Temporal)   Resp 20        Lab Results   Component Value Date    CREATININE 0.94 2025    CREATININE 1.05 2023    CREATININE 1.12 2023    CREATININE 0.69 2022    CREATININE 0.65 2022        Patient weight is as follows:   Vitals:    25   Weight: 69.2 kg (152 lb 8.9 oz)       Cultures:  No results found for the encounter in last 14 days.        No intake/output data recorded.  I/O during current shift:  I/O this shift:  In: 500 [IV Piggyback:500]  Out: -     Temp (24hrs), Av.2 °C (97.1 °F), Min:35.9 °C (96.6 °F), Max:36.3 °C (97.3 °F)         Assessment/Plan     Patient has already been given a loading dose of 2000 mg.  Will initiate vancomycin maintenance,  1250 mg every 24 hours.    This dosing regimen is predicted by [employer] to result in the following pharmacokinetic parameters:  Loading dose: N/A  Regimen: 1250 mg IV every 24 hours.  Start time: 18:00 on 2025  Exposure target: AUC24 (range)400-600 mg/L.hr   [id number]: 476 mg/L.hr  AUC24,ss: 475 mg/L.hr  Probability of AUC24 > 400: 69 %  Ctrough,ss: 13.5 mg/L  Probability of Ctrough,ss > 20: 20 %      Follow-up level will be ordered on 25 at 0000 and 0500 unless clinically indicated sooner.  Will continue to monitor renal function daily while on vancomycin and order serum creatinine at least every 48 hours if not already ordered.  Follow for continued vancomycin needs, clinical response, and signs/symptoms of toxicity.       Shelly Graf RP

## 2025-04-03 LAB
ANION GAP SERPL CALC-SCNC: 11 MMOL/L (ref 10–20)
ATRIAL RATE: 66 BPM
BASOPHILS # BLD AUTO: 0.02 X10*3/UL (ref 0–0.1)
BASOPHILS NFR BLD AUTO: 0.4 %
BUN SERPL-MCNC: 17 MG/DL (ref 6–23)
CALCIUM SERPL-MCNC: 8.2 MG/DL (ref 8.6–10.3)
CHLORIDE SERPL-SCNC: 95 MMOL/L (ref 98–107)
CO2 SERPL-SCNC: 34 MMOL/L (ref 21–32)
CREAT SERPL-MCNC: 0.87 MG/DL (ref 0.5–1.3)
EGFRCR SERPLBLD CKD-EPI 2021: 88 ML/MIN/1.73M*2
EOSINOPHIL # BLD AUTO: 0.18 X10*3/UL (ref 0–0.4)
EOSINOPHIL NFR BLD AUTO: 3.2 %
ERYTHROCYTE [DISTWIDTH] IN BLOOD BY AUTOMATED COUNT: 11.9 % (ref 11.5–14.5)
GLUCOSE SERPL-MCNC: 99 MG/DL (ref 74–99)
HCT VFR BLD AUTO: 31.5 % (ref 41–52)
HGB BLD-MCNC: 10.1 G/DL (ref 13.5–17.5)
IMM GRANULOCYTES # BLD AUTO: 0.02 X10*3/UL (ref 0–0.5)
IMM GRANULOCYTES NFR BLD AUTO: 0.4 % (ref 0–0.9)
LYMPHOCYTES # BLD AUTO: 0.79 X10*3/UL (ref 0.8–3)
LYMPHOCYTES NFR BLD AUTO: 14.2 %
MAGNESIUM SERPL-MCNC: 1.67 MG/DL (ref 1.6–2.4)
MCH RBC QN AUTO: 31.4 PG (ref 26–34)
MCHC RBC AUTO-ENTMCNC: 32.1 G/DL (ref 32–36)
MCV RBC AUTO: 98 FL (ref 80–100)
MONOCYTES # BLD AUTO: 0.66 X10*3/UL (ref 0.05–0.8)
MONOCYTES NFR BLD AUTO: 11.9 %
NEUTROPHILS # BLD AUTO: 3.89 X10*3/UL (ref 1.6–5.5)
NEUTROPHILS NFR BLD AUTO: 69.9 %
NRBC BLD-RTO: 0 /100 WBCS (ref 0–0)
P AXIS: 75 DEGREES
P OFFSET: 186 MS
P ONSET: 145 MS
PLATELET # BLD AUTO: 179 X10*3/UL (ref 150–450)
POTASSIUM SERPL-SCNC: 3.2 MMOL/L (ref 3.5–5.3)
PR INTERVAL: 158 MS
Q ONSET: 224 MS
QRS COUNT: 11 BEATS
QRS DURATION: 126 MS
QT INTERVAL: 428 MS
QTC CALCULATION(BAZETT): 448 MS
QTC FREDERICIA: 442 MS
R AXIS: 48 DEGREES
RBC # BLD AUTO: 3.22 X10*6/UL (ref 4.5–5.9)
SODIUM SERPL-SCNC: 137 MMOL/L (ref 136–145)
T AXIS: 33 DEGREES
T OFFSET: 438 MS
VENTRICULAR RATE: 66 BPM
WBC # BLD AUTO: 5.6 X10*3/UL (ref 4.4–11.3)

## 2025-04-03 PROCEDURE — 2500000002 HC RX 250 W HCPCS SELF ADMINISTERED DRUGS (ALT 637 FOR MEDICARE OP, ALT 636 FOR OP/ED): Performed by: STUDENT IN AN ORGANIZED HEALTH CARE EDUCATION/TRAINING PROGRAM

## 2025-04-03 PROCEDURE — 36415 COLL VENOUS BLD VENIPUNCTURE: CPT | Performed by: STUDENT IN AN ORGANIZED HEALTH CARE EDUCATION/TRAINING PROGRAM

## 2025-04-03 PROCEDURE — 83735 ASSAY OF MAGNESIUM: CPT | Performed by: STUDENT IN AN ORGANIZED HEALTH CARE EDUCATION/TRAINING PROGRAM

## 2025-04-03 PROCEDURE — 99232 SBSQ HOSP IP/OBS MODERATE 35: CPT | Performed by: STUDENT IN AN ORGANIZED HEALTH CARE EDUCATION/TRAINING PROGRAM

## 2025-04-03 PROCEDURE — 2500000001 HC RX 250 WO HCPCS SELF ADMINISTERED DRUGS (ALT 637 FOR MEDICARE OP): Performed by: INTERNAL MEDICINE

## 2025-04-03 PROCEDURE — 80048 BASIC METABOLIC PNL TOTAL CA: CPT | Performed by: STUDENT IN AN ORGANIZED HEALTH CARE EDUCATION/TRAINING PROGRAM

## 2025-04-03 PROCEDURE — 85025 COMPLETE CBC W/AUTO DIFF WBC: CPT | Performed by: STUDENT IN AN ORGANIZED HEALTH CARE EDUCATION/TRAINING PROGRAM

## 2025-04-03 PROCEDURE — 2500000001 HC RX 250 WO HCPCS SELF ADMINISTERED DRUGS (ALT 637 FOR MEDICARE OP): Performed by: STUDENT IN AN ORGANIZED HEALTH CARE EDUCATION/TRAINING PROGRAM

## 2025-04-03 PROCEDURE — 1100000001 HC PRIVATE ROOM DAILY

## 2025-04-03 PROCEDURE — 2500000004 HC RX 250 GENERAL PHARMACY W/ HCPCS (ALT 636 FOR OP/ED): Performed by: STUDENT IN AN ORGANIZED HEALTH CARE EDUCATION/TRAINING PROGRAM

## 2025-04-03 PROCEDURE — 94640 AIRWAY INHALATION TREATMENT: CPT

## 2025-04-03 PROCEDURE — 97530 THERAPEUTIC ACTIVITIES: CPT | Mod: GP,CQ

## 2025-04-03 PROCEDURE — 2500000005 HC RX 250 GENERAL PHARMACY W/O HCPCS: Performed by: STUDENT IN AN ORGANIZED HEALTH CARE EDUCATION/TRAINING PROGRAM

## 2025-04-03 PROCEDURE — 2500000004 HC RX 250 GENERAL PHARMACY W/ HCPCS (ALT 636 FOR OP/ED)

## 2025-04-03 PROCEDURE — 97116 GAIT TRAINING THERAPY: CPT | Mod: GP,CQ

## 2025-04-03 RX ORDER — OMEPRAZOLE 20 MG/1
40 CAPSULE, DELAYED RELEASE ORAL
COMMUNITY
End: 2025-04-06 | Stop reason: HOSPADM

## 2025-04-03 RX ORDER — LOPERAMIDE HYDROCHLORIDE 2 MG/1
2 CAPSULE ORAL DAILY
COMMUNITY
End: 2025-04-06 | Stop reason: HOSPADM

## 2025-04-03 RX ORDER — MIRTAZAPINE 15 MG/1
15 TABLET, FILM COATED ORAL NIGHTLY
Status: DISCONTINUED | OUTPATIENT
Start: 2025-04-03 | End: 2025-04-06 | Stop reason: HOSPADM

## 2025-04-03 RX ORDER — PREDNISONE 5 MG/1
1 TABLET ORAL
COMMUNITY
Start: 2024-12-23 | End: 2025-04-06 | Stop reason: HOSPADM

## 2025-04-03 RX ORDER — PREDNISONE 5 MG/1
5 TABLET ORAL
Status: DISCONTINUED | OUTPATIENT
Start: 2025-04-04 | End: 2025-04-06 | Stop reason: HOSPADM

## 2025-04-03 RX ORDER — POTASSIUM CHLORIDE 20 MEQ/1
40 TABLET, EXTENDED RELEASE ORAL ONCE
Status: COMPLETED | OUTPATIENT
Start: 2025-04-03 | End: 2025-04-03

## 2025-04-03 RX ORDER — POTASSIUM CHLORIDE 14.9 MG/ML
20 INJECTION INTRAVENOUS
Status: COMPLETED | OUTPATIENT
Start: 2025-04-03 | End: 2025-04-03

## 2025-04-03 RX ORDER — ATENOLOL 50 MG/1
50 TABLET ORAL DAILY
Status: DISCONTINUED | OUTPATIENT
Start: 2025-04-03 | End: 2025-04-06 | Stop reason: HOSPADM

## 2025-04-03 RX ORDER — FLUTICASONE FUROATE AND VILANTEROL 100; 25 UG/1; UG/1
1 POWDER RESPIRATORY (INHALATION)
Status: DISCONTINUED | OUTPATIENT
Start: 2025-04-03 | End: 2025-04-06 | Stop reason: HOSPADM

## 2025-04-03 RX ORDER — ALBUTEROL SULFATE 90 UG/1
2 INHALANT RESPIRATORY (INHALATION) EVERY 6 HOURS PRN
Status: DISCONTINUED | OUTPATIENT
Start: 2025-04-03 | End: 2025-04-06 | Stop reason: HOSPADM

## 2025-04-03 RX ORDER — FUROSEMIDE 40 MG/1
20 TABLET ORAL
Status: DISCONTINUED | OUTPATIENT
Start: 2025-04-04 | End: 2025-04-06 | Stop reason: HOSPADM

## 2025-04-03 RX ORDER — PRAVASTATIN SODIUM 20 MG/1
40 TABLET ORAL DAILY
Status: DISCONTINUED | OUTPATIENT
Start: 2025-04-03 | End: 2025-04-06 | Stop reason: HOSPADM

## 2025-04-03 RX ORDER — FUROSEMIDE 20 MG/1
1 TABLET ORAL
COMMUNITY
Start: 2024-12-23 | End: 2025-04-06 | Stop reason: HOSPADM

## 2025-04-03 RX ADMIN — POTASSIUM CHLORIDE 20 MEQ: 14.9 INJECTION, SOLUTION INTRAVENOUS at 13:07

## 2025-04-03 RX ADMIN — COLCHICINE 0.6 MG: 0.6 TABLET, FILM COATED ORAL at 09:02

## 2025-04-03 RX ADMIN — Medication 1 TABLET: at 09:02

## 2025-04-03 RX ADMIN — TIOTROPIUM BROMIDE INHALATION SPRAY 2 PUFF: 3.12 SPRAY, METERED RESPIRATORY (INHALATION) at 13:05

## 2025-04-03 RX ADMIN — Medication 1 TABLET: at 20:41

## 2025-04-03 RX ADMIN — POTASSIUM CHLORIDE 40 MEQ: 1500 TABLET, EXTENDED RELEASE ORAL at 09:14

## 2025-04-03 RX ADMIN — POTASSIUM CHLORIDE 20 MEQ: 14.9 INJECTION, SOLUTION INTRAVENOUS at 09:02

## 2025-04-03 RX ADMIN — PRAVASTATIN SODIUM 40 MG: 20 TABLET ORAL at 13:05

## 2025-04-03 RX ADMIN — COLCHICINE 0.6 MG: 0.6 TABLET, FILM COATED ORAL at 20:41

## 2025-04-03 RX ADMIN — MIRTAZAPINE 15 MG: 15 TABLET, FILM COATED ORAL at 20:42

## 2025-04-03 RX ADMIN — VANCOMYCIN HYDROCHLORIDE 1000 MG: 1 INJECTION, SOLUTION INTRAVENOUS at 18:16

## 2025-04-03 RX ADMIN — IPRATROPIUM BROMIDE AND ALBUTEROL SULFATE 3 ML: 2.5; .5 SOLUTION RESPIRATORY (INHALATION) at 00:58

## 2025-04-03 RX ADMIN — IPRATROPIUM BROMIDE AND ALBUTEROL SULFATE 3 ML: 2.5; .5 SOLUTION RESPIRATORY (INHALATION) at 21:27

## 2025-04-03 RX ADMIN — Medication 6 L/MIN: at 07:19

## 2025-04-03 RX ADMIN — FOLIC ACID 1 MG: 1 TABLET ORAL at 09:02

## 2025-04-03 RX ADMIN — FLUTICASONE FUROATE AND VILANTEROL TRIFENATATE 1 PUFF: 100; 25 POWDER RESPIRATORY (INHALATION) at 13:05

## 2025-04-03 RX ADMIN — IBUPROFEN 600 MG: 600 TABLET, FILM COATED ORAL at 09:02

## 2025-04-03 RX ADMIN — IBUPROFEN 600 MG: 600 TABLET, FILM COATED ORAL at 20:42

## 2025-04-03 RX ADMIN — PRIMIDONE 50 MG: 50 TABLET ORAL at 20:42

## 2025-04-03 RX ADMIN — IPRATROPIUM BROMIDE AND ALBUTEROL SULFATE 3 ML: 2.5; .5 SOLUTION RESPIRATORY (INHALATION) at 12:40

## 2025-04-03 RX ADMIN — IPRATROPIUM BROMIDE AND ALBUTEROL SULFATE 3 ML: 2.5; .5 SOLUTION RESPIRATORY (INHALATION) at 07:19

## 2025-04-03 RX ADMIN — ATENOLOL 50 MG: 50 TABLET ORAL at 13:05

## 2025-04-03 RX ADMIN — STANDARDIZED SENNA CONCENTRATE 17.2 MG: 8.6 TABLET ORAL at 20:41

## 2025-04-03 RX ADMIN — PRIMIDONE 50 MG: 50 TABLET ORAL at 09:02

## 2025-04-03 RX ADMIN — ENOXAPARIN SODIUM 40 MG: 40 INJECTION SUBCUTANEOUS at 20:42

## 2025-04-03 ASSESSMENT — COGNITIVE AND FUNCTIONAL STATUS - GENERAL
DAILY ACTIVITIY SCORE: 18
WALKING IN HOSPITAL ROOM: A LITTLE
HELP NEEDED FOR BATHING: A LITTLE
MOVING FROM LYING ON BACK TO SITTING ON SIDE OF FLAT BED WITH BEDRAILS: A LITTLE
DRESSING REGULAR LOWER BODY CLOTHING: A LOT
CLIMB 3 TO 5 STEPS WITH RAILING: TOTAL
STANDING UP FROM CHAIR USING ARMS: A LITTLE
CLIMB 3 TO 5 STEPS WITH RAILING: A LOT
MOVING TO AND FROM BED TO CHAIR: A LITTLE
TOILETING: A LOT
MOVING TO AND FROM BED TO CHAIR: A LITTLE
CLIMB 3 TO 5 STEPS WITH RAILING: A LOT
MOBILITY SCORE: 17
STANDING UP FROM CHAIR USING ARMS: A LOT
WALKING IN HOSPITAL ROOM: A LOT
DRESSING REGULAR LOWER BODY CLOTHING: A LOT
DRESSING REGULAR UPPER BODY CLOTHING: A LITTLE
DAILY ACTIVITIY SCORE: 18
HELP NEEDED FOR BATHING: A LITTLE
TOILETING: A LOT
WALKING IN HOSPITAL ROOM: A LITTLE
MOBILITY SCORE: 18
MOBILITY SCORE: 14
TURNING FROM BACK TO SIDE WHILE IN FLAT BAD: A LOT
MOVING TO AND FROM BED TO CHAIR: A LITTLE
TURNING FROM BACK TO SIDE WHILE IN FLAT BAD: A LITTLE
DRESSING REGULAR UPPER BODY CLOTHING: A LITTLE
TURNING FROM BACK TO SIDE WHILE IN FLAT BAD: A LITTLE
STANDING UP FROM CHAIR USING ARMS: A LITTLE

## 2025-04-03 ASSESSMENT — LIFESTYLE VARIABLES
AGITATION: NORMAL ACTIVITY
NAUSEA AND VOMITING: NO NAUSEA AND NO VOMITING
HEADACHE, FULLNESS IN HEAD: NOT PRESENT
AUDITORY DISTURBANCES: NOT PRESENT
TREMOR: NO TREMOR
VISUAL DISTURBANCES: NOT PRESENT
HEADACHE, FULLNESS IN HEAD: NOT PRESENT
ANXIETY: NO ANXIETY, AT EASE
ANXIETY: NO ANXIETY, AT EASE
ORIENTATION AND CLOUDING OF SENSORIUM: ORIENTED AND CAN DO SERIAL ADDITIONS
VISUAL DISTURBANCES: NOT PRESENT
TOTAL SCORE: 0
ORIENTATION AND CLOUDING OF SENSORIUM: ORIENTED AND CAN DO SERIAL ADDITIONS
AUDITORY DISTURBANCES: NOT PRESENT
PAROXYSMAL SWEATS: NO SWEAT VISIBLE
TREMOR: NO TREMOR
PAROXYSMAL SWEATS: NO SWEAT VISIBLE
AGITATION: NORMAL ACTIVITY

## 2025-04-03 ASSESSMENT — PAIN SCALES - GENERAL
PAINLEVEL_OUTOF10: 0 - NO PAIN
PAINLEVEL_OUTOF10: 0 - NO PAIN

## 2025-04-03 ASSESSMENT — PAIN - FUNCTIONAL ASSESSMENT
PAIN_FUNCTIONAL_ASSESSMENT: 0-10
PAIN_FUNCTIONAL_ASSESSMENT: 0-10

## 2025-04-03 NOTE — PROGRESS NOTES
Infectious Disease Progress Note       4/2/2025    79-year-old male with progressive left upper extremity swelling and pain with erythema over the past week or 2.  Apparently there is a history of scratching his hand causing it to bleed which prompted further evaluation in the emergency department.  No leukocytosis or fevers or chills    Lab Results   Component Value Date    WBC 6.9 04/02/2025    HGB 10.7 (L) 04/02/2025    HCT 33.3 (L) 04/02/2025    MCV 98 04/02/2025     04/02/2025     Lab Results   Component Value Date    GLUCOSE 113 (H) 04/02/2025    CALCIUM 8.5 (L) 04/02/2025     (L) 04/02/2025    K 3.4 (L) 04/02/2025    CO2 37 (H) 04/02/2025    CL 90 (L) 04/02/2025    BUN 24 (H) 04/02/2025    CREATININE 1.14 04/02/2025       WBC trends are being monitored. Antibiotic doses are being adjusted per most recent renal labs.     Vitals:    04/02/25 1953   BP:    Pulse:    Resp:    Temp:    SpO2: 95%     Patient is awake and alert  NAD  Neck supple  Heart S1S2  Chest: Equal expansion, bilaterally clear to auscultation  Abdomen: soft, ND, NTTP, no guarding  Extrem: Left wrist and hand less erythematous overall.  Still has swelling in the fifth finger.  Dusky nails.  Left olecranon bursa seems to be swollen and painful albeit not erythematous at this time  Skin: Lots of skin bruising in general  Neuro: CNS intact  Affect appropriate and patient is interactive        Patient Active Problem List   Diagnosis    Alcohol abuse    Alcohol withdrawal (Multi)    COPD (chronic obstructive pulmonary disease) (Multi)    COVID-19 virus infection    Fall, accidental    HLD (hyperlipidemia)    HTN (hypertension)    Osteoarthritis of right foot joint    Seborrheic dermatitis    Tremor    Elevated LFTs    Generalized weakness           ASSESSMENT:  Left upper extremity cellulitis with left olecranon bursitis  Superficial vein thrombosis with partially occlusive basilic vein thrombosis noted on left upper extremity on  vascular ultrasound  History of COPD, gout, essential tremor, alcohol dependence (2-3 beers daily)      PLAN:  Photo uploaded  Improving slowly  Continue only vancomycin at this point.  If he continues to improve then possible transition to doxycycline x 14 days for discharge      Imaging and labs were reviewed per medical records and any ID pertinent labs were also addressed  Time spent before, during and after care today, including coordination of care >40 min      Phoebe Vicente DO

## 2025-04-03 NOTE — SIGNIFICANT EVENT
04/03/25 0719   Inhalation Therapy   Breath Sounds Pre-Treatment Right Clear   Breath Sounds Pre-Treatment Left Clear   Pre-Treatment Pulse 92   Pre-Treatment Respirations 18   Breath Sounds Post-Treatment Right Clear   Breath Sounds Post-Treatment Left Clear   Post-Treatment Pulse 90   Post-Treatment Respirations 18   Delivery Source Oxygen   Position Supine   Treatment Tolerance Tolerated well   $ Aerosol/MDI Treatment Charge Aerosol/inhalation treatment     Upon assessment, I observed patient on 6LNC, treatment administered per order

## 2025-04-03 NOTE — PROGRESS NOTES
ASSESSMENT & PLAN:     LUE Cellulitis  -Given extent of edema/erythema CT of LUE was obtained. Findings consitent with cellulitis of dorsum of hand appreciated. No evidence of abscess, infectious myositis, or tenosynovitis was noted.    -ID following, recs appreciated.  -Cont Vanc/cefazolin.   -Follow BCx, NGTD     Superficial Vein Thrombosis   -Partially occlusive Basilic Vein thrombosis noted on LUE vascular US.   -Will manage with warm compresses/ibuprofen for now     Hx Gout  -Continue empiric coverage for flare. Uric acid level elevated, 10.4.   -Cont colchicine 0.6 mg BID      Essential Tremor  -On primidone 100 mg BID, will continue     ETOH Dependence  -Drinks 2-3 beers daily. Denies hx of withdrawal  -Will monitor on CIWA  -Thiamine/folate/MV     Severe COPD  -On 6L O2 at baseline.   -Scheduled/PRN nebs. As not in exacerbation will defer steroid administration     Ambulatory dysfunction  -PT/OT eval appreciated. [facility] 11. Care coordinators working on placement to SNF.      VTE Prophylaxis: Lovenox SubQ      Dre Vergara MD    SUBJECTIVE     Patient had no acute events overnight.  Denies any difficulty breathing, fevers, or chills. Hand swelling/erythema appears to be improving. Further ROS was unremarkable.     OBJECTIVE:       Last Recorded Vitals:  Vitals:    04/02/25 1953 04/03/25 0058 04/03/25 0512 04/03/25 0753   BP:   131/77 126/66   Patient Position:       Pulse:   94 (!) 133   Resp:   18 16   Temp:   36.2 °C (97.2 °F) 36.2 °C (97.2 °F)   TempSrc:   Temporal    SpO2: 95% 95% 95% 91%   Weight:       Height:           Last I/O:  I/O last 3 completed shifts:  In: 1430 (20.7 mL/kg) [P.O.:880; IV Piggyback:550]  Out: 500 (7.2 mL/kg) [Urine:500 (0.2 mL/kg/hr)]  Weight: 69.2 kg     Physical Exam:  General: Ill-appearing elderly male in mild distress  HEENT: Clear sclera, EOMI, trachea midline, moist mucous membranes  Respiratory: Equal chest rise, no retractions  Abdomen: Soft, nontender,  nondistended  Extremities: LUE erythema and swelling from mid phalanges to one third forearm, appears improving.  Forearm wound covered in clean bandaging.  Neurological: Spontaneously moves all extremities, no dysarthria, cranial nerves grossly intact  Psychiatric: Appropriate mood and affect  Skin: Warm, dry    Inpatient Medications:  colchicine, 0.6 mg, oral, BID  enoxaparin, 40 mg, subcutaneous, q24h  folic acid, 1 mg, oral, Daily  ibuprofen, 600 mg, oral, BID  ipratropium-albuteroL, 3 mL, nebulization, q6h  lactobacillus acidophilus, 1 tablet, oral, BID  multivitamin with minerals, 1 tablet, oral, Daily  polyethylene glycol, 17 g, oral, Daily  potassium chloride, 20 mEq, intravenous, q2h  primidone, 100 mg/day, oral, BID  sennosides, 2 tablet, oral, Nightly  [START ON 4/4/2025] thiamine, 100 mg, oral, Daily  vancomycin, 1,000 mg, intravenous, q24h        PRN Medications  PRN medications: HYDROmorphone, HYDROmorphone, ipratropium-albuteroL, LORazepam **OR** LORazepam **OR** LORazepam, vancomycin    Continuous Medications:  oxygen, , Last Rate: 6 L/min (04/03/25 0719)          LABS AND IMAGING:     Labs:  Results for orders placed or performed during the hospital encounter of 04/01/25 (from the past 24 hours)   Magnesium   Result Value Ref Range    Magnesium 1.67 1.60 - 2.40 mg/dL   Basic Metabolic Panel   Result Value Ref Range    Glucose 99 74 - 99 mg/dL    Sodium 137 136 - 145 mmol/L    Potassium 3.2 (L) 3.5 - 5.3 mmol/L    Chloride 95 (L) 98 - 107 mmol/L    Bicarbonate 34 (H) 21 - 32 mmol/L    Anion Gap 11 10 - 20 mmol/L    Urea Nitrogen 17 6 - 23 mg/dL    Creatinine 0.87 0.50 - 1.30 mg/dL    eGFR 88 >60 mL/min/1.73m*2    Calcium 8.2 (L) 8.6 - 10.3 mg/dL   CBC and Auto Differential   Result Value Ref Range    WBC 5.6 4.4 - 11.3 x10*3/uL    nRBC 0.0 0.0 - 0.0 /100 WBCs    RBC 3.22 (L) 4.50 - 5.90 x10*6/uL    Hemoglobin 10.1 (L) 13.5 - 17.5 g/dL    Hematocrit 31.5 (L) 41.0 - 52.0 %    MCV 98 80 - 100 fL    MCH  31.4 26.0 - 34.0 pg    MCHC 32.1 32.0 - 36.0 g/dL    RDW 11.9 11.5 - 14.5 %    Platelets 179 150 - 450 x10*3/uL    Neutrophils % 69.9 40.0 - 80.0 %    Immature Granulocytes %, Automated 0.4 0.0 - 0.9 %    Lymphocytes % 14.2 13.0 - 44.0 %    Monocytes % 11.9 2.0 - 10.0 %    Eosinophils % 3.2 0.0 - 6.0 %    Basophils % 0.4 0.0 - 2.0 %    Neutrophils Absolute 3.89 1.60 - 5.50 x10*3/uL    Immature Granulocytes Absolute, Automated 0.02 0.00 - 0.50 x10*3/uL    Lymphocytes Absolute 0.79 (L) 0.80 - 3.00 x10*3/uL    Monocytes Absolute 0.66 0.05 - 0.80 x10*3/uL    Eosinophils Absolute 0.18 0.00 - 0.40 x10*3/uL    Basophils Absolute 0.02 0.00 - 0.10 x10*3/uL        Imaging:  CT hand left w IV contrast  Narrative: Interpreted By:  Sis Cook,   STUDY:  CT HAND LEFT W IV CONTRAST; ;  4/2/2025 11:23 am      INDICATION:  Signs/Symptoms:LUE cellulitis.          COMPARISON:  None.      ACCESSION NUMBER(S):  [id number]      ORDERING CLINICIAN:  HIRAL MIKE      TECHNIQUE:  Serial axial CT images obtained of the left hand following  intravenous administration of the 75 mL of Omnipaque 350. Images  reformatted in the coronal and sagittal projection      FINDINGS:  Generalized subcutaneous edema demonstrated within the dorsum of the  hand in particular about the 1st and 2nd metacarpal shafts extending  into the digits distally. More focal edema along the dorsum of the  1st metacarpal shafts with cellulitis in the appropriate clinical  situation. No discrete rim enhancing fluid collection to suggest  abscess formation. Component of edema within the digits distally in  keeping with cellulitis. No abscess formation. 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      MCP joints demonstrate no joint effusions. There is mild MCP joint  osteoarthritis no erosions about the articulations are visualized  portions of the digits distally      Flexor tendons visualized portions unremarkable. No tenosynovitis.  Extensor tendons visualized  portions demonstrate no tenosynovitis  tracking proximally.      Musculature of the thenar and hypothenar eminence is unremarkable.                      Impression: 1. Cellulitis dorsum of the hand in particular about the 1st/2nd  metacarpal shafts with cellulitis in the appropriate clinical  situation. No focal fluid density or rim enhancing collection to  suggest abscess formation.      2. No asymmetric hypoenhancement of the musculature to suggest  infectious myositis. No tenosynovitis demonstrated.          MACRO:  None      Signed by: Sis Cook 4/2/2025 12:20 PM  Dictation workstation:   [id number]

## 2025-04-03 NOTE — SIGNIFICANT EVENT
04/03/25 1240   Inhalation Therapy   Breath Sounds Pre-Treatment Right Clear   Breath Sounds Pre-Treatment Left Clear   Pre-Treatment Pulse 97   Pre-Treatment Respirations 18   Breath Sounds Post-Treatment Right Clear   Breath Sounds Post-Treatment Left Clear   Post-Treatment Pulse 96   Post-Treatment Respirations 18   Delivery Source Oxygen   Position Supine   Treatment Tolerance Tolerated well   $ Aerosol/MDI Treatment Charge Aerosol/inhalation treatment     Upon assessment, I observed patient on 6LNC, treatment administered per order

## 2025-04-03 NOTE — PROGRESS NOTES
04/03/25 1212   Discharge Planning   Home or Post Acute Services Post acute facilities (Rehab/SNF/etc)   Type of Post Acute Facility Services Rehab;Skilled nursing   Expected Discharge Disposition SNF   Does the patient need discharge transport arranged? Yes   RoundTrip coordination needed? Yes   Has discharge transport been arranged? No     [facility] scores are PT (11) OT (16) recommending continued therapy at moderate level/intensity. Followed up with pt and his daughter Anaya [phone] who are in agreement to SNF at [place] and FOC is [facility]. Referral built and sent to [facility] SNF, awaiting acceptance. Pt is Medicare insurance that will require 3 IP midnights to qualify, Pt is Inpatient and would qualify to go to SNF on Friday 4/4/25. Pt with Left upper extremity cellulitis, cultures pending, still awaiting ID recommendations to determine needs- possible PICC and long term antibiotics. CT team will monitor case for progression and DC planning.    No indicators present

## 2025-04-03 NOTE — CARE PLAN
The patient's goals for the shift include sleep.     The clinical goals for the shift include comfort and safety.    Over the shift, the patient did not make progress toward the following goals. Barriers to progression include disease process. Recommendations to address these barriers include notify physician.    Problem: Risk for Peripheral Neurovascular Dysfunction  Goal: Patient will not display a decrease in peripheral pulses or pallor. Patient will not report feelings of numbness and tingling (paresthesia).  Outcome: Not Progressing (left 4th finger blanched with cyanotic nail bed and prolonged capillary refill)

## 2025-04-03 NOTE — PROGRESS NOTES
"Physical Therapy    Physical Therapy Treatment    Patient Name: Rodrigo Conte  MRN: [id number]  Today's Date: 4/3/2025  Time Calculation  Start Time: 1031  Stop Time: 1055  Time Calculation (min): 24 min     623/623-A    Assessment/Plan   PT Assessment  PT Assessment Results: Decreased mobility, Decreased strength  Rehab Prognosis: Good  Barriers to Discharge Home:  (Alone while daughter works. Requiring increased assistance for mobilty.)  Evaluation/Treatment Tolerance: Patient limited by fatigue  Medical Staff Made Aware: Yes  End of Session Communication: Bedside nurse  Assessment Comment: pt is receptive to instruction and motivated to participate, would benefit from continued therapy to improve functional mobility and safety  End of Session Patient Position: Alarm on, Up in chair     Treatment/Interventions: Transfer training, Gait training, Therapeutic exercise, Therapeutic activity, Home exercise program  PT Plan: Ongoing PT  PT Frequency: 3 times per week  PT Discharge Recommendations: Moderate intensity level of continued care, Low intensity level of continued care    PT Recommended Transfer Status: Assist x1, Assistive device    General Visit Information:   PT  Visit  PT Received On: 04/03/25  General  Reason for Referral: Impaired mobility  Past Medical History Relevant to Rehab: COPD, dyslipidemia, HTN, 6Lo2 home, L IF and MF amp from saw accident 40 yrs ago. ETOH, covid 19, tremors, OA R foot  Family/Caregiver Present: No  Prior to Session Communication: Bedside nurse (cleared to participate)  Patient Position Received: Alarm on, Bed, 3 rail up  General Comment: agreeable to particpate, states that he hopes to get stronger and be able to go home, but is \"open to going somewhere if I have to\"    General Observations:   General Observation: external catheter discontinued prior to gait, visible tremors noted during transfers and gait    Subjective     Precautions:  Precautions  Medical Precautions: Fall " precautions    Vital Signs:  Vital Signs  Heart Rate: (!) 119 (105 at rest)  SpO2: 93 % (decreased to 88% after gait, recovering to 91 to 93% with short seated rest)  Objective     Pain:  Pain Assessment  Pain Assessment: 0-10  0-10 (Numeric) Pain Score: 0 - No pain (denies pain)    Cognition:  Cognition  Overall Cognitive Status: Within Functional Limits  Orientation Level: Oriented X4  Processing Speed: Delayed      Activity Tolerance:  Activity Tolerance  Endurance: Decreased tolerance for upright activites    Treatments:  Therapeutic Exercise  Therapeutic Exercise Performed:  (pt performed ther ex including AP, QS, GS, LAQ and hip flexion.  Pt performed multilevel reach standing with single UE support 2inch out of ANNE with Min  A , reaching with R and L UE, no LOB noted)     Bed Mobility  Bed Mobility: Yes  Bed Mobility 1  Bed Mobility Comments 1: transfers supine --> sit with  Johny and head of bed elevated , vc for hand placement   Pt able to scoot to EOB with Min A and increased time  Ambulation/Gait Training  Ambulation/Gait Training Performed: Yes  Ambulation/Gait Training 1  Surface 1: Level tile  Device 1: Rolling walker  Comments/Distance (ft) 1: ambulating     ft with   WW and (3ft , 5ft and 10ft with WW and Min A , slow uneven steps , tremors noted but no LOB with gait)  Transfers  Transfer: Yes  Transfer 1  Technique 1: Sit to stand, Stand to sit  Trials/Comments 1: transfers sit <--> stand with   Johny and vc for technique,  hand placement and safety  Stairs  Stairs: No        Outcome Measures:     [facility] Basic Mobility  Turning from your back to your side while in a flat bed without using bedrails: A little  Moving from lying on your back to sitting on the side of a flat bed without using bedrails: A lot  Moving to and from bed to chair (including a wheelchair): A little  Standing up from a chair using your arms (e.g. wheelchair or bedside chair): A lot  To walk in hospital room: A little  Climbing 3-5  "steps with railing: Total  Basic Mobility - Total Score: 14         EDUCATION:    Individual(s) Educated: Patient  Education Provided: Fall Risk, Home Exercise Program, Home Safety  Patient Response to Education: Patient/Caregiver Verbalized Understanding of Information    Encounter Problems       Encounter Problems (Active)       PT Problem       Bed mobility supine <> sit modified independent  (Progressing)       Start:  04/02/25    Expected End:  04/16/25            Transfers sit <> stand/bed<> chair with ww SBA (Progressing)       Start:  04/02/25    Expected End:  04/16/25            Patient ambulated with ww 30' SBA (Progressing)       Start:  04/02/25    Expected End:  04/16/25            Patient to demonstrate multilevel reach with single  UE support > 2\" out of ANNE in stand (Not Progressing)       Start:  04/02/25    Expected End:  04/16/25                   "

## 2025-04-04 LAB
ANION GAP SERPL CALC-SCNC: 8 MMOL/L (ref 10–20)
BASOPHILS # BLD AUTO: 0.03 X10*3/UL (ref 0–0.1)
BASOPHILS NFR BLD AUTO: 0.6 %
BUN SERPL-MCNC: 12 MG/DL (ref 6–23)
CALCIUM SERPL-MCNC: 8.4 MG/DL (ref 8.6–10.3)
CHLORIDE SERPL-SCNC: 101 MMOL/L (ref 98–107)
CO2 SERPL-SCNC: 35 MMOL/L (ref 21–32)
CREAT SERPL-MCNC: 0.94 MG/DL (ref 0.5–1.3)
EGFRCR SERPLBLD CKD-EPI 2021: 82 ML/MIN/1.73M*2
EOSINOPHIL # BLD AUTO: 0.34 X10*3/UL (ref 0–0.4)
EOSINOPHIL NFR BLD AUTO: 6.4 %
ERYTHROCYTE [DISTWIDTH] IN BLOOD BY AUTOMATED COUNT: 11.9 % (ref 11.5–14.5)
GLUCOSE SERPL-MCNC: 97 MG/DL (ref 74–99)
HCT VFR BLD AUTO: 32.6 % (ref 41–52)
HGB BLD-MCNC: 10.2 G/DL (ref 13.5–17.5)
IMM GRANULOCYTES # BLD AUTO: 0.02 X10*3/UL (ref 0–0.5)
IMM GRANULOCYTES NFR BLD AUTO: 0.4 % (ref 0–0.9)
LYMPHOCYTES # BLD AUTO: 0.9 X10*3/UL (ref 0.8–3)
LYMPHOCYTES NFR BLD AUTO: 16.9 %
MAGNESIUM SERPL-MCNC: 1.82 MG/DL (ref 1.6–2.4)
MCH RBC QN AUTO: 31.6 PG (ref 26–34)
MCHC RBC AUTO-ENTMCNC: 31.3 G/DL (ref 32–36)
MCV RBC AUTO: 101 FL (ref 80–100)
MONOCYTES # BLD AUTO: 0.61 X10*3/UL (ref 0.05–0.8)
MONOCYTES NFR BLD AUTO: 11.4 %
NEUTROPHILS # BLD AUTO: 3.44 X10*3/UL (ref 1.6–5.5)
NEUTROPHILS NFR BLD AUTO: 64.3 %
NRBC BLD-RTO: 0 /100 WBCS (ref 0–0)
PLATELET # BLD AUTO: 205 X10*3/UL (ref 150–450)
POTASSIUM SERPL-SCNC: 4.3 MMOL/L (ref 3.5–5.3)
RBC # BLD AUTO: 3.23 X10*6/UL (ref 4.5–5.9)
SODIUM SERPL-SCNC: 140 MMOL/L (ref 136–145)
STAPHYLOCOCCUS SPEC CULT: NORMAL
VANCOMYCIN SERPL-MCNC: 14.8 UG/ML (ref 5–20)
WBC # BLD AUTO: 5.3 X10*3/UL (ref 4.4–11.3)

## 2025-04-04 PROCEDURE — 1100000001 HC PRIVATE ROOM DAILY

## 2025-04-04 PROCEDURE — 2500000004 HC RX 250 GENERAL PHARMACY W/ HCPCS (ALT 636 FOR OP/ED)

## 2025-04-04 PROCEDURE — 80048 BASIC METABOLIC PNL TOTAL CA: CPT | Performed by: STUDENT IN AN ORGANIZED HEALTH CARE EDUCATION/TRAINING PROGRAM

## 2025-04-04 PROCEDURE — 36415 COLL VENOUS BLD VENIPUNCTURE: CPT | Performed by: STUDENT IN AN ORGANIZED HEALTH CARE EDUCATION/TRAINING PROGRAM

## 2025-04-04 PROCEDURE — 2500000004 HC RX 250 GENERAL PHARMACY W/ HCPCS (ALT 636 FOR OP/ED): Performed by: STUDENT IN AN ORGANIZED HEALTH CARE EDUCATION/TRAINING PROGRAM

## 2025-04-04 PROCEDURE — 99232 SBSQ HOSP IP/OBS MODERATE 35: CPT | Performed by: STUDENT IN AN ORGANIZED HEALTH CARE EDUCATION/TRAINING PROGRAM

## 2025-04-04 PROCEDURE — 83735 ASSAY OF MAGNESIUM: CPT | Performed by: STUDENT IN AN ORGANIZED HEALTH CARE EDUCATION/TRAINING PROGRAM

## 2025-04-04 PROCEDURE — 2500000002 HC RX 250 W HCPCS SELF ADMINISTERED DRUGS (ALT 637 FOR MEDICARE OP, ALT 636 FOR OP/ED): Performed by: STUDENT IN AN ORGANIZED HEALTH CARE EDUCATION/TRAINING PROGRAM

## 2025-04-04 PROCEDURE — 2500000001 HC RX 250 WO HCPCS SELF ADMINISTERED DRUGS (ALT 637 FOR MEDICARE OP): Performed by: STUDENT IN AN ORGANIZED HEALTH CARE EDUCATION/TRAINING PROGRAM

## 2025-04-04 PROCEDURE — 2500000001 HC RX 250 WO HCPCS SELF ADMINISTERED DRUGS (ALT 637 FOR MEDICARE OP): Performed by: INTERNAL MEDICINE

## 2025-04-04 PROCEDURE — 80202 ASSAY OF VANCOMYCIN: CPT

## 2025-04-04 PROCEDURE — 85025 COMPLETE CBC W/AUTO DIFF WBC: CPT | Performed by: STUDENT IN AN ORGANIZED HEALTH CARE EDUCATION/TRAINING PROGRAM

## 2025-04-04 PROCEDURE — 94640 AIRWAY INHALATION TREATMENT: CPT

## 2025-04-04 RX ADMIN — FLUTICASONE FUROATE AND VILANTEROL TRIFENATATE 1 PUFF: 100; 25 POWDER RESPIRATORY (INHALATION) at 09:55

## 2025-04-04 RX ADMIN — Medication 1 TABLET: at 09:55

## 2025-04-04 RX ADMIN — IPRATROPIUM BROMIDE AND ALBUTEROL SULFATE 3 ML: 2.5; .5 SOLUTION RESPIRATORY (INHALATION) at 13:44

## 2025-04-04 RX ADMIN — IBUPROFEN 600 MG: 600 TABLET, FILM COATED ORAL at 09:55

## 2025-04-04 RX ADMIN — Medication 1 TABLET: at 20:05

## 2025-04-04 RX ADMIN — ATENOLOL 50 MG: 50 TABLET ORAL at 09:55

## 2025-04-04 RX ADMIN — IPRATROPIUM BROMIDE AND ALBUTEROL SULFATE 3 ML: 2.5; .5 SOLUTION RESPIRATORY (INHALATION) at 02:29

## 2025-04-04 RX ADMIN — PRAVASTATIN SODIUM 40 MG: 20 TABLET ORAL at 09:55

## 2025-04-04 RX ADMIN — PRIMIDONE 50 MG: 50 TABLET ORAL at 10:18

## 2025-04-04 RX ADMIN — PRIMIDONE 50 MG: 50 TABLET ORAL at 20:05

## 2025-04-04 RX ADMIN — IPRATROPIUM BROMIDE AND ALBUTEROL SULFATE 3 ML: 2.5; .5 SOLUTION RESPIRATORY (INHALATION) at 19:47

## 2025-04-04 RX ADMIN — VANCOMYCIN HYDROCHLORIDE 1250 MG: 1.25 INJECTION, POWDER, LYOPHILIZED, FOR SOLUTION INTRAVENOUS at 18:08

## 2025-04-04 RX ADMIN — TIOTROPIUM BROMIDE INHALATION SPRAY 2 PUFF: 3.12 SPRAY, METERED RESPIRATORY (INHALATION) at 09:55

## 2025-04-04 RX ADMIN — ENOXAPARIN SODIUM 40 MG: 40 INJECTION SUBCUTANEOUS at 20:08

## 2025-04-04 RX ADMIN — COLCHICINE 0.6 MG: 0.6 TABLET, FILM COATED ORAL at 20:06

## 2025-04-04 RX ADMIN — MIRTAZAPINE 15 MG: 15 TABLET, FILM COATED ORAL at 20:05

## 2025-04-04 RX ADMIN — COLCHICINE 0.6 MG: 0.6 TABLET, FILM COATED ORAL at 09:55

## 2025-04-04 RX ADMIN — Medication 100 MG: at 18:08

## 2025-04-04 RX ADMIN — IBUPROFEN 600 MG: 600 TABLET, FILM COATED ORAL at 20:05

## 2025-04-04 RX ADMIN — FOLIC ACID 1 MG: 1 TABLET ORAL at 09:55

## 2025-04-04 RX ADMIN — IPRATROPIUM BROMIDE AND ALBUTEROL SULFATE 3 ML: 2.5; .5 SOLUTION RESPIRATORY (INHALATION) at 07:51

## 2025-04-04 ASSESSMENT — LIFESTYLE VARIABLES
ANXIETY: NO ANXIETY, AT EASE
HEADACHE, FULLNESS IN HEAD: NOT PRESENT
VISUAL DISTURBANCES: NOT PRESENT
AGITATION: NORMAL ACTIVITY
TOTAL SCORE: 0
AUDITORY DISTURBANCES: NOT PRESENT
TREMOR: NO TREMOR
NAUSEA AND VOMITING: NO NAUSEA AND NO VOMITING
PAROXYSMAL SWEATS: NO SWEAT VISIBLE
ORIENTATION AND CLOUDING OF SENSORIUM: ORIENTED AND CAN DO SERIAL ADDITIONS

## 2025-04-04 ASSESSMENT — COGNITIVE AND FUNCTIONAL STATUS - GENERAL
TOILETING: A LOT
MOVING TO AND FROM BED TO CHAIR: A LITTLE
MOBILITY SCORE: 18
WALKING IN HOSPITAL ROOM: A LITTLE
HELP NEEDED FOR BATHING: A LITTLE
DAILY ACTIVITIY SCORE: 18
TURNING FROM BACK TO SIDE WHILE IN FLAT BAD: A LITTLE
STANDING UP FROM CHAIR USING ARMS: A LITTLE
DRESSING REGULAR LOWER BODY CLOTHING: A LOT
CLIMB 3 TO 5 STEPS WITH RAILING: A LOT
DRESSING REGULAR UPPER BODY CLOTHING: A LITTLE

## 2025-04-04 ASSESSMENT — PAIN SCALES - GENERAL
PAINLEVEL_OUTOF10: 4
PAINLEVEL_OUTOF10: 0 - NO PAIN

## 2025-04-04 ASSESSMENT — PAIN - FUNCTIONAL ASSESSMENT: PAIN_FUNCTIONAL_ASSESSMENT: 0-10

## 2025-04-04 ASSESSMENT — PAIN DESCRIPTION - ORIENTATION: ORIENTATION: LEFT

## 2025-04-04 ASSESSMENT — PAIN DESCRIPTION - LOCATION: LOCATION: ARM

## 2025-04-04 NOTE — PROGRESS NOTES
04/04/25 1232   Discharge Planning   Home or Post Acute Services Post acute facilities (Rehab/SNF/etc)   Type of Post Acute Facility Services Rehab;Skilled nursing   Expected Discharge Disposition SNF   Does the patient need discharge transport arranged? Yes   RoundTrip coordination needed? Yes   Has discharge transport been arranged? No     Pt/family DC preference remains [facility] SNF upon DC. Pt has met IP 3 Medicare midnight criteria. Still awaiting ID final recommendations- IV vs PO on discharge for Left upper extremity cellulitis- per note if continued to improve then possible PO Doxycycline x 14 days at Dc. AMPAC scores are PT (11) OT (16) recommending continued therapy at moderate level/intensity. [facility] SNF updated with this information. ADOD still TBD dependent on if pt will require Ivs or not. CT Team will continue monitoring case for progression and DC planning.

## 2025-04-04 NOTE — CARE PLAN
Problem: Chronic Conditions and Co-morbidities  Goal: Patient's chronic conditions and co-morbidity symptoms are monitored and maintained or improved  Outcome: Progressing     Problem: Nutrition  Goal: Nutrient intake appropriate for maintaining nutritional needs  Outcome: Progressing     Problem: Skin  Goal: Participates in plan/prevention/treatment measures  Outcome: Progressing  Goal: Prevent/manage excess moisture  Outcome: Progressing  Goal: Prevent/minimize sheer/friction injuries  Outcome: Progressing  Goal: Promote/optimize nutrition  Outcome: Progressing  Goal: Promote skin healing  Outcome: Progressing  Goal: Decreased wound size/increased tissue granulation at next dressing change  Outcome: Progressing     Problem: Fall/Injury  Goal: Not fall by end of shift  Outcome: Progressing  Goal: Be free from injury by end of the shift  Outcome: Progressing  Goal: Verbalize understanding of personal risk factors for fall in the hospital  Outcome: Progressing  Goal: Verbalize understanding of risk factor reduction measures to prevent injury from fall in the home  Outcome: Progressing  Goal: Use assistive devices by end of the shift  Outcome: Progressing  Goal: Pace activities to prevent fatigue by end of the shift  Outcome: Progressing     Problem: Pain  Goal: Takes deep breaths with improved pain control throughout the shift  Outcome: Progressing  Goal: Turns in bed with improved pain control throughout the shift  Outcome: Progressing  Goal: Walks with improved pain control throughout the shift  Outcome: Progressing  Goal: Performs ADL's with improved pain control throughout shift  Outcome: Progressing  Goal: Participates in PT with improved pain control throughout the shift  Outcome: Progressing  Goal: Free from opioid side effects throughout the shift  Outcome: Progressing  Goal: Free from acute confusion related to pain meds throughout the shift  Outcome: Progressing

## 2025-04-04 NOTE — PROGRESS NOTES
Infectious Disease Progress Note       4/4/2025    79-year-old male with progressive left upper extremity swelling and pain with erythema over the past week or 2.  Apparently there is a history of scratching his hand causing it to bleed which prompted further evaluation in the emergency department.  No leukocytosis or fevers or chills    Lab Results   Component Value Date    WBC 5.3 04/04/2025    HGB 10.2 (L) 04/04/2025    HCT 32.6 (L) 04/04/2025     (H) 04/04/2025     04/04/2025     Lab Results   Component Value Date    GLUCOSE 97 04/04/2025    CALCIUM 8.4 (L) 04/04/2025     04/04/2025    K 4.3 04/04/2025    CO2 35 (H) 04/04/2025     04/04/2025    BUN 12 04/04/2025    CREATININE 0.94 04/04/2025       WBC trends are being monitored. Antibiotic doses are being adjusted per most recent renal labs.     Vitals:    04/04/25 1514   BP: 104/64   Pulse: 91   Resp: 18   Temp: 35.8 °C (96.4 °F)   SpO2: 96%     Patient is awake and alert  NAD  Neck supple  Heart S1S2  Chest: Equal expansion, bilaterally clear to auscultation  Abdomen: soft, ND, NTTP, no guarding  Extrem: Left wrist and hand less erythematous overall.  Still has swelling in the fifth finger.  Dusky nails.  Left olecranon bursa seems to be swollen and painful albeit not erythematous at this time  Skin: Lots of skin bruising in general  Neuro: CNS intact  Affect appropriate and patient is interactive        Patient Active Problem List   Diagnosis    Alcohol abuse    Alcohol withdrawal (Multi)    COPD (chronic obstructive pulmonary disease) (Multi)    COVID-19 virus infection    Fall, accidental    HLD (hyperlipidemia)    HTN (hypertension)    Osteoarthritis of right foot joint    Seborrheic dermatitis    Tremor    Elevated LFTs    Generalized weakness           ASSESSMENT:  Left upper extremity cellulitis with left olecranon bursitis  Superficial vein thrombosis with partially occlusive basilic vein thrombosis noted on left upper  extremity on vascular ultrasound  History of COPD, gout, essential tremor, alcohol dependence (2-3 beers daily)      PLAN:  Photo uploaded  Improving slowly  Continue only vancomycin at this point.  If he continues to improve then possible transition to doxycycline x 14 days for discharge      Imaging and labs were reviewed per medical records and any ID pertinent labs were also addressed  Time spent before, during and after care today, including coordination of care >40 min      Phoebe Vicente DO

## 2025-04-04 NOTE — PROGRESS NOTES
Vancomycin Dosing by Pharmacy- FOLLOW UP    Rodrigo Conte is a 79 y.o. year old male who Pharmacy has been consulted for vancomycin dosing for cellulitis, skin and soft tissue. Based on the patient's indication and renal status this patient is being dosed based on a goal AUC of 400-600.     Renal function is currently stable.    Current vancomycin dose: 1000 mg given every 24 hours    Estimated vancomycin AUC on current dose: 358 mg/L.hr     Visit Vitals  /66 (BP Location: Right arm, Patient Position: Lying)   Pulse 78   Temp 35 °C (95 °F)   Resp 20        Lab Results   Component Value Date    CREATININE 0.94 2025    CREATININE 0.87 2025    CREATININE 1.14 2025    CREATININE 0.94 2025        Patient weight is as follows:   Vitals:    25   Weight: 69.2 kg (152 lb 8.9 oz)       Cultures:  No results found for the encounter in last 14 days.       I/O last 3 completed shifts:  In: 980 (14.2 mL/kg) [P.O.:880; IV Piggyback:100]  Out: 800 (11.6 mL/kg) [Urine:800 (0.3 mL/kg/hr)]  Weight: 69.2 kg   I/O during current shift:  I/O this shift:  In: 200 [IV Piggyback:200]  Out: -     Temp (24hrs), Av.9 °C (96.6 °F), Min:35 °C (95 °F), Max:36.5 °C (97.7 °F)      Assessment/Plan    Below goal AUC. Orders placed for new vancomcyin regimen of 1250 every 24 hours to begin at 1800.     This dosing regimen is predicted by [facility] to result in the following pharmacokinetic parameters:  Regimen: 1250 mg IV every 24 hours.  Start time: 18:16 on 2025  Exposure target: AUC24 (range)400-600 mg/L.hr   [id number]: 426 mg/L.hr  AUC24,ss: 441 mg/L.hr  Probability of AUC24 > 400: 76 %  Ctrough,ss: 11.8 mg/L  Probability of Ctrough,ss > 20: 1 %      The next level will be obtained on  at 0500. May be obtained sooner if clinically indicated.   Will continue to monitor renal function daily while on vancomycin and order serum creatinine at least every 48 hours if not already ordered.  Follow for  continued vancomycin needs, clinical response, and signs/symptoms of toxicity.       Leia Alejandre, RPh

## 2025-04-04 NOTE — PROGRESS NOTES
Occupational Therapy                 Therapy Communication Note    Patient Name: Rodrigo Conte  MRN: [id number]  Department: [facility]  Room: [address] [place] [address]  Today's Date: 4/4/2025     Discipline: Occupational Therapy            Comment: OT tx attempted, pt eating bfast (trays delivered at 7:15am). Will reattempt as able

## 2025-04-04 NOTE — CARE PLAN
The patient's goals for the shift include      The clinical goals for the shift include Patient will remain safe and HD stable during this shift.    Problem: Chronic Conditions and Co-morbidities  Goal: Patient's chronic conditions and co-morbidity symptoms are monitored and maintained or improved  4/4/2025 0725 by Willie Booth RN  Outcome: Progressing  4/3/2025 2322 by Willie Booth RN  Outcome: Progressing     Problem: Nutrition  Goal: Nutrient intake appropriate for maintaining nutritional needs  4/4/2025 0725 by Willie Booth RN  Outcome: Progressing  4/3/2025 2322 by Willie Booth RN  Outcome: Progressing     Problem: Discharge Planning  Goal: Discharge to home or other facility with appropriate resources  4/4/2025 0725 by Willie Booth RN  Outcome: Progressing  4/3/2025 2322 by Willie Booth RN  Outcome: Progressing     Problem: Respiratory  Goal: No signs of respiratory distress (eg. Use of accessory muscles. Peds grunting)  4/4/2025 0725 by Willie Booth RN  Outcome: Progressing  4/3/2025 2322 by Willie Booth RN  Outcome: Progressing  Goal: Wean oxygen to maintain O2 saturation per order/standard this shift  4/4/2025 0725 by Willie Booth RN  Outcome: Progressing  4/3/2025 2322 by Willie Booth RN  Outcome: Progressing  Goal: Clear secretions with interventions this shift  4/4/2025 0725 by Willie Booth RN  Outcome: Progressing  4/3/2025 2322 by Willie Booth RN  Outcome: Progressing  Goal: Minimize anxiety/maximize coping throughout shift  4/4/2025 0725 by Willie Booth RN  Outcome: Progressing  4/3/2025 2322 by Willie Booth RN  Outcome: Progressing  Goal: Minimal/no exertional discomfort or dyspnea this shift  4/4/2025 0725 by Willie Booth RN  Outcome: Progressing  4/3/2025 2322 by Willie Booth RN  Outcome: Progressing  Goal: Patent airway maintained this shift  4/4/2025 0725 by Willie Booth RN  Outcome: Progressing  4/3/2025 2322 by Willie Kabongo, RN  Outcome: Progressing  Goal:  Tolerate pulmonary toileting this shift  4/4/2025 0725 by Willie Booth RN  Outcome: Progressing  4/3/2025 2322 by Willie Booth RN  Outcome: Progressing  Goal: Verbalize decreased shortness of breath this shift  4/4/2025 0725 by Willie Booth RN  Outcome: Progressing  4/3/2025 2322 by Willie Booth RN  Outcome: Progressing  Goal: Increase self care and/or family involvement in next 24 hours  4/4/2025 0725 by Willie Booth RN  Outcome: Progressing  4/3/2025 2322 by Willie Booth RN  Outcome: Progressing     Problem: Infection related to problem list condition  Goal: Infection will resolve through treatment  4/4/2025 0725 by Willie Booth RN  Outcome: Progressing  4/3/2025 2322 by Willie Booth RN  Outcome: Progressing     Problem: Risk for Peripheral Neurovascular Dysfunction  Goal: Patient will not display a decrease in peripheral pulses or pallor. Patient will not report feelings of numbness and tingling (paresthesia).  4/4/2025 0725 by Willie Booth RN  Outcome: Progressing  4/3/2025 2322 by Willie Booth RN  Outcome: Progressing     Problem: Risk for Alcohol Withdrawal Syndrome  Goal: The patient will not experience auditory or visual hallucinations. The patient will be alert and oriented x 4. The patient will not exhibit increased irritability or hyperactivity.  4/4/2025 0725 by Willie Booth RN  Outcome: Progressing  4/3/2025 2322 by Willie Booth RN  Outcome: Progressing     Problem: Fall/Injury  Goal: Not fall by end of shift  4/4/2025 0725 by Willie Booth RN  Outcome: Progressing  4/3/2025 2322 by Willie Booth RN  Outcome: Progressing  Goal: Be free from injury by end of the shift  4/4/2025 0725 by Wlilie Booth RN  Outcome: Progressing  4/3/2025 2322 by Willie Booth RN  Outcome: Progressing  Goal: Verbalize understanding of personal risk factors for fall in the hospital  4/4/2025 0725 by Willie Booth RN  Outcome: Progressing  4/3/2025 2322 by Willie Booth RN  Outcome:  Progressing  Goal: Verbalize understanding of risk factor reduction measures to prevent injury from fall in the home  4/4/2025 0725 by Willie Booth RN  Outcome: Progressing  4/3/2025 2322 by Willie Booth RN  Outcome: Progressing  Goal: Use assistive devices by end of the shift  4/4/2025 0725 by Willie Booth RN  Outcome: Progressing  4/3/2025 2322 by Willie Booth RN  Outcome: Progressing  Goal: Pace activities to prevent fatigue by end of the shift  4/4/2025 0725 by Willie Booth RN  Outcome: Progressing  4/3/2025 2322 by Willie Booth RN  Outcome: Progressing     Problem: Pain  Goal: Takes deep breaths with improved pain control throughout the shift  4/4/2025 0725 by Willie Booth RN  Outcome: Progressing  4/3/2025 2322 by Willie Booth RN  Outcome: Progressing  Goal: Turns in bed with improved pain control throughout the shift  4/4/2025 0725 by Willie Booth RN  Outcome: Progressing  4/3/2025 2322 by Willie Booth RN  Outcome: Progressing  Goal: Walks with improved pain control throughout the shift  4/4/2025 0725 by Willie Booth RN  Outcome: Progressing  4/3/2025 2322 by Willie Booth RN  Outcome: Progressing  Goal: Performs ADL's with improved pain control throughout shift  4/4/2025 0725 by Willie Booth RN  Outcome: Progressing  4/3/2025 2322 by Willie Booth RN  Outcome: Progressing  Goal: Participates in PT with improved pain control throughout the shift  4/4/2025 0725 by Willie Booth RN  Outcome: Progressing  4/3/2025 2322 by Willie Booth RN  Outcome: Progressing  Goal: Free from opioid side effects throughout the shift  4/4/2025 0725 by Willie Booth RN  Outcome: Progressing  4/3/2025 2322 by Willie Booth RN  Outcome: Progressing  Goal: Free from acute confusion related to pain meds throughout the shift  4/4/2025 0725 by Willie Booth RN  Outcome: Progressing  4/3/2025 2322 by Willie Booth RN  Outcome: Progressing

## 2025-04-04 NOTE — PROGRESS NOTES
ASSESSMENT & PLAN:     LUE Cellulitis  -Given extent of edema/erythema CT of LUE was obtained. Findings consitent with cellulitis of dorsum of hand appreciated. No evidence of abscess, infectious myositis, or tenosynovitis was noted.    -ID following, recs appreciated.  Advise de-escalated to vancomycin.  To consider transition to doxycycline pending further improvement.  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   -Cont warm compresses/ibuprofen for now     Hx Gout  -Continue empiric coverage for flare. Uric acid level elevated, 10.4.   -Cont colchicine 0.6 mg BID      Essential Tremor  -On primidone 100 mg BID, will continue     ETOH Dependence  -Drinks 2-3 beers daily. Denies hx of withdrawal, and has not exhibited signs of withdrawal during hospitalization  -Thiamine/folate/MV     Severe COPD  -On 6L O2 at baseline.   -Scheduled/PRN nebs. As not in exacerbation will defer steroid administration     Ambulatory dysfunction  -PT/OT eval appreciated. [facility] 11. Care coordinators working on placement to SNF.      VTE Prophylaxis: Lovenox SubQ      Dre Vergara MD    SUBJECTIVE     Patient had no acute events overnight.  Feels like he is breathing well.  Denies any fevers or chills.  Further ROS was unremarkable.    OBJECTIVE:       Last Recorded Vitals:  Vitals:    04/03/25 2047 04/03/25 2115 04/04/25 0500 04/04/25 0815   BP: 119/71 118/66 100/69 118/58   BP Location: Left arm Right arm Left arm Right arm   Patient Position: Lying Lying  Sitting   Pulse: 79 78 77    Resp: 18 20 18 18   Temp: 36.5 °C (97.7 °F) 35 °C (95 °F) 35.7 °C (96.3 °F) 35.1 °C (95.2 °F)   TempSrc: Temporal  Temporal Temporal   SpO2: 97% 94% 96% 91%   Weight:       Height:           Last I/O:  I/O last 3 completed shifts:  In: 1180 (17.1 mL/kg) [P.O.:880; IV Piggyback:300]  Out: 800 (11.6 mL/kg) [Urine:800 (0.3  mL/kg/hr)]  Weight: 69.2 kg     Physical Exam:  General: Ill-appearing elderly male in mild distress  HEENT: Clear sclera, EOMI, trachea midline, moist mucous membranes  Respiratory: Equal chest rise, no retractions  Abdomen: Soft, nontender, nondistended  Extremities: LUE erythema and swelling from mid phalanges to forearm as per blow.   Neurological: Spontaneously moves all extremities, no dysarthria, cranial nerves grossly intact  Psychiatric: Appropriate mood and affect  Skin: Warm, dry      Inpatient Medications:  atenolol, 50 mg, oral, Daily  colchicine, 0.6 mg, oral, BID  enoxaparin, 40 mg, subcutaneous, q24h  tiotropium, 2 puff, inhalation, Daily   And  fluticasone furoate-vilanteroL, 1 puff, inhalation, Daily  folic acid, 1 mg, oral, Daily  [Held by provider] furosemide, 20 mg, oral, Daily before breakfast  ibuprofen, 600 mg, oral, BID  ipratropium-albuteroL, 3 mL, nebulization, q6h  lactobacillus acidophilus, 1 tablet, oral, BID  mirtazapine, 15 mg, oral, Nightly  multivitamin with minerals, 1 tablet, oral, Daily  polyethylene glycol, 17 g, oral, Daily  pravastatin, 40 mg, oral, Daily  [Held by provider] predniSONE, 5 mg, oral, Daily before breakfast  primidone, 100 mg/day, oral, BID  sennosides, 2 tablet, oral, Nightly  thiamine, 100 mg, oral, Daily  vancomycin, 1,250 mg, intravenous, q24h        PRN Medications  PRN medications: albuterol, HYDROmorphone, HYDROmorphone, ipratropium-albuteroL, LORazepam **OR** LORazepam **OR** LORazepam, vancomycin    Continuous Medications:  oxygen, , Last Rate: 6 L/min (04/03/25 0719)          LABS AND IMAGING:     Labs:  Results for orders placed or performed during the hospital encounter of 04/01/25 (from the past 24 hours)   Magnesium   Result Value Ref Range    Magnesium 1.82 1.60 - 2.40 mg/dL   Basic Metabolic Panel   Result Value Ref Range    Glucose 97 74 - 99 mg/dL    Sodium 140 136 - 145 mmol/L    Potassium 4.3 3.5 - 5.3 mmol/L    Chloride 101 98 - 107 mmol/L     Bicarbonate 35 (H) 21 - 32 mmol/L    Anion Gap 8 (L) 10 - 20 mmol/L    Urea Nitrogen 12 6 - 23 mg/dL    Creatinine 0.94 0.50 - 1.30 mg/dL    eGFR 82 >60 mL/min/1.73m*2    Calcium 8.4 (L) 8.6 - 10.3 mg/dL   CBC and Auto Differential   Result Value Ref Range    WBC 5.3 4.4 - 11.3 x10*3/uL    nRBC 0.0 0.0 - 0.0 /100 WBCs    RBC 3.23 (L) 4.50 - 5.90 x10*6/uL    Hemoglobin 10.2 (L) 13.5 - 17.5 g/dL    Hematocrit 32.6 (L) 41.0 - 52.0 %     (H) 80 - 100 fL    MCH 31.6 26.0 - 34.0 pg    MCHC 31.3 (L) 32.0 - 36.0 g/dL    RDW 11.9 11.5 - 14.5 %    Platelets 205 150 - 450 x10*3/uL    Neutrophils % 64.3 40.0 - 80.0 %    Immature Granulocytes %, Automated 0.4 0.0 - 0.9 %    Lymphocytes % 16.9 13.0 - 44.0 %    Monocytes % 11.4 2.0 - 10.0 %    Eosinophils % 6.4 0.0 - 6.0 %    Basophils % 0.6 0.0 - 2.0 %    Neutrophils Absolute 3.44 1.60 - 5.50 x10*3/uL    Immature Granulocytes Absolute, Automated 0.02 0.00 - 0.50 x10*3/uL    Lymphocytes Absolute 0.90 0.80 - 3.00 x10*3/uL    Monocytes Absolute 0.61 0.05 - 0.80 x10*3/uL    Eosinophils Absolute 0.34 0.00 - 0.40 x10*3/uL    Basophils Absolute 0.03 0.00 - 0.10 x10*3/uL   Vancomycin   Result Value Ref Range    Vancomycin 14.8 5.0 - 20.0 ug/mL        Imaging:  ECG 12 lead  Normal sinus rhythm  Right bundle branch block  Abnormal ECG  When compared with ECG of 03-JUL-2022 21:24,  PA interval has decreased  See ED provider note for full interpretation and clinical correlation  Confirmed by Ayah Goins ([id number]) on 4/3/2025 11:03:22 AM

## 2025-04-05 LAB
ANION GAP SERPL CALC-SCNC: 10 MMOL/L (ref 10–20)
BACTERIA SPEC CULT: NORMAL
BASOPHILS # BLD AUTO: 0.04 X10*3/UL (ref 0–0.1)
BASOPHILS NFR BLD AUTO: 0.7 %
BUN SERPL-MCNC: 11 MG/DL (ref 6–23)
CALCIUM SERPL-MCNC: 8.3 MG/DL (ref 8.6–10.3)
CHLORIDE SERPL-SCNC: 102 MMOL/L (ref 98–107)
CO2 SERPL-SCNC: 32 MMOL/L (ref 21–32)
CREAT SERPL-MCNC: 0.95 MG/DL (ref 0.5–1.3)
EGFRCR SERPLBLD CKD-EPI 2021: 81 ML/MIN/1.73M*2
EOSINOPHIL # BLD AUTO: 0.4 X10*3/UL (ref 0–0.4)
EOSINOPHIL NFR BLD AUTO: 7 %
ERYTHROCYTE [DISTWIDTH] IN BLOOD BY AUTOMATED COUNT: 12 % (ref 11.5–14.5)
GLUCOSE SERPL-MCNC: 113 MG/DL (ref 74–99)
GRAM STN SPEC: NORMAL
GRAM STN SPEC: NORMAL
HCT VFR BLD AUTO: 33.7 % (ref 41–52)
HGB BLD-MCNC: 10.4 G/DL (ref 13.5–17.5)
IMM GRANULOCYTES # BLD AUTO: 0.02 X10*3/UL (ref 0–0.5)
IMM GRANULOCYTES NFR BLD AUTO: 0.4 % (ref 0–0.9)
LYMPHOCYTES # BLD AUTO: 0.92 X10*3/UL (ref 0.8–3)
LYMPHOCYTES NFR BLD AUTO: 16.2 %
MAGNESIUM SERPL-MCNC: 1.67 MG/DL (ref 1.6–2.4)
MCH RBC QN AUTO: 31.6 PG (ref 26–34)
MCHC RBC AUTO-ENTMCNC: 30.9 G/DL (ref 32–36)
MCV RBC AUTO: 102 FL (ref 80–100)
MONOCYTES # BLD AUTO: 0.57 X10*3/UL (ref 0.05–0.8)
MONOCYTES NFR BLD AUTO: 10 %
NEUTROPHILS # BLD AUTO: 3.74 X10*3/UL (ref 1.6–5.5)
NEUTROPHILS NFR BLD AUTO: 65.7 %
NRBC BLD-RTO: 0 /100 WBCS (ref 0–0)
PLATELET # BLD AUTO: 212 X10*3/UL (ref 150–450)
POTASSIUM SERPL-SCNC: 4 MMOL/L (ref 3.5–5.3)
RBC # BLD AUTO: 3.29 X10*6/UL (ref 4.5–5.9)
SODIUM SERPL-SCNC: 140 MMOL/L (ref 136–145)
VANCOMYCIN SERPL-MCNC: 18.8 UG/ML (ref 5–20)
WBC # BLD AUTO: 5.7 X10*3/UL (ref 4.4–11.3)

## 2025-04-05 PROCEDURE — 2500000001 HC RX 250 WO HCPCS SELF ADMINISTERED DRUGS (ALT 637 FOR MEDICARE OP): Performed by: INTERNAL MEDICINE

## 2025-04-05 PROCEDURE — 36415 COLL VENOUS BLD VENIPUNCTURE: CPT | Performed by: STUDENT IN AN ORGANIZED HEALTH CARE EDUCATION/TRAINING PROGRAM

## 2025-04-05 PROCEDURE — 2500000002 HC RX 250 W HCPCS SELF ADMINISTERED DRUGS (ALT 637 FOR MEDICARE OP, ALT 636 FOR OP/ED): Performed by: STUDENT IN AN ORGANIZED HEALTH CARE EDUCATION/TRAINING PROGRAM

## 2025-04-05 PROCEDURE — 97535 SELF CARE MNGMENT TRAINING: CPT | Mod: GO,CO

## 2025-04-05 PROCEDURE — 99232 SBSQ HOSP IP/OBS MODERATE 35: CPT | Performed by: STUDENT IN AN ORGANIZED HEALTH CARE EDUCATION/TRAINING PROGRAM

## 2025-04-05 PROCEDURE — 1100000001 HC PRIVATE ROOM DAILY

## 2025-04-05 PROCEDURE — 2500000005 HC RX 250 GENERAL PHARMACY W/O HCPCS: Performed by: STUDENT IN AN ORGANIZED HEALTH CARE EDUCATION/TRAINING PROGRAM

## 2025-04-05 PROCEDURE — 83735 ASSAY OF MAGNESIUM: CPT | Performed by: STUDENT IN AN ORGANIZED HEALTH CARE EDUCATION/TRAINING PROGRAM

## 2025-04-05 PROCEDURE — 80048 BASIC METABOLIC PNL TOTAL CA: CPT | Performed by: STUDENT IN AN ORGANIZED HEALTH CARE EDUCATION/TRAINING PROGRAM

## 2025-04-05 PROCEDURE — 94640 AIRWAY INHALATION TREATMENT: CPT

## 2025-04-05 PROCEDURE — 2500000001 HC RX 250 WO HCPCS SELF ADMINISTERED DRUGS (ALT 637 FOR MEDICARE OP): Performed by: STUDENT IN AN ORGANIZED HEALTH CARE EDUCATION/TRAINING PROGRAM

## 2025-04-05 PROCEDURE — 2500000004 HC RX 250 GENERAL PHARMACY W/ HCPCS (ALT 636 FOR OP/ED)

## 2025-04-05 PROCEDURE — 2500000004 HC RX 250 GENERAL PHARMACY W/ HCPCS (ALT 636 FOR OP/ED): Performed by: STUDENT IN AN ORGANIZED HEALTH CARE EDUCATION/TRAINING PROGRAM

## 2025-04-05 PROCEDURE — 85025 COMPLETE CBC W/AUTO DIFF WBC: CPT | Performed by: STUDENT IN AN ORGANIZED HEALTH CARE EDUCATION/TRAINING PROGRAM

## 2025-04-05 PROCEDURE — 80202 ASSAY OF VANCOMYCIN: CPT

## 2025-04-05 RX ADMIN — MIRTAZAPINE 15 MG: 15 TABLET, FILM COATED ORAL at 20:35

## 2025-04-05 RX ADMIN — IBUPROFEN 600 MG: 600 TABLET, FILM COATED ORAL at 20:35

## 2025-04-05 RX ADMIN — Medication 1 TABLET: at 08:33

## 2025-04-05 RX ADMIN — PRAVASTATIN SODIUM 40 MG: 20 TABLET ORAL at 08:33

## 2025-04-05 RX ADMIN — FOLIC ACID 1 MG: 1 TABLET ORAL at 08:33

## 2025-04-05 RX ADMIN — Medication 6 L/MIN: at 19:40

## 2025-04-05 RX ADMIN — ATENOLOL 50 MG: 50 TABLET ORAL at 08:33

## 2025-04-05 RX ADMIN — TIOTROPIUM BROMIDE INHALATION SPRAY 2 PUFF: 3.12 SPRAY, METERED RESPIRATORY (INHALATION) at 08:32

## 2025-04-05 RX ADMIN — IBUPROFEN 600 MG: 600 TABLET, FILM COATED ORAL at 08:33

## 2025-04-05 RX ADMIN — Medication 100 MG: at 08:33

## 2025-04-05 RX ADMIN — Medication 1 TABLET: at 20:34

## 2025-04-05 RX ADMIN — PRIMIDONE 50 MG: 50 TABLET ORAL at 08:34

## 2025-04-05 RX ADMIN — IPRATROPIUM BROMIDE AND ALBUTEROL SULFATE 3 ML: 2.5; .5 SOLUTION RESPIRATORY (INHALATION) at 01:21

## 2025-04-05 RX ADMIN — IPRATROPIUM BROMIDE AND ALBUTEROL SULFATE 3 ML: 2.5; .5 SOLUTION RESPIRATORY (INHALATION) at 19:40

## 2025-04-05 RX ADMIN — ENOXAPARIN SODIUM 40 MG: 40 INJECTION SUBCUTANEOUS at 20:36

## 2025-04-05 RX ADMIN — COLCHICINE 0.6 MG: 0.6 TABLET, FILM COATED ORAL at 20:35

## 2025-04-05 RX ADMIN — FLUTICASONE FUROATE AND VILANTEROL TRIFENATATE 1 PUFF: 100; 25 POWDER RESPIRATORY (INHALATION) at 08:33

## 2025-04-05 RX ADMIN — Medication 6 L/MIN: at 07:49

## 2025-04-05 RX ADMIN — STANDARDIZED SENNA CONCENTRATE 17.2 MG: 8.6 TABLET ORAL at 20:34

## 2025-04-05 RX ADMIN — VANCOMYCIN HYDROCHLORIDE 1250 MG: 1.25 INJECTION, POWDER, LYOPHILIZED, FOR SOLUTION INTRAVENOUS at 18:26

## 2025-04-05 RX ADMIN — IPRATROPIUM BROMIDE AND ALBUTEROL SULFATE 3 ML: 2.5; .5 SOLUTION RESPIRATORY (INHALATION) at 13:47

## 2025-04-05 RX ADMIN — PRIMIDONE 50 MG: 50 TABLET ORAL at 20:34

## 2025-04-05 RX ADMIN — COLCHICINE 0.6 MG: 0.6 TABLET, FILM COATED ORAL at 08:33

## 2025-04-05 RX ADMIN — IPRATROPIUM BROMIDE AND ALBUTEROL SULFATE 3 ML: 2.5; .5 SOLUTION RESPIRATORY (INHALATION) at 07:46

## 2025-04-05 ASSESSMENT — COGNITIVE AND FUNCTIONAL STATUS - GENERAL
MOVING TO AND FROM BED TO CHAIR: A LITTLE
HELP NEEDED FOR BATHING: A LOT
DAILY ACTIVITIY SCORE: 16
DAILY ACTIVITIY SCORE: 18
DRESSING REGULAR LOWER BODY CLOTHING: A LITTLE
MOBILITY SCORE: 19
DRESSING REGULAR UPPER BODY CLOTHING: A LITTLE
TOILETING: A LOT
HELP NEEDED FOR BATHING: A LITTLE
PERSONAL GROOMING: A LITTLE
STANDING UP FROM CHAIR USING ARMS: A LITTLE
DRESSING REGULAR UPPER BODY CLOTHING: A LOT
WALKING IN HOSPITAL ROOM: A LITTLE
CLIMB 3 TO 5 STEPS WITH RAILING: A LOT
DRESSING REGULAR LOWER BODY CLOTHING: A LOT
PERSONAL GROOMING: A LITTLE
TOILETING: A LITTLE

## 2025-04-05 ASSESSMENT — PAIN SCALES - GENERAL
PAINLEVEL_OUTOF10: 0 - NO PAIN
PAINLEVEL_OUTOF10: 3
PAINLEVEL_OUTOF10: 0 - NO PAIN

## 2025-04-05 ASSESSMENT — PAIN SCALES - WONG BAKER: WONGBAKER_NUMERICALRESPONSE: HURTS WHOLE LOT

## 2025-04-05 ASSESSMENT — ACTIVITIES OF DAILY LIVING (ADL): HOME_MANAGEMENT_TIME_ENTRY: 36

## 2025-04-05 ASSESSMENT — PAIN - FUNCTIONAL ASSESSMENT: PAIN_FUNCTIONAL_ASSESSMENT: 0-10

## 2025-04-05 NOTE — PROGRESS NOTES
ASSESSMENT & PLAN:     LUE Cellulitis  -Given extent of edema/erythema CT of LUE was obtained. Findings consitent with cellulitis of dorsum of hand appreciated. No evidence of abscess, infectious myositis, or tenosynovitis was noted.    -ID following, recs appreciated.  Continued Vancomycin advised.  To consider transition to doxycycline pending further improvement.  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   -Cont warm compresses/ibuprofen for now     Hx Gout  -Continue empiric coverage for flare. Uric acid level elevated, 10.4.   -Cont colchicine 0.6 mg BID      Essential Tremor  -On primidone 100 mg BID, will continue     ETOH Dependence  -Drinks 2-3 beers daily. Denies hx of withdrawal, and has not exhibited signs of withdrawal during hospitalization  -Thiamine/folate/MV     Severe COPD  -On 6L O2 at baseline.   -Scheduled/PRN nebs. As not in exacerbation will defer steroid administration     Ambulatory dysfunction  -PT/OT eval appreciated. [facility] 11. Care coordinators working on placement to SNF.      VTE Prophylaxis: Lovenox SubQ      Dre Vergara MD    SUBJECTIVE     Patient had no acute events overnight.  Endorsing and discomfort.  Further ROS was unremarkable.    OBJECTIVE:       Last Recorded Vitals:  Vitals:    04/05/25 0149 04/05/25 0749 04/05/25 0835 04/05/25 1117   BP: 115/60  121/71    BP Location:       Patient Position:   Sitting    Pulse: 87  104 89   Resp:   20    Temp: 35.5 °C (95.9 °F)  36 °C (96.8 °F)    TempSrc:       SpO2: 92% 92% (!) 89% (!) 86%   Weight:       Height:           Last I/O:  I/O last 3 completed shifts:  In: 450 (6.5 mL/kg) [IV Piggyback:450]  Out: - (0 mL/kg)   Weight: 69.2 kg     Physical Exam:  General: Ill-appearing elderly male in mild distress  HEENT: Clear sclera, EOMI, trachea midline, moist mucous membranes  Respiratory: Equal chest rise, no  retractions  Abdomen: Soft, nontender, nondistended  Extremities: LUE erythema and swelling from mid phalanges to forearm as per blow.   Neurological: Spontaneously moves all extremities, no dysarthria, cranial nerves grossly intact  Psychiatric: Appropriate mood and affect  Skin: Warm, dry      Inpatient Medications:  atenolol, 50 mg, oral, Daily  colchicine, 0.6 mg, oral, BID  enoxaparin, 40 mg, subcutaneous, q24h  tiotropium, 2 puff, inhalation, Daily   And  fluticasone furoate-vilanteroL, 1 puff, inhalation, Daily  folic acid, 1 mg, oral, Daily  [Held by provider] furosemide, 20 mg, oral, Daily before breakfast  ibuprofen, 600 mg, oral, BID  ipratropium-albuteroL, 3 mL, nebulization, q6h  lactobacillus acidophilus, 1 tablet, oral, BID  mirtazapine, 15 mg, oral, Nightly  multivitamin with minerals, 1 tablet, oral, Daily  polyethylene glycol, 17 g, oral, Daily  pravastatin, 40 mg, oral, Daily  [Held by provider] predniSONE, 5 mg, oral, Daily before breakfast  primidone, 100 mg/day, oral, BID  sennosides, 2 tablet, oral, Nightly  thiamine, 100 mg, oral, Daily  vancomycin, 1,250 mg, intravenous, q24h        PRN Medications  PRN medications: albuterol, HYDROmorphone, HYDROmorphone, ipratropium-albuteroL, LORazepam **OR** LORazepam **OR** LORazepam, vancomycin    Continuous Medications:  oxygen, , Last Rate: 6 L/min (04/05/25 0749)          LABS AND IMAGING:     Labs:  Results for orders placed or performed during the hospital encounter of 04/01/25 (from the past 24 hours)   Magnesium   Result Value Ref Range    Magnesium 1.67 1.60 - 2.40 mg/dL   Basic Metabolic Panel   Result Value Ref Range    Glucose 113 (H) 74 - 99 mg/dL    Sodium 140 136 - 145 mmol/L    Potassium 4.0 3.5 - 5.3 mmol/L    Chloride 102 98 - 107 mmol/L    Bicarbonate 32 21 - 32 mmol/L    Anion Gap 10 10 - 20 mmol/L    Urea Nitrogen 11 6 - 23 mg/dL    Creatinine 0.95 0.50 - 1.30 mg/dL    eGFR 81 >60 mL/min/1.73m*2    Calcium 8.3 (L) 8.6 - 10.3 mg/dL    CBC and Auto Differential   Result Value Ref Range    WBC 5.7 4.4 - 11.3 x10*3/uL    nRBC 0.0 0.0 - 0.0 /100 WBCs    RBC 3.29 (L) 4.50 - 5.90 x10*6/uL    Hemoglobin 10.4 (L) 13.5 - 17.5 g/dL    Hematocrit 33.7 (L) 41.0 - 52.0 %     (H) 80 - 100 fL    MCH 31.6 26.0 - 34.0 pg    MCHC 30.9 (L) 32.0 - 36.0 g/dL    RDW 12.0 11.5 - 14.5 %    Platelets 212 150 - 450 x10*3/uL    Neutrophils % 65.7 40.0 - 80.0 %    Immature Granulocytes %, Automated 0.4 0.0 - 0.9 %    Lymphocytes % 16.2 13.0 - 44.0 %    Monocytes % 10.0 2.0 - 10.0 %    Eosinophils % 7.0 0.0 - 6.0 %    Basophils % 0.7 0.0 - 2.0 %    Neutrophils Absolute 3.74 1.60 - 5.50 x10*3/uL    Immature Granulocytes Absolute, Automated 0.02 0.00 - 0.50 x10*3/uL    Lymphocytes Absolute 0.92 0.80 - 3.00 x10*3/uL    Monocytes Absolute 0.57 0.05 - 0.80 x10*3/uL    Eosinophils Absolute 0.40 0.00 - 0.40 x10*3/uL    Basophils Absolute 0.04 0.00 - 0.10 x10*3/uL   Vancomycin   Result Value Ref Range    Vancomycin 18.8 5.0 - 20.0 ug/mL        Imaging:  ECG 12 lead  Normal sinus rhythm  Right bundle branch block  Abnormal ECG  When compared with ECG of 03-JUL-2022 21:24,  [place] interval has decreased  See ED provider note for full interpretation and clinical correlation  Confirmed by Ayah Goins ([id number]) on 4/3/2025 11:03:22 AM

## 2025-04-05 NOTE — CARE PLAN
The patient's goals for the shift include      The clinical goals for the shift include Patient will remain safe and HD stable during this shift.    Problem: Chronic Conditions and Co-morbidities  Goal: Patient's chronic conditions and co-morbidity symptoms are monitored and maintained or improved  4/5/2025 0650 by Willie Booth RN  Outcome: Progressing  4/4/2025 2323 by Willie Booth RN  Outcome: Progressing  4/4/2025 2323 by Willie Booth RN  Outcome: Progressing     Problem: Nutrition  Goal: Nutrient intake appropriate for maintaining nutritional needs  4/5/2025 0650 by Willie Booth RN  Outcome: Progressing  4/4/2025 2323 by Willie Booth RN  Outcome: Progressing  4/4/2025 2323 by Willie Booth RN  Outcome: Progressing     Problem: Discharge Planning  Goal: Discharge to home or other facility with appropriate resources  4/5/2025 0650 by Willie Booth RN  Outcome: Progressing  4/4/2025 2323 by Willie Booth RN  Outcome: Progressing  4/4/2025 2323 by Willie Booth RN  Outcome: Progressing     Problem: Respiratory  Goal: No signs of respiratory distress (eg. Use of accessory muscles. Peds grunting)  4/5/2025 0650 by Willie Booth RN  Outcome: Progressing  4/4/2025 2323 by Willie Booth RN  Outcome: Progressing  4/4/2025 2323 by Willie Booth RN  Outcome: Progressing  Goal: Wean oxygen to maintain O2 saturation per order/standard this shift  4/5/2025 0650 by Willie Booth RN  Outcome: Progressing  4/4/2025 2323 by Willie Booth RN  Outcome: Progressing  4/4/2025 2323 by Willie Booth RN  Outcome: Progressing  Goal: Clear secretions with interventions this shift  4/5/2025 0650 by Willie Booth RN  Outcome: Progressing  4/4/2025 2323 by Willie Booth RN  Outcome: Progressing  4/4/2025 2323 by Willie Booth RN  Outcome: Progressing  Goal: Minimize anxiety/maximize coping throughout shift  4/5/2025 0650 by Willie Kabongo, RN  Outcome: Progressing  4/4/2025 2323 by Willie Booth, RN  Outcome:  Progressing  4/4/2025 2323 by Willie Booth RN  Outcome: Progressing  Goal: Minimal/no exertional discomfort or dyspnea this shift  4/5/2025 0650 by Willie Booth RN  Outcome: Progressing  4/4/2025 2323 by Willie Booth RN  Outcome: Progressing  4/4/2025 2323 by Willie Booth RN  Outcome: Progressing  Goal: Patent airway maintained this shift  4/5/2025 0650 by Willie Booth RN  Outcome: Progressing  4/4/2025 2323 by Willie Booth RN  Outcome: Progressing  4/4/2025 2323 by Willie Booth, GWENDOLYN  Outcome: Progressing  Goal: Tolerate pulmonary toileting this shift  4/5/2025 0650 by Willie Booth RN  Outcome: Progressing  4/4/2025 2323 by Willie Booth RN  Outcome: Progressing  4/4/2025 2323 by Willie Booth RN  Outcome: Progressing  Goal: Verbalize decreased shortness of breath this shift  4/5/2025 0650 by Willie Booth RN  Outcome: Progressing  4/4/2025 2323 by Willie Booth RN  Outcome: Progressing  4/4/2025 2323 by Willie Booth RN  Outcome: Progressing  Goal: Increase self care and/or family involvement in next 24 hours  4/5/2025 0650 by Willie Booth RN  Outcome: Progressing  4/4/2025 2323 by Willie Booth RN  Outcome: Progressing  4/4/2025 2323 by Willie Booth RN  Outcome: Progressing     Problem: Infection related to problem list condition  Goal: Infection will resolve through treatment  4/5/2025 0650 by Willie Booth RN  Outcome: Progressing  4/4/2025 2323 by Willie Booth RN  Outcome: Progressing  4/4/2025 2323 by Willie Booth RN  Outcome: Progressing     Problem: Skin  Goal: Participates in plan/prevention/treatment measures  4/5/2025 0650 by Willie Booth RN  Outcome: Progressing  4/4/2025 2323 by Willie Booth RN  Outcome: Progressing  Flowsheets (Taken 4/4/2025 2323)  Participates in plan/prevention/treatment measures:   Discuss with provider PT/OT consult   Elevate heels   Increase activity/out of bed for meals  4/4/2025 2323 by Willie Booth RN  Outcome: Progressing  Flowsheets (Taken  4/4/2025 2323)  Participates in plan/prevention/treatment measures:   Discuss with provider PT/OT consult   Elevate heels   Increase activity/out of bed for meals  Goal: Prevent/manage excess moisture  4/5/2025 0650 by Willie Booth RN  Outcome: Progressing  4/4/2025 2323 by Willie Booth RN  Outcome: Progressing  Flowsheets (Taken 4/4/2025 2323)  Prevent/manage excess moisture:   Cleanse incontinence/protect with barrier cream   Moisturize dry skin   Use wicking fabric (obtain order)   Follow provider orders for dressing changes   Monitor for/manage infection if present  4/4/2025 2323 by Willie Booth RN  Outcome: Progressing  Flowsheets (Taken 4/4/2025 2323)  Prevent/manage excess moisture:   Cleanse incontinence/protect with barrier cream   Moisturize dry skin   Use wicking fabric (obtain order)   Follow provider orders for dressing changes   Monitor for/manage infection if present  Goal: Prevent/minimize sheer/friction injuries  4/5/2025 0650 by Willie Booth RN  Outcome: Progressing  4/4/2025 2323 by Willie Booth RN  Outcome: Progressing  Flowsheets (Taken 4/4/2025 2323)  Prevent/minimize sheer/friction injuries:   Complete micro-shifts as needed if patient unable. Adjust patient position to relieve pressure points, not a full turn   Increase activity/out of bed for meals   Use pull sheet   HOB 30 degrees or less   Turn/reposition every 2 hours/use positioning/transfer devices   Utilize specialty bed per algorithm  4/4/2025 2323 by Willie Booth RN  Outcome: Progressing  Flowsheets (Taken 4/4/2025 2323)  Prevent/minimize sheer/friction injuries:   Complete micro-shifts as needed if patient unable. Adjust patient position to relieve pressure points, not a full turn   Increase activity/out of bed for meals   Use pull sheet   HOB 30 degrees or less   Turn/reposition every 2 hours/use positioning/transfer devices   Utilize specialty bed per algorithm  Goal: Promote/optimize nutrition  4/5/2025 0650 by Willie  GWENDOLYN Booth  Outcome: Progressing  4/4/2025 2323 by Willie Booth RN  Outcome: Progressing  Flowsheets (Taken 4/4/2025 2323)  Promote/optimize nutrition:   Assist with feeding   Monitor/record intake including meals   Consume > 50% meals/supplements   Offer water/supplements/favorite foods   Discuss with provider if NPO > 2 days   Reassess MST if dietician not consulted  4/4/2025 2323 by Willie Booth RN  Outcome: Progressing  Flowsheets (Taken 4/4/2025 2323)  Promote/optimize nutrition:   Assist with feeding   Monitor/record intake including meals   Consume > 50% meals/supplements   Offer water/supplements/favorite foods   Discuss with provider if NPO > 2 days   Reassess MST if dietician not consulted  Goal: Promote skin healing  4/5/2025 0650 by Willie Booth RN  Outcome: Progressing  4/4/2025 2323 by Willie Booth RN  Outcome: Progressing  Flowsheets (Taken 4/4/2025 2323)  Promote skin healing:   Assess skin/pad under line(s)/device(s)   Protective dressings over bony prominences   Turn/reposition every 2 hours/use positioning/transfer devices   Ensure correct size (line/device) and apply per  instructions   Rotate device position/do not position patient on device  4/4/2025 2323 by Willie Booth RN  Outcome: Progressing  Flowsheets (Taken 4/4/2025 2323)  Promote skin healing:   Assess skin/pad under line(s)/device(s)   Protective dressings over bony prominences   Turn/reposition every 2 hours/use positioning/transfer devices   Ensure correct size (line/device) and apply per  instructions   Rotate device position/do not position patient on device  Goal: Decreased wound size/increased tissue granulation at next dressing change  4/5/2025 0650 by Willie Booth RN  Outcome: Progressing  4/4/2025 2323 by Willie Booth RN  Outcome: Progressing  Flowsheets (Taken 4/4/2025 2323)  Decreased wound size/increased tissue granulation at next dressing change:   Promote sleep for wound healing    Utilize specialty bed per algorithm   Protective dressings over bony prominences  4/4/2025 2323 by Willie Booth, GWENDOLYN  Outcome: Progressing  Flowsheets (Taken 4/4/2025 2323)  Decreased wound size/increased tissue granulation at next dressing change:   Promote sleep for wound healing   Utilize specialty bed per algorithm   Protective dressings over bony prominences

## 2025-04-05 NOTE — CARE PLAN
Problem: Chronic Conditions and Co-morbidities  Goal: Patient's chronic conditions and co-morbidity symptoms are monitored and maintained or improved  Outcome: Progressing     Problem: Nutrition  Goal: Nutrient intake appropriate for maintaining nutritional needs  Outcome: Progressing     Problem: Respiratory  Goal: No signs of respiratory distress (eg. Use of accessory muscles. Peds grunting)  Outcome: Progressing  Goal: Wean oxygen to maintain O2 saturation per order/standard this shift  Outcome: Progressing  Goal: Clear secretions with interventions this shift  Outcome: Progressing  Goal: Minimize anxiety/maximize coping throughout shift  Outcome: Progressing  Goal: Minimal/no exertional discomfort or dyspnea this shift  Outcome: Progressing  Goal: Patent airway maintained this shift  Outcome: Progressing  Goal: Tolerate pulmonary toileting this shift  Outcome: Progressing  Goal: Verbalize decreased shortness of breath this shift  Outcome: Progressing  Goal: Increase self care and/or family involvement in next 24 hours  Outcome: Progressing     Problem: Infection related to problem list condition  Goal: Infection will resolve through treatment  Outcome: Progressing     Problem: Skin  Goal: Participates in plan/prevention/treatment measures  Outcome: Progressing  Goal: Prevent/manage excess moisture  Outcome: Progressing  Goal: Prevent/minimize sheer/friction injuries  Outcome: Progressing  Goal: Promote/optimize nutrition  Outcome: Progressing  Goal: Promote skin healing  Outcome: Progressing  Goal: Decreased wound size/increased tissue granulation at next dressing change  Outcome: Progressing     Problem: Risk for Peripheral Neurovascular Dysfunction  Goal: Patient will not display a decrease in peripheral pulses or pallor. Patient will not report feelings of numbness and tingling (paresthesia).  Outcome: Progressing     Problem: Fall/Injury  Goal: Not fall by end of shift  Outcome: Progressing  Goal: Be free  from injury by end of the shift  Outcome: Progressing  Goal: Verbalize understanding of personal risk factors for fall in the hospital  Outcome: Progressing  Goal: Verbalize understanding of risk factor reduction measures to prevent injury from fall in the home  Outcome: Progressing  Goal: Use assistive devices by end of the shift  Outcome: Progressing  Goal: Pace activities to prevent fatigue by end of the shift  Outcome: Progressing     Problem: Pain  Goal: Takes deep breaths with improved pain control throughout the shift  Outcome: Progressing  Goal: Turns in bed with improved pain control throughout the shift  Outcome: Progressing  Goal: Walks with improved pain control throughout the shift  Outcome: Progressing  Goal: Performs ADL's with improved pain control throughout shift  Outcome: Progressing  Goal: Participates in PT with improved pain control throughout the shift  Outcome: Progressing  Goal: Free from opioid side effects throughout the shift  Outcome: Progressing  Goal: Free from acute confusion related to pain meds throughout the shift  Outcome: Progressing     Problem: Discharge Planning  Goal: Discharge to home or other facility with appropriate resources  Outcome: Progressing     Problem: Risk for Alcohol Withdrawal Syndrome  Goal: The patient will not experience auditory or visual hallucinations. The patient will be alert and oriented x 4. The patient will not exhibit increased irritability or hyperactivity.  Outcome: Progressing

## 2025-04-05 NOTE — PROGRESS NOTES
Occupational Therapy    OT Treatment    Patient Name: Rodrigo Conte  MRN: [id number]  Department: [facility]  Room: [address] [place] [address]  Today's Date: 4/5/2025  Time Calculation  Start Time: 1117  Stop Time: 1153  Time Calculation (min): 36 min      Assessment:  OT Assessment:  (pt presents w/ impaired balance, decrease Ind w/ ADL, transfer, and mobility. Pt will benefit from con't OT to address deficts.)  End of Session Communication: Bedside nurse  End of Session Patient Position: Alarm on, Up in chair (6L NC intact, call light and all needs within reach, daughter in room)     Plan:  Treatment Interventions: ADL retraining, Functional transfer training, Endurance training, Patient/family training  OT Frequency: 2 times per week  OT Discharge Recommendations: Moderate intensity level of continued care  OT Recommended Transfer Status: Moderate assist  OT - OK to Discharge: Yes  Treatment Interventions: ADL retraining, Functional transfer training, Endurance training, Patient/family training    Subjective   Previous Visit Info:  OT Last Visit  OT Received On: 04/05/25  General:  General  Prior to Session Communication: Bedside nurse  Patient Position Received: Up in chair, Alarm on  Preferred Learning Style: auditory, verbal  General Comment:  (pt agreeable to therapy, presented with tremors throughout functional activity, Adl's and transfer.)  Precautions:  Medical Precautions: Fall precautions, Oxygen therapy device and L/min  Precautions Comment: pt on 6L NC,   Vitals: SpO2 decreased to 86% after functional act, SpO2 96% w/ seated rest and PL breathing      Date/Time Vitals Session Patient Position Pulse Resp SpO2 BP MAP (mmHg)    04/05/25 1117 --  --  89  --  86 %  --  --           Pain:  Pain Assessment  Pain Assessment: 0-10  0-10 (Numeric) Pain Score: 3  Pain Location: Hand  Pain Orientation: Left  Multiple Pain Sites: Two  Pain 2  Pain Score 2: 0 - No pain  [place]-Al FACES Pain Rating 2: Hurts whole lot  Pain Type 2: Acute pain  (during trunk flexion for LB dressing)  Pain Location 2: Abdomen  Pain Orientation 2: Left  Pain Interventions 2: Repositioned  Response to Interventions 2: Relief    Objective    Cognition:  Cognition  Overall Cognitive Status: Within Functional Limits    Activities of Daily Living:    LE Dressing  LE Dressing:  (pt donned pants seated at recliner, 1rst attempt pt demo sharp 9/10 abdominal pain w/ trunk flexion, 2nd attempt w/ reacher, pt benefited from Min A to thread pants using AE, Pt able to pull pants over hips in stance w/ unilateral support to fww, CGA for balance/safety)    Functional Standing Tolerance:  Time: >5min  Activity: oral/personal care  Functional Standing Tolerance Comments:  (demo fair(-) standing balance/ tremors during task at sinkside, pt unsteady however no LOB in stance noted, pt able to maintain balance w/o UE support to walker 75% of the time when pt brushed dentures/completed hand hygiene, provided CGA for safety balance.)    Transfers:    Transfer 1  Technique 1: Sit to stand, Stand to sit  Transfer Device 1: Walker, Gait belt  Transfer Level of Assistance 1: Contact guard  Trials/Comments 1:  (completed at recliner, provided vc for hand placement, pt state leg weakness with first sit>stand, second attempt pt state he feels fine, demo fair(-) balance, slight retro lean, pt able to correct self.)    Functional Mobility:  Functional Mobility  Functional Mobility Performed:  (pt completed household distances during Adl task w/ fww, gairbelt donned, assist w/ 6L NC portable tank, CGA/VC for safety. Pt demo increase tremors during mobility.)    Outcome Measures:[facility] Daily [facility]  Putting on and taking off regular lower body clothing: A little  Bathing (including washing, rinsing, drying): A lot  Putting on and taking off regular upper body clothing: A lot  Toileting, which includes using toilet, bedpan or urinal: A lot  Taking care of personal grooming such as brushing teeth: A  little  Eating Meals: None  Daily Activity - Total Score: 16    Education Documentation  ADL Training, taught by STU Bennett at 4/5/2025 12:47 PM.  Learner: Family, Patient  Readiness: Acceptance  Method: Explanation, Teach-back, Handout, Demonstration  Response: Verbalizes Understanding, Needs Reinforcement, Demonstrated Understanding    EDUCATION:  Education  Individual(s) Educated: Patient, daughter  Education Provided: Fall precautons, Joint protection and energy conservation  Patient Response to Education: Patient/Caregiver Verbalized Understanding of Information  Education Comment:  (provided/ reviewed handout on ECS, discuss on fall risk/ prevention and pulmonary exercises to increase Ind w/ ADL's activity. Pt and daughter verbalize understanding)    Goals:  Encounter Problems       Encounter Problems (Active)       OT Goals       pt will dress LB with modified indep (Progressing)       Start:  04/02/25    Expected End:  04/16/25            Pt will verbalize 3 energy conservation strategies to increase indep with aDLS (Progressing)       Start:  04/02/25    Expected End:  04/16/25            Pt will transfer to bed, chair ,toilet with SBA (Progressing)       Start:  04/02/25    Expected End:  04/16/25            Pt will attend to ADL task x 5 minutes with less than 3 vc's to redirect (Progressing)       Start:  04/02/25    Expected End:  04/16/25

## 2025-04-05 NOTE — PROGRESS NOTES
Vancomycin Dosing by Pharmacy- FOLLOW UP    Rodrigo Conte is a 79 y.o. year old male who Pharmacy has been consulted for vancomycin dosing for cellulitis, skin and soft tissue. Based on the patient's indication and renal status this patient is being dosed based on a goal AUC of 400-600.     Renal function is currently stable.    Current vancomycin dose: 1250 mg given every 24 hours    Estimated vancomycin AUC on current dose: 470 mg/L.hr     Visit Vitals  /60   Pulse 87   Temp 35.5 °C (95.9 °F)   Resp 18        Lab Results   Component Value Date    CREATININE 0.95 2025    CREATININE 0.94 2025    CREATININE 0.87 2025    CREATININE 1.14 2025        Patient weight is as follows:   Vitals:    25   Weight: 69.2 kg (152 lb 8.9 oz)       Cultures:  No results found for the encounter in last 14 days.       I/O last 3 completed shifts:  In: 450 (6.5 mL/kg) [IV Piggyback:450]  Out: - (0 mL/kg)   Weight: 69.2 kg   I/O during current shift:  No intake/output data recorded.    Temp (24hrs), Av.6 °C (96 °F), Min:35.1 °C (95.2 °F), Max:35.8 °C (96.4 °F)      Assessment/Plan    Within goal AUC range. Continue current vancomycin regimen.    This dosing regimen is predicted by [employer] to result in the following pharmacokinetic parameters:  Loading dose: N/A  Regimen: 1250 mg IV every 24 hours.  Start time: 18:08 on 2025  Exposure target: AUC24 (range)400-600 mg/L.hr   [id number]: 460 mg/L.hr  AUC24,ss: 470 mg/L.hr  Probability of AUC24 > 400: 93 %  Ctrough,ss: 13.1 mg/L  Probability of Ctrough,ss > 20: 1 %    The next level will be obtained on 25 at 0500. May be obtained sooner if clinically indicated.   Will continue to monitor renal function daily while on vancomycin and order serum creatinine at least every 48 hours if not already ordered.  Follow for continued vancomycin needs, clinical response, and signs/symptoms of toxicity.       Fawn Mcgovern, PharmD

## 2025-04-06 VITALS
WEIGHT: 152.56 LBS | BODY MASS INDEX: 27.03 KG/M2 | HEIGHT: 63 IN | DIASTOLIC BLOOD PRESSURE: 79 MMHG | OXYGEN SATURATION: 90 % | TEMPERATURE: 96.1 F | HEART RATE: 97 BPM | RESPIRATION RATE: 20 BRPM | SYSTOLIC BLOOD PRESSURE: 145 MMHG

## 2025-04-06 LAB
ANION GAP SERPL CALC-SCNC: 9 MMOL/L (ref 10–20)
BACTERIA BLD CULT: NORMAL
BACTERIA BLD CULT: NORMAL
BASOPHILS # BLD AUTO: 0.04 X10*3/UL (ref 0–0.1)
BASOPHILS NFR BLD AUTO: 0.6 %
BUN SERPL-MCNC: 9 MG/DL (ref 6–23)
CALCIUM SERPL-MCNC: 8.6 MG/DL (ref 8.6–10.3)
CHLORIDE SERPL-SCNC: 101 MMOL/L (ref 98–107)
CO2 SERPL-SCNC: 34 MMOL/L (ref 21–32)
CREAT SERPL-MCNC: 1 MG/DL (ref 0.5–1.3)
EGFRCR SERPLBLD CKD-EPI 2021: 77 ML/MIN/1.73M*2
EOSINOPHIL # BLD AUTO: 0.45 X10*3/UL (ref 0–0.4)
EOSINOPHIL NFR BLD AUTO: 6.8 %
ERYTHROCYTE [DISTWIDTH] IN BLOOD BY AUTOMATED COUNT: 12 % (ref 11.5–14.5)
GLUCOSE SERPL-MCNC: 95 MG/DL (ref 74–99)
HCT VFR BLD AUTO: 35.3 % (ref 41–52)
HGB BLD-MCNC: 10.9 G/DL (ref 13.5–17.5)
IMM GRANULOCYTES # BLD AUTO: 0.02 X10*3/UL (ref 0–0.5)
IMM GRANULOCYTES NFR BLD AUTO: 0.3 % (ref 0–0.9)
LYMPHOCYTES # BLD AUTO: 1.14 X10*3/UL (ref 0.8–3)
LYMPHOCYTES NFR BLD AUTO: 17.4 %
MAGNESIUM SERPL-MCNC: 1.61 MG/DL (ref 1.6–2.4)
MCH RBC QN AUTO: 31.3 PG (ref 26–34)
MCHC RBC AUTO-ENTMCNC: 30.9 G/DL (ref 32–36)
MCV RBC AUTO: 101 FL (ref 80–100)
MONOCYTES # BLD AUTO: 0.62 X10*3/UL (ref 0.05–0.8)
MONOCYTES NFR BLD AUTO: 9.4 %
NEUTROPHILS # BLD AUTO: 4.3 X10*3/UL (ref 1.6–5.5)
NEUTROPHILS NFR BLD AUTO: 65.5 %
NRBC BLD-RTO: 0 /100 WBCS (ref 0–0)
PLATELET # BLD AUTO: 255 X10*3/UL (ref 150–450)
POTASSIUM SERPL-SCNC: 3.9 MMOL/L (ref 3.5–5.3)
PROCALCITONIN SERPL-MCNC: 0.4 NG/ML
RBC # BLD AUTO: 3.48 X10*6/UL (ref 4.5–5.9)
SODIUM SERPL-SCNC: 140 MMOL/L (ref 136–145)
WBC # BLD AUTO: 6.6 X10*3/UL (ref 4.4–11.3)

## 2025-04-06 PROCEDURE — 2500000002 HC RX 250 W HCPCS SELF ADMINISTERED DRUGS (ALT 637 FOR MEDICARE OP, ALT 636 FOR OP/ED): Performed by: STUDENT IN AN ORGANIZED HEALTH CARE EDUCATION/TRAINING PROGRAM

## 2025-04-06 PROCEDURE — 36415 COLL VENOUS BLD VENIPUNCTURE: CPT | Performed by: STUDENT IN AN ORGANIZED HEALTH CARE EDUCATION/TRAINING PROGRAM

## 2025-04-06 PROCEDURE — 99239 HOSP IP/OBS DSCHRG MGMT >30: CPT | Performed by: STUDENT IN AN ORGANIZED HEALTH CARE EDUCATION/TRAINING PROGRAM

## 2025-04-06 PROCEDURE — 2500000005 HC RX 250 GENERAL PHARMACY W/O HCPCS: Performed by: STUDENT IN AN ORGANIZED HEALTH CARE EDUCATION/TRAINING PROGRAM

## 2025-04-06 PROCEDURE — 85025 COMPLETE CBC W/AUTO DIFF WBC: CPT | Performed by: STUDENT IN AN ORGANIZED HEALTH CARE EDUCATION/TRAINING PROGRAM

## 2025-04-06 PROCEDURE — 2500000001 HC RX 250 WO HCPCS SELF ADMINISTERED DRUGS (ALT 637 FOR MEDICARE OP): Performed by: STUDENT IN AN ORGANIZED HEALTH CARE EDUCATION/TRAINING PROGRAM

## 2025-04-06 PROCEDURE — 2500000001 HC RX 250 WO HCPCS SELF ADMINISTERED DRUGS (ALT 637 FOR MEDICARE OP): Performed by: INTERNAL MEDICINE

## 2025-04-06 PROCEDURE — 80048 BASIC METABOLIC PNL TOTAL CA: CPT | Performed by: STUDENT IN AN ORGANIZED HEALTH CARE EDUCATION/TRAINING PROGRAM

## 2025-04-06 PROCEDURE — 97530 THERAPEUTIC ACTIVITIES: CPT | Mod: GP,CQ

## 2025-04-06 PROCEDURE — 83735 ASSAY OF MAGNESIUM: CPT | Performed by: STUDENT IN AN ORGANIZED HEALTH CARE EDUCATION/TRAINING PROGRAM

## 2025-04-06 PROCEDURE — 84145 PROCALCITONIN (PCT): CPT | Mod: ELYLAB | Performed by: STUDENT IN AN ORGANIZED HEALTH CARE EDUCATION/TRAINING PROGRAM

## 2025-04-06 PROCEDURE — 94640 AIRWAY INHALATION TREATMENT: CPT

## 2025-04-06 RX ORDER — L. ACIDOPHILUS/L.BULGARICUS 1MM CELL
1 TABLET ORAL 2 TIMES DAILY
Start: 2025-04-06

## 2025-04-06 RX ORDER — DOXYCYCLINE 100 MG/1
100 CAPSULE ORAL 2 TIMES DAILY
Qty: 28 CAPSULE | Refills: 0 | Status: SHIPPED | OUTPATIENT
Start: 2025-04-06 | End: 2025-04-20

## 2025-04-06 RX ORDER — IBUPROFEN 600 MG/1
600 TABLET ORAL 2 TIMES DAILY
Qty: 20 TABLET | Refills: 0 | Status: SHIPPED | OUTPATIENT
Start: 2025-04-06 | End: 2025-04-16

## 2025-04-06 RX ORDER — MULTIVIT-MIN/IRON FUM/FOLIC AC 7.5 MG-4
1 TABLET ORAL DAILY
Start: 2025-04-07

## 2025-04-06 RX ADMIN — FLUTICASONE FUROATE AND VILANTEROL TRIFENATATE 1 PUFF: 100; 25 POWDER RESPIRATORY (INHALATION) at 06:29

## 2025-04-06 RX ADMIN — IBUPROFEN 600 MG: 600 TABLET, FILM COATED ORAL at 09:44

## 2025-04-06 RX ADMIN — IPRATROPIUM BROMIDE AND ALBUTEROL SULFATE 3 ML: 2.5; .5 SOLUTION RESPIRATORY (INHALATION) at 13:35

## 2025-04-06 RX ADMIN — Medication 6 L/MIN: at 13:35

## 2025-04-06 RX ADMIN — FOLIC ACID 1 MG: 1 TABLET ORAL at 09:44

## 2025-04-06 RX ADMIN — Medication 1 TABLET: at 09:44

## 2025-04-06 RX ADMIN — Medication 100 MG: at 09:44

## 2025-04-06 RX ADMIN — PRAVASTATIN SODIUM 40 MG: 20 TABLET ORAL at 09:44

## 2025-04-06 RX ADMIN — PRIMIDONE 50 MG: 50 TABLET ORAL at 09:44

## 2025-04-06 RX ADMIN — IPRATROPIUM BROMIDE AND ALBUTEROL SULFATE 3 ML: 2.5; .5 SOLUTION RESPIRATORY (INHALATION) at 00:40

## 2025-04-06 RX ADMIN — ATENOLOL 50 MG: 50 TABLET ORAL at 09:44

## 2025-04-06 RX ADMIN — COLCHICINE 0.6 MG: 0.6 TABLET, FILM COATED ORAL at 09:44

## 2025-04-06 RX ADMIN — TIOTROPIUM BROMIDE INHALATION SPRAY 2 PUFF: 3.12 SPRAY, METERED RESPIRATORY (INHALATION) at 06:30

## 2025-04-06 ASSESSMENT — COGNITIVE AND FUNCTIONAL STATUS - GENERAL
DRESSING REGULAR UPPER BODY CLOTHING: A LITTLE
TOILETING: A LITTLE
HELP NEEDED FOR BATHING: A LITTLE
TURNING FROM BACK TO SIDE WHILE IN FLAT BAD: A LITTLE
MOVING TO AND FROM BED TO CHAIR: A LITTLE
MOBILITY SCORE: 19
MOVING TO AND FROM BED TO CHAIR: A LITTLE
MOBILITY SCORE: 16
DRESSING REGULAR LOWER BODY CLOTHING: A LOT
PERSONAL GROOMING: A LITTLE
CLIMB 3 TO 5 STEPS WITH RAILING: TOTAL
WALKING IN HOSPITAL ROOM: A LITTLE
MOVING FROM LYING ON BACK TO SITTING ON SIDE OF FLAT BED WITH BEDRAILS: A LITTLE
WALKING IN HOSPITAL ROOM: A LITTLE
STANDING UP FROM CHAIR USING ARMS: A LITTLE
STANDING UP FROM CHAIR USING ARMS: A LITTLE
DAILY ACTIVITIY SCORE: 18
CLIMB 3 TO 5 STEPS WITH RAILING: A LOT

## 2025-04-06 ASSESSMENT — LIFESTYLE VARIABLES
AUDITORY DISTURBANCES: NOT PRESENT
NAUSEA AND VOMITING: NO NAUSEA AND NO VOMITING
HEADACHE, FULLNESS IN HEAD: NOT PRESENT
ORIENTATION AND CLOUDING OF SENSORIUM: ORIENTED AND CAN DO SERIAL ADDITIONS
PAROXYSMAL SWEATS: NO SWEAT VISIBLE
ANXIETY: NO ANXIETY, AT EASE
AGITATION: NORMAL ACTIVITY
TREMOR: NO TREMOR
VISUAL DISTURBANCES: NOT PRESENT
TOTAL SCORE: 0

## 2025-04-06 ASSESSMENT — PAIN SCALES - GENERAL
PAINLEVEL_OUTOF10: 0 - NO PAIN
PAINLEVEL_OUTOF10: 0 - NO PAIN

## 2025-04-06 ASSESSMENT — PAIN - FUNCTIONAL ASSESSMENT
PAIN_FUNCTIONAL_ASSESSMENT: 0-10
PAIN_FUNCTIONAL_ASSESSMENT: 0-10

## 2025-04-06 NOTE — DISCHARGE SUMMARY
Medical Group Discharge Summary  DISCHARGE DIAGNOSIS     LUE Cellulitis  Superficial Vein Thrombosis   Hx Gout  Essential Tremor  ETOH Dependence  Severe COPD  Ambulatory dysfunction      HOSPITAL COURSE AND DETAILS     Rodrigo Conte is a 79 y.o. male with a past medical history including COPD, Chronic hypoxic respiratory failure on 6 L home O2,  Essential tremor, and HLD who was brought to hospital by daughter due to concerns of worsening left hand swelling as well as increased weakness in recent history. Workup in the ED was concerning for cellulitis of the area as well as bascilic vein thrombosis of the LUE. Patient was admitted to the hospitalist service for continued management and monitoring.     LUE Cellulitis  -Given extent of edema/erythema CT of LUE was obtained. Findings consitent with cellulitis of dorsum of hand appreciated. No evidence of abscess, infectious myositis, or tenosynovitis was noted.    -ID Followed in house. Assisted with antibiotic titration. Initially on broad spectrum and de-escaalted to vancomycin. As tolerated/continued to improve. Advised DC on 14 day course of doxycycline.   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   -Managed with warm compresses/ibuprofen in house and is to continue on DC.      Hx Gout  -Treated empirically for flare in house with colchicine     Essential Tremor  -On primidone 100 mg BID, will continue     ETOH Dependence  -Drinks 2-3 beers daily. Denies hx of withdrawal, and has not exhibited signs of withdrawal during hospitalization  -Thiamine/folate/MV     Severe COPD  -On 6L O2 at baseline.   -Scheduled/PRN nebs. As not in exacerbation will defer steroid administration     Ambulatory dysfunction  -PT/OT eval appreciated. [facility]. Care coordinators arranged placement to SNF      Patient was in stable condition on day of discharge without acute concerns.      35 minutes spent on discharge. Time calculated includes outpatient care coordination, bedside education, and counselling.     ---Of note, parts of this documentation were completed using the Dragon Dictation system (voice recognition software). There may be spelling and/or grammatical errors that were not corrected prior to final submission.---    Dre Vergara MD    DISCHARGE PHYSICAL EXAM     Last Recorded Vitals:  Vitals:    04/06/25 0449 04/06/25 0815 04/06/25 0830 04/06/25 1335   BP: 116/68  101/63    BP Location: Right arm  Right arm    Patient Position: Sitting  Sitting    Pulse: 96  99    Resp: 18  20    Temp: 36.1 °C (97 °F)  35.3 °C (95.5 °F)    TempSrc: Temporal  Temporal    SpO2: 94% 97% 94% 96%   Weight:       Height:           Physical Exam  General: Ill-appearing elderly male in mild distress  HEENT: Clear sclera, EOMI, trachea midline, moist mucous membranes  Respiratory: Equal chest rise, no retractions  Abdomen: Soft, nontender, nondistended  Extremities: LUE erythema and swelling from mid phalanges to forearm as per blow.   Neurological: Spontaneously moves all extremities, no dysarthria, cranial nerves grossly intact  Psychiatric: Appropriate mood and affect  Skin: Warm, dry    DISCHARGE MEDICATIONS        Your medication list        START taking these medications        Instructions Last Dose Given Next Dose Due   doxycycline 100 mg capsule  Commonly known as: Vibramycin      Take 1 capsule (100 mg) by mouth 2 times a day for 14 days. Take with at least 8 ounces (large glass) of water, do not lie down for 30 minutes after       ibuprofen 600 mg tablet      Take 1 tablet (600 mg) by mouth 2 times a day for 10 days.       lactobacillus acidophilus tablet tablet      Take 1 tablet by mouth 2 times a day.       multivitamin with minerals tablet  Start taking on: April 7, 2025      Take 1 tablet by mouth once daily.              CONTINUE taking these medications        Instructions Last Dose  Given Next Dose Due   albuterol 90 mcg/actuation inhaler           atenolol 50 mg tablet  Commonly known as: Tenormin      Take 1 tablet (50 mg) by mouth once daily. As directed       budesonide-glycopyr-formoterol 160-9-4.8 mcg/actuation HFA aerosol inhaler  Commonly known as: BREZTRI           mirtazapine 15 mg tablet  Commonly known as: Remeron      Take 1 tablet (15 mg) by mouth once daily at bedtime.       primidone 50 mg tablet  Commonly known as: Mysoline      Take 1 tablet (50 mg) by mouth once daily in the evening.              STOP taking these medications      furosemide 20 mg tablet  Commonly known as: Lasix        loperamide 2 mg capsule  Commonly known as: Imodium        omeprazole 20 mg DR capsule  Commonly known as: PriLOSEC        predniSONE 5 mg tablet  Commonly known as: Deltasone               ASK your doctor about these medications        Instructions Last Dose Given Next Dose Due   albuterol 2.5 mg /3 mL (0.083 %) nebulizer solution           folic acid 1 mg tablet  Commonly known as: Folvite      Take 1 tablet (1 mg) by mouth once daily.       pravastatin 40 mg tablet  Commonly known as: Pravachol      Take 1 tablet (40 mg) by mouth once daily.                 Where to Get Your Medications        These medications were sent to [id number] [address] AT [facility] & [address] [address]      Hours: 24-hours Phone: [phone]   doxycycline 100 mg capsule  ibuprofen 600 mg tablet       Information about where to get these medications is not yet available    Ask your nurse or doctor about these medications  lactobacillus acidophilus tablet tablet  multivitamin with minerals tablet           OUTPATIENT FOLLOW-UP     No future appointments.

## 2025-04-06 NOTE — CARE PLAN
Problem: Chronic Conditions and Co-morbidities  Goal: Patient's chronic conditions and co-morbidity symptoms are monitored and maintained or improved  Outcome: Progressing     Problem: Nutrition  Goal: Nutrient intake appropriate for maintaining nutritional needs  Outcome: Progressing     Problem: Discharge Planning  Goal: Discharge to home or other facility with appropriate resources  Outcome: Progressing     Problem: Respiratory  Goal: No signs of respiratory distress (eg. Use of accessory muscles. Peds grunting)  Outcome: Progressing  Goal: Wean oxygen to maintain O2 saturation per order/standard this shift  Outcome: Progressing  Goal: Clear secretions with interventions this shift  Outcome: Progressing  Goal: Minimize anxiety/maximize coping throughout shift  Outcome: Progressing  Goal: Minimal/no exertional discomfort or dyspnea this shift  Outcome: Progressing  Goal: Patent airway maintained this shift  Outcome: Progressing  Goal: Tolerate pulmonary toileting this shift  Outcome: Progressing  Goal: Verbalize decreased shortness of breath this shift  Outcome: Progressing  Goal: Increase self care and/or family involvement in next 24 hours  Outcome: Progressing     Problem: Infection related to problem list condition  Goal: Infection will resolve through treatment  Outcome: Progressing     Problem: Skin  Goal: Participates in plan/prevention/treatment measures  Outcome: Progressing  Goal: Prevent/manage excess moisture  Outcome: Progressing  Goal: Prevent/minimize sheer/friction injuries  Outcome: Progressing  Goal: Promote/optimize nutrition  Outcome: Progressing  Goal: Promote skin healing  Outcome: Progressing  Goal: Decreased wound size/increased tissue granulation at next dressing change  Outcome: Progressing     Problem: Risk for Peripheral Neurovascular Dysfunction  Goal: Patient will not display a decrease in peripheral pulses or pallor. Patient will not report feelings of numbness and tingling  (paresthesia).  Outcome: Progressing     Problem: Risk for Alcohol Withdrawal Syndrome  Goal: The patient will not experience auditory or visual hallucinations. The patient will be alert and oriented x 4. The patient will not exhibit increased irritability or hyperactivity.  Outcome: Progressing     Problem: Fall/Injury  Goal: Not fall by end of shift  Outcome: Progressing  Goal: Be free from injury by end of the shift  Outcome: Progressing  Goal: Verbalize understanding of personal risk factors for fall in the hospital  Outcome: Progressing  Goal: Verbalize understanding of risk factor reduction measures to prevent injury from fall in the home  Outcome: Progressing  Goal: Use assistive devices by end of the shift  Outcome: Progressing  Goal: Pace activities to prevent fatigue by end of the shift  Outcome: Progressing     Problem: Pain  Goal: Takes deep breaths with improved pain control throughout the shift  Outcome: Progressing  Goal: Turns in bed with improved pain control throughout the shift  Outcome: Progressing  Goal: Walks with improved pain control throughout the shift  Outcome: Progressing  Goal: Performs ADL's with improved pain control throughout shift  Outcome: Progressing  Goal: Participates in PT with improved pain control throughout the shift  Outcome: Progressing  Goal: Free from opioid side effects throughout the shift  Outcome: Progressing  Goal: Free from acute confusion related to pain meds throughout the shift  Outcome: Progressing   The patient's goals for the shift include      The clinical goals for the shift include pt will remain hemodynamically stable throughout shift    Over the shift, the patient did not make progress toward the following goals. Barriers to progression include . Recommendations to address these barriers include .

## 2025-04-06 NOTE — PROGRESS NOTES
Physical Therapy    Physical Therapy Treatment    Patient Name: Rodrigo Conte  MRN: [id number]  Department: [facility]  Room: [address] [place] [address]  Today's Date: 4/6/2025  Time Calculation  Start Time: 0815  Stop Time: 0830  Time Calculation (min): 15 min         Assessment/Plan   PT Assessment  PT Assessment Results: Decreased mobility, Decreased strength  Rehab Prognosis: Good  End of Session Communication: Bedside nurse  Assessment Comment: Pt continues to make progress and will benefit with continued PT services to further progress strength and functional mobility.  End of Session Patient Position: Alarm on, Up in chair  PT Plan  Inpatient/Swing Bed or Outpatient: Inpatient  PT Plan  Treatment/Interventions: Bed mobility, Transfer training, Gait training, Therapeutic exercise  PT Plan: Ongoing PT  PT Frequency: 3 times per week  PT Discharge Recommendations: Moderate intensity level of continued care, Low intensity level of continued care  PT Recommended Transfer Status: Assist x1, Assistive device    General Visit Information:   PT  Visit  PT Received On: 04/06/25  General  Reason for Referral: Impaired mobility  Referred By: PT/OT 4/1/25 Ursula  Past Medical History Relevant to Rehab: COPD, dyslipidemia, HTN, 6Lo2 home, L IF and MF amp from saw accident 40 yrs ago. ETOH, covid 19, tremors, OA R foot  Prior to Session Communication: Bedside nurse  Patient Position Received: Bed, 3 rail up, Alarm on  General Comment: Pt is pleasant and cooperative to work with PT. (oxygen)    Subjective   Precautions:  Precautions  Medical Precautions: Fall precautions, Oxygen therapy device and L/min  Precautions Comment: pt on 6L NC            Objective   Pain:  Pain Assessment  Pain Assessment: 0-10  0-10 (Numeric) Pain Score: 0 - No pain  Cognition:  Cognition  Orientation Level: Oriented X4  Coordination:     Postural Control:     Extremity/Trunk Assessments:        Activity Tolerance:  Activity Tolerance  Endurance: Decreased tolerance for  "upright activites  Treatments:  Therapeutic Exercise  Therapeutic Exercise Performed: Yes (Seated AP and LAQ x 5-10ea BLE)    Balance/Neuromuscular Re-Education  Balance/Neuromuscular Re-Education Activity Performed:  (Pt standing at WW and reaching OBOS ~2-4\" with no UE support and away from chair at CG to SBA but no LOB.)    Bed Mobility  Bed Mobility: Yes (Sup>sit with min A for trunk management with HOB elevated. Pt able to move BLE over EOB.)    Ambulation/Gait Training  Ambulation/Gait Training Performed: Yes (Pt ambulating 5' x4 with WW and CGA. Pt noted to have tremors in BUE and demos a slow, step to gait. Pt SPO2 maintains at 94-97%. Mild unsteadiness noted but no LOB.)  Transfers  Transfer: Yes (Sit<>stand from EOB and chair with WW and CGA. Increased cues for hand placement to avoid pulling up on WW.)    Outcome Measures:  [facility] Basic Mobility  Turning from your back to your side while in a flat bed without using bedrails: A little  Moving from lying on your back to sitting on the side of a flat bed without using bedrails: A little  Moving to and from bed to chair (including a wheelchair): A little  Standing up from a chair using your arms (e.g. wheelchair or bedside chair): A little  To walk in hospital room: A little  Climbing 3-5 steps with railing: Total  Basic Mobility - Total Score: 16    Education Documentation  Mobility Training, taught by Anaya Kahn PTA at 4/6/2025 11:04 AM.  Learner: Patient  Readiness: Acceptance  Method: Explanation  Response: Verbalizes Understanding, Needs Reinforcement    Education Comments  No comments found.        EDUCATION:  Outpatient Education  Individual(s) Educated: Patient  Education Provided: Fall Risk, Home Exercise Program, Home Safety    Encounter Problems       Encounter Problems (Active)       PT Problem       Bed mobility supine <> sit modified independent  (Progressing)       Start:  04/02/25    Expected End:  04/16/25            Transfers sit <> " "stand/bed<> chair with ww SBA (Progressing)       Start:  04/02/25    Expected End:  04/16/25            Patient ambulated with ww 30' SBA (Progressing)       Start:  04/02/25    Expected End:  04/16/25            Patient to demonstrate multilevel reach with single  UE support > 2\" out of ANNE in stand (Progressing)       Start:  04/02/25    Expected End:  04/16/25                   "

## 2025-04-06 NOTE — NURSING NOTE
Report called to Shraddha at [facility]- patient aware of transport, belongings packed.

## 2025-04-06 NOTE — PROGRESS NOTES
Patient medically clear for discharge. [facility] updated and can admit today. 1700 confirmed in roundtrip for pickup. Writer updated patient- in agreement with discharge to [facility] today. Writer called and left message with daughter Anaya Conte of discharge time. Bedside nurse notified and provided report number. MARLA Coronado

## 2025-04-06 NOTE — CARE PLAN
The clinical goals for the shift include saftey    Problem: Chronic Conditions and Co-morbidities  Goal: Patient's chronic conditions and co-morbidity symptoms are monitored and maintained or improved  Outcome: Adequate for Discharge     Problem: Nutrition  Goal: Nutrient intake appropriate for maintaining nutritional needs  Outcome: Adequate for Discharge     Problem: Discharge Planning  Goal: Discharge to home or other facility with appropriate resources  Outcome: Adequate for Discharge     Problem: Respiratory  Goal: No signs of respiratory distress (eg. Use of accessory muscles. Peds grunting)  Outcome: Adequate for Discharge  Goal: Wean oxygen to maintain O2 saturation per order/standard this shift  Outcome: Adequate for Discharge  Goal: Clear secretions with interventions this shift  Outcome: Adequate for Discharge  Goal: Minimize anxiety/maximize coping throughout shift  Outcome: Adequate for Discharge  Goal: Minimal/no exertional discomfort or dyspnea this shift  Outcome: Adequate for Discharge  Goal: Patent airway maintained this shift  Outcome: Adequate for Discharge  Goal: Tolerate pulmonary toileting this shift  Outcome: Adequate for Discharge  Goal: Verbalize decreased shortness of breath this shift  Outcome: Adequate for Discharge  Goal: Increase self care and/or family involvement in next 24 hours  Outcome: Adequate for Discharge     Problem: Infection related to problem list condition  Goal: Infection will resolve through treatment  Outcome: Adequate for Discharge     Problem: Skin  Goal: Participates in plan/prevention/treatment measures  Outcome: Adequate for Discharge  Goal: Prevent/manage excess moisture  Outcome: Adequate for Discharge  Goal: Prevent/minimize sheer/friction injuries  4/6/2025 1643 by Maty Gomez RN  Outcome: Adequate for Discharge  4/6/2025 1643 by Maty Gomez RN  Flowsheets (Taken 4/6/2025 1643)  Prevent/minimize sheer/friction injuries:   Use pull sheet   Increase  activity/out of bed for meals  Goal: Promote/optimize nutrition  4/6/2025 1643 by Maty Gomez RN  Outcome: Adequate for Discharge  4/6/2025 1643 by Maty Gomez RN  Flowsheets (Taken 4/6/2025 1643)  Promote/optimize nutrition: Consume > 50% meals/supplements  Goal: Promote skin healing  Outcome: Adequate for Discharge  Goal: Decreased wound size/increased tissue granulation at next dressing change  Outcome: Adequate for Discharge     Problem: Risk for Peripheral Neurovascular Dysfunction  Goal: Patient will not display a decrease in peripheral pulses or pallor. Patient will not report feelings of numbness and tingling (paresthesia).  Outcome: Adequate for Discharge     Problem: Risk for Alcohol Withdrawal Syndrome  Goal: The patient will not experience auditory or visual hallucinations. The patient will be alert and oriented x 4. The patient will not exhibit increased irritability or hyperactivity.  Outcome: Adequate for Discharge     Problem: Fall/Injury  Goal: Not fall by end of shift  Outcome: Adequate for Discharge  Goal: Be free from injury by end of the shift  Outcome: Adequate for Discharge  Goal: Verbalize understanding of personal risk factors for fall in the hospital  Outcome: Adequate for Discharge  Goal: Verbalize understanding of risk factor reduction measures to prevent injury from fall in the home  Outcome: Adequate for Discharge  Goal: Use assistive devices by end of the shift  Outcome: Adequate for Discharge  Goal: Pace activities to prevent fatigue by end of the shift  Outcome: Adequate for Discharge     Problem: Pain  Goal: Takes deep breaths with improved pain control throughout the shift  Outcome: Adequate for Discharge  Goal: Turns in bed with improved pain control throughout the shift  Outcome: Adequate for Discharge  Goal: Walks with improved pain control throughout the shift  Outcome: Adequate for Discharge  Goal: Performs ADL's with improved pain control throughout shift  Outcome:  Adequate for Discharge  Goal: Participates in PT with improved pain control throughout the shift  Outcome: Adequate for Discharge  Goal: Free from opioid side effects throughout the shift  Outcome: Adequate for Discharge  Goal: Free from acute confusion related to pain meds throughout the shift  Outcome: Adequate for Discharge

## 2025-04-07 ENCOUNTER — NURSING HOME VISIT (OUTPATIENT)
Dept: POST ACUTE CARE | Facility: EXTERNAL LOCATION | Age: 80
End: 2025-04-07
Payer: MEDICARE

## 2025-04-07 DIAGNOSIS — I10 PRIMARY HYPERTENSION: ICD-10-CM

## 2025-04-07 DIAGNOSIS — J44.9 CHRONIC OBSTRUCTIVE PULMONARY DISEASE, UNSPECIFIED COPD TYPE (MULTI): ICD-10-CM

## 2025-04-07 DIAGNOSIS — Z99.81 CHRONIC HYPOXIC RESPIRATORY FAILURE, ON HOME OXYGEN THERAPY: ICD-10-CM

## 2025-04-07 DIAGNOSIS — F10.10 ALCOHOL ABUSE: ICD-10-CM

## 2025-04-07 DIAGNOSIS — R25.1 TREMOR: ICD-10-CM

## 2025-04-07 DIAGNOSIS — R53.1 GENERALIZED WEAKNESS: ICD-10-CM

## 2025-04-07 DIAGNOSIS — E78.2 MIXED HYPERLIPIDEMIA: ICD-10-CM

## 2025-04-07 DIAGNOSIS — L03.114 CELLULITIS OF LEFT UPPER EXTREMITY: Primary | ICD-10-CM

## 2025-04-07 DIAGNOSIS — J96.11 CHRONIC HYPOXIC RESPIRATORY FAILURE, ON HOME OXYGEN THERAPY: ICD-10-CM

## 2025-04-07 DIAGNOSIS — I80.9 THROMBOPHLEBITIS: ICD-10-CM

## 2025-04-07 PROCEDURE — 99310 SBSQ NF CARE HIGH MDM 45: CPT | Performed by: NURSE PRACTITIONER

## 2025-04-07 NOTE — LETTER
"Patient: Rodrigo Conte  : 1945    Encounter Date: 2025    Name: Rodrigo Conte  YOB: 1945    INITIAL NURSE PRACTITIONER FOLLOW UP VISIT: [facility],   [facility]  - 25: LUE cellulitis, superficial vein thrombosis, h/o gout, essential tremor, Alcohol use disorder, Severe COPD, and ambulatory dysfunction    HPI   Pt is a 79 yr old male who [facility] after a recent hospitalization.   PMH of COPD, chronic resp hypoxic failure on home Oxygen 6L, essential tremors, HLD, and alcohol use disorder.   Daughter brought patient to hospital for swelling to left hand and weakness.   Patient found to have cellulitis of left upper extremity. ID followed. ON Vanco and now Doxy po x 14 days. Found to have partial occlusive basilic vein thrombosis per vascular US. Episode of gout and treated w/colchicine. No episodes of withdrawal from ETOH while hospitalized. Received steroids for COPD exacerbation.   Patient is now here for PT/OT.  Patient is resting in bed. He appears to be chronically ill, fatigue and SOB w/minimal exertion such as talking. O2 per NC in place. Patient c/o some pain to LUE. Denies CP or worsening SOB.    REVIEW OF SYSTEMS:  Review of systems are negative except where noted in HPI.    /69   Pulse 84   Temp 36.8 °C (98.2 °F)   Resp 18   Ht 1.6 m (5' 3\")   Wt 71.8 kg (158 lb 4.8 oz)   SpO2 97% Comment: Oxygen nc 6L  BMI 28.04 kg/m²      Physical Exam  Constitutional:       Appearance: Normal appearance. He is obese. He is ill-appearing.   HENT:      Head: Normocephalic.      Right Ear: External ear normal.      Left Ear: External ear normal.      Nose: Nose normal.      Mouth/Throat:      Mouth: Mucous membranes are moist.   Eyes:      Extraocular Movements: Extraocular movements intact.      Conjunctiva/sclera: Conjunctivae normal.      Pupils: Pupils are equal, round, and reactive to light.   Cardiovascular:      Rate and Rhythm: Normal rate and regular rhythm. "      Pulses: Normal pulses.      Heart sounds: Murmur heard.   Pulmonary:      Effort: Pulmonary effort is normal.      Comments: Diminished breath sounds  Abdominal:      General: Bowel sounds are normal. There is no distension.      Palpations: Abdomen is soft.      Tenderness: There is no abdominal tenderness.   Genitourinary:     Comments: Not examined  Musculoskeletal:         General: Swelling and tenderness present. Normal range of motion.      Cervical back: Normal range of motion and neck supple.      Comments: Generalized weakness   Skin:     General: Skin is warm and dry.      Capillary Refill: Capillary refill takes less than 2 seconds.      Findings: Erythema present.      Comments: LUE erythema, edematous   Neurological:      General: No focal deficit present.      Mental Status: He is alert and oriented to person, place, and time. Mental status is at baseline.   Psychiatric:         Mood and Affect: Mood normal.         Behavior: Behavior normal.          ADVANCED CARE PLANNING: Discussion done with the patient and family if available regarding code status, diagnosis, and the prognosis of the patient.     CODE STATUS: DNRCCA/DNI    DISCHARGE PLANNING:  Patient will be discharge home when goals are met.   Discharge planner to communicate ongoing updates regarding discharge plan.     RECENT HOSPITALIZATION RECORDS REVIEWED:   All laboratories, diagnostic tests, progress notes, consultation notes, and discharge notes available reviewed from recent hospitalization.     Allergies   Allergen Reactions   • Gemfibrozil Other     Adverse reaction       MEDICATION LIST FROM RECENT HOSPITALIZATION:    albuterol sulfate 2.5 mg /3 mL (0.083 %) Daily  Patient not taking: Reported on 4/3/2025    Atenolol 50 mg daily  Breztri 160-9-4.8 mcg 2 puffs bid  Primidone 50 mg daily  Pravastatin 40 mg daily  Doxy 100 mg BID til 4/21/25  Ibuprofen PRN  Folic acid 1 mg daily  Culturelle one bid  Mirtazapine 15 mg daily  MTV  daily    MEDICATIONS UPDATED AND RECONCILED AT THE FACILITY:  Please see Nursing facility medication list.    Assessment/Plan   Problem List Items Addressed This Visit       Alcohol abuse    COPD (chronic obstructive pulmonary disease) (Multi)    HLD (hyperlipidemia)    HTN (hypertension)    Tremor    Generalized weakness    Thrombophlebitis    Cellulitis of left upper extremity - Primary     Other Visit Diagnoses       Chronic hypoxic respiratory failure, on home oxygen therapy                Skin Integrity:  Nursing to monitor skin integrity as patient is at risk for pressure injuries.  Turn and reposition Q 2 hours or more.  Air mattress and when up in chair cushion reducing device.  Dietician to evaluate and recommend.  Nutritional supplement to be implemented if needed.  Please monitor skin integrity and other pressure areas.    WOUNDS:  Wound care per nursing  LUE: Cleanse left hand skin tear with normal saline apply Skin-Prep and Xeroform wrap with Kerlix and Ace wrap change daily and as needed  See Facility notes for measurements/assessment of wound.  Referral to wound clinic or wound team here at the facility to follow if appropriate.    PLAN:   Recent nursing evaluation and notes were reviewed.     Cellulitis:  Doxy 100 mg BID til 4/21/25  ACE wrap, wound care to hand  Lab Results   Component Value Date    WBC 6.6 04/06/2025    HGB 10.9 (L) 04/06/2025    HCT 35.3 (L) 04/06/2025     (H) 04/06/2025     04/06/2025     Tremors:  Primidone 100 mg QHS    Severe COPD:  Monitor resp status  Oxygen 6 L per baseline at home  Con Albuterol NEBs routine and PRN  Cont Breztri routine    HTN:   Monitor and follow BP readings.   Continue home meds - atenolol   Labs to be done intermittently and adjust meds as needed.  BP readings reviewed -  110/60s  Lab Results   Component Value Date    GLUCOSE 95 04/06/2025    CALCIUM 8.6 04/06/2025     04/06/2025    K 3.9 04/06/2025    CO2 34 (H) 04/06/2025    CL  101 04/06/2025    BUN 9 04/06/2025    CREATININE 1.00 04/06/2025   Avoid nephrotoxic drugs.  Discussion with Rodrigo Conte regarding goals of BP readings to be less than 120/80, daily monitoring, medication compliance and life style changes.     Weakness and physical deconditioning:   PT/OT/ST to evaluate and treat to maximize strength, function, and endurance all while maintaining safety.       Any decline or change in condition needs to be communicated with the physician or myself.    Discussion with nursing staff regarding ongoing care and management.  Communication regarding patients status, overall condition, changes with plan (medications or treatments), and any questions from family completed if present.  If family not available, would communicate in person or via phone if needed.   We will continue with the plan and medications noted above.    We will continue to follow the patient here at the facility.    *Please note that nursing facility notes, facility EMR, outside laboratory agency, and  EMR do not interface.     Completion of the note was done through Dragon voice recognition technology and may include unintended or grammatical errors which may not have been recognized when finalizing the note.     Time:   I spent 45 minutes or greater with the patient. Greater than 50% of this time was spent in counseling and or coordination of care. The time includes prep time of reviewing vital signs, report from direct nursing staff and or therapists, hospital documentation, reviewing labs, radiographs, diagnostic tests and or consultations, time directly spent with the patient interviewing, examining, and education regarding diagnosis, treatments, and medications, as well as documentation in the electronic medical record, and reviewing the plan of care and any new orders with the patient, nursing staff and other staff directly related to the patients care.       LORNA Anderson-RAJAT       Electronically Signed  By: Krysta Huggins, LORNA-CNP   4/10/25  2:53 PM

## 2025-04-08 ENCOUNTER — NURSING HOME VISIT (OUTPATIENT)
Dept: POST ACUTE CARE | Facility: EXTERNAL LOCATION | Age: 80
End: 2025-04-08
Payer: MEDICARE

## 2025-04-08 VITALS — SYSTOLIC BLOOD PRESSURE: 134 MMHG | DIASTOLIC BLOOD PRESSURE: 80 MMHG | HEART RATE: 79 BPM

## 2025-04-08 DIAGNOSIS — J44.9 CHRONIC OBSTRUCTIVE PULMONARY DISEASE, UNSPECIFIED COPD TYPE (MULTI): ICD-10-CM

## 2025-04-08 DIAGNOSIS — I10 PRIMARY HYPERTENSION: Primary | ICD-10-CM

## 2025-04-08 DIAGNOSIS — R25.1 TREMOR: ICD-10-CM

## 2025-04-08 DIAGNOSIS — I80.9 THROMBOPHLEBITIS: ICD-10-CM

## 2025-04-08 DIAGNOSIS — L03.114 CELLULITIS OF LEFT UPPER EXTREMITY: ICD-10-CM

## 2025-04-08 DIAGNOSIS — R53.1 GENERALIZED WEAKNESS: ICD-10-CM

## 2025-04-08 PROCEDURE — 99306 1ST NF CARE HIGH MDM 50: CPT | Performed by: INTERNAL MEDICINE

## 2025-04-08 ASSESSMENT — ENCOUNTER SYMPTOMS
VOMITING: 0
AGITATION: 0
COUGH: 0
ARTHRALGIAS: 1
WEAKNESS: 1
ABDOMINAL PAIN: 0
FATIGUE: 1
CONFUSION: 0
DIFFICULTY URINATING: 0
SHORTNESS OF BREATH: 0
ACTIVITY CHANGE: 1
CHOKING: 0
DIARRHEA: 0
EYES NEGATIVE: 1
COLOR CHANGE: 1
TREMORS: 1
NAUSEA: 0

## 2025-04-08 NOTE — PROGRESS NOTES
"Name: Rodrigo Conte  YOB: 1945    INITIAL NURSE PRACTITIONER FOLLOW UP VISIT: [facility],   [facility] 4/1 - 4/6/25: LUE cellulitis, superficial vein thrombosis, h/o gout, essential tremor, Alcohol use disorder, Severe COPD, and ambulatory dysfunction    HPI   Pt is a 79 yr old male who [facility] after a recent hospitalization.   PMH of COPD, chronic resp hypoxic failure on home Oxygen 6L, essential tremors, HLD, and alcohol use disorder.   Daughter brought patient to hospital for swelling to left hand and weakness.   Patient found to have cellulitis of left upper extremity. ID followed. ON Vanco and now Doxy po x 14 days. Found to have partial occlusive basilic vein thrombosis per vascular US. Episode of gout and treated w/colchicine. No episodes of withdrawal from ETOH while hospitalized. Received steroids for COPD exacerbation.   Patient is now here for PT/OT.  Patient is resting in bed. He appears to be chronically ill, fatigue and SOB w/minimal exertion such as talking. O2 per NC in place. Patient c/o some pain to LUE. Denies CP or worsening SOB.    REVIEW OF SYSTEMS:  Review of systems are negative except where noted in HPI.    /69   Pulse 84   Temp 36.8 °C (98.2 °F)   Resp 18   Ht 1.6 m (5' 3\")   Wt 71.8 kg (158 lb 4.8 oz)   SpO2 97% Comment: Oxygen nc 6L  BMI 28.04 kg/m²      Physical Exam  Constitutional:       Appearance: Normal appearance. He is obese. He is ill-appearing.   HENT:      Head: Normocephalic.      Right Ear: External ear normal.      Left Ear: External ear normal.      Nose: Nose normal.      Mouth/Throat:      Mouth: Mucous membranes are moist.   Eyes:      Extraocular Movements: Extraocular movements intact.      Conjunctiva/sclera: Conjunctivae normal.      Pupils: Pupils are equal, round, and reactive to light.   Cardiovascular:      Rate and Rhythm: Normal rate and regular rhythm.      Pulses: Normal pulses.      Heart sounds: Murmur heard. "   Pulmonary:      Effort: Pulmonary effort is normal.      Comments: Diminished breath sounds  Abdominal:      General: Bowel sounds are normal. There is no distension.      Palpations: Abdomen is soft.      Tenderness: There is no abdominal tenderness.   Genitourinary:     Comments: Not examined  Musculoskeletal:         General: Swelling and tenderness present. Normal range of motion.      Cervical back: Normal range of motion and neck supple.      Comments: Generalized weakness   Skin:     General: Skin is warm and dry.      Capillary Refill: Capillary refill takes less than 2 seconds.      Findings: Erythema present.      Comments: LUE erythema, edematous   Neurological:      General: No focal deficit present.      Mental Status: He is alert and oriented to person, place, and time. Mental status is at baseline.   Psychiatric:         Mood and Affect: Mood normal.         Behavior: Behavior normal.          ADVANCED CARE PLANNING: Discussion done with the patient and family if available regarding code status, diagnosis, and the prognosis of the patient.     CODE STATUS: DNRCCA/DNI    DISCHARGE PLANNING:  Patient will be discharge home when goals are met.   Discharge planner to communicate ongoing updates regarding discharge plan.     RECENT HOSPITALIZATION RECORDS REVIEWED:   All laboratories, diagnostic tests, progress notes, consultation notes, and discharge notes available reviewed from recent hospitalization.     Allergies   Allergen Reactions    Gemfibrozil Other     Adverse reaction       MEDICATION LIST FROM RECENT HOSPITALIZATION:    albuterol sulfate 2.5 mg /3 mL (0.083 %) Daily  Patient not taking: Reported on 4/3/2025    Atenolol 50 mg daily  Breztri 160-9-4.8 mcg 2 puffs bid  Primidone 50 mg daily  Pravastatin 40 mg daily  Doxy 100 mg BID til 4/21/25  Ibuprofen PRN  Folic acid 1 mg daily  Culturelle one bid  Mirtazapine 15 mg daily  MTV daily    MEDICATIONS UPDATED AND RECONCILED AT THE  FACILITY:  Please see Nursing facility medication list.    Assessment/Plan    Problem List Items Addressed This Visit       Alcohol abuse    COPD (chronic obstructive pulmonary disease) (Multi)    HLD (hyperlipidemia)    HTN (hypertension)    Tremor    Generalized weakness    Thrombophlebitis    Cellulitis of left upper extremity - Primary     Other Visit Diagnoses       Chronic hypoxic respiratory failure, on home oxygen therapy                Skin Integrity:  Nursing to monitor skin integrity as patient is at risk for pressure injuries.  Turn and reposition Q 2 hours or more.  Air mattress and when up in chair cushion reducing device.  Dietician to evaluate and recommend.  Nutritional supplement to be implemented if needed.  Please monitor skin integrity and other pressure areas.    WOUNDS:  Wound care per nursing  LUE: Cleanse left hand skin tear with normal saline apply Skin-Prep and Xeroform wrap with Kerlix and Ace wrap change daily and as needed  See Facility notes for measurements/assessment of wound.  Referral to wound clinic or wound team here at the facility to follow if appropriate.    PLAN:   Recent nursing evaluation and notes were reviewed.     Cellulitis:  Doxy 100 mg BID til 4/21/25  ACE wrap, wound care to hand  Lab Results   Component Value Date    WBC 6.6 04/06/2025    HGB 10.9 (L) 04/06/2025    HCT 35.3 (L) 04/06/2025     (H) 04/06/2025     04/06/2025     Tremors:  Primidone 100 mg QHS    Severe COPD:  Monitor resp status  Oxygen 6 L per baseline at home  Con Albuterol NEBs routine and PRN  Cont Breztri routine    HTN:   Monitor and follow BP readings.   Continue home meds - atenolol   Labs to be done intermittently and adjust meds as needed.  BP readings reviewed -  110/60s  Lab Results   Component Value Date    GLUCOSE 95 04/06/2025    CALCIUM 8.6 04/06/2025     04/06/2025    K 3.9 04/06/2025    CO2 34 (H) 04/06/2025     04/06/2025    BUN 9 04/06/2025    CREATININE  1.00 04/06/2025   Avoid nephrotoxic drugs.  Discussion with Rodrigo Conte regarding goals of BP readings to be less than 120/80, daily monitoring, medication compliance and life style changes.     Weakness and physical deconditioning:   PT/OT/ST to evaluate and treat to maximize strength, function, and endurance all while maintaining safety.       Any decline or change in condition needs to be communicated with the physician or myself.    Discussion with nursing staff regarding ongoing care and management.  Communication regarding patients status, overall condition, changes with plan (medications or treatments), and any questions from family completed if present.  If family not available, would communicate in person or via phone if needed.   We will continue with the plan and medications noted above.    We will continue to follow the patient here at the facility.    *Please note that nursing facility notes, facility EMR, outside laboratory agency, and  EMR do not interface.     Completion of the note was done through Dragon voice recognition technology and may include unintended or grammatical errors which may not have been recognized when finalizing the note.     Time:   I spent 45 minutes or greater with the patient. Greater than 50% of this time was spent in counseling and or coordination of care. The time includes prep time of reviewing vital signs, report from direct nursing staff and or therapists, hospital documentation, reviewing labs, radiographs, diagnostic tests and or consultations, time directly spent with the patient interviewing, examining, and education regarding diagnosis, treatments, and medications, as well as documentation in the electronic medical record, and reviewing the plan of care and any new orders with the patient, nursing staff and other staff directly related to the patients care.       Krysta Huggins, APRN-CNP

## 2025-04-08 NOTE — PROGRESS NOTES
Subjective   Patient ID: Rodrigo Conte is a 79 y.o. male who is acute skilled care and presents for initial visit for skilled nursing.    79-year-old male patient has been admitted after 5 days of hospitalization for pain, swelling, erythema of left hand, they did a CT, there was a changes of cellulitis without any myositis, there was no deep-seated infection.  Patient was treated with IV antibiotics, patient has history of alcohol dependence not necessarily abuse about 3 beers every day he could drink, he has history of obstructive lung disease typically he is on 6 L of nasal oxygen.  During hospitalization patient's condition was stable, patient is very frail, his skin health is very poor, patient has been showing significant evidence of solar keratosis or telangiectasia.  When I saw this patient he was sitting upright, he remains on nasal oxygen.  Hemoglobin was 10.6, other reports and information from hospitalization were reviewed, there is ordered 2 cleans left hand skin tear with normal saline and apply Xeroform gauze wrap with Kerlix and Ace bandage.  Patient is calm and comfortable, yesterday's nurse practitioner's evaluation reviewed and agreed upon.  Because of inability to ambulate patient is brought over here for skilled nursing and rehabilitation.  Patient's ambulatory care records from outside primary care evaluation were reviewed, latest set of labs were reviewed.         Review of Systems   Constitutional:  Positive for activity change and fatigue.   Eyes: Negative.    Respiratory:  Negative for cough, choking and shortness of breath.    Cardiovascular:  Positive for leg swelling. Negative for chest pain.   Gastrointestinal:  Negative for abdominal pain, diarrhea, nausea and vomiting.   Genitourinary:  Negative for difficulty urinating.   Musculoskeletal:  Positive for arthralgias.   Skin:  Positive for color change.   Neurological:  Positive for tremors and weakness.   Psychiatric/Behavioral:  Negative  for agitation, behavioral problems and confusion.        Objective   /80   Pulse 79     Physical Exam  Constitutional:       Appearance: Normal appearance. He is normal weight.      Comments: Weak and tired appearing   HENT:      Head: Normocephalic.   Eyes:      Conjunctiva/sclera: Conjunctivae normal.   Cardiovascular:      Rate and Rhythm: Normal rate and regular rhythm.      Heart sounds: Murmur heard.   Pulmonary:      Effort: Pulmonary effort is normal.      Breath sounds: Normal breath sounds.   Abdominal:      General: Abdomen is flat. There is no distension.      Palpations: Abdomen is soft.      Tenderness: There is no abdominal tenderness.   Musculoskeletal:         General: Tenderness and deformity present.      Cervical back: Neck supple. Tenderness present.   Skin:     General: Skin is warm and dry.      Findings: Bruising and lesion present.   Neurological:      Mental Status: He is oriented to person, place, and time. Mental status is at baseline.   Psychiatric:         Mood and Affect: Mood normal.         Behavior: Behavior normal.         Assessment/Plan   Problem List Items Addressed This Visit             ICD-10-CM    COPD (chronic obstructive pulmonary disease) (Multi) J44.9    HTN (hypertension) - Primary I10    Tremor R25.1    Generalized weakness R53.1    Thrombophlebitis I80.9    Cellulitis of left upper extremity L03.114   Patient is frail, skin health is poor, excoriations, multiple scratch marks, excessively dry skin, poor skin elasticity.  Medications include albuterol via nebulizers, atenolol, Pristiq, doxycycline till 21st, folic acid, ibuprofen which will be reducing the dosage, mirtazapine, pravastatin, primidone.  I do not see any tremors.  Patient could not tell me about the duration of primidone treatment.  Area of concern has been wrapped, cellulitis will resolve hopefully, BP readings will be monitored.  6 L of nasal oxygen to be continued, patient is weak, physical  therapy, Occupational Therapy evaluation has been done, other routine safety precautions has to be taken as per the facility protocol, other diagnoses were reviewed, patient is susceptible for fall, he cannot stand, needs two-person assist.  Standard care orders for the facility admission reviewed.  Rest of therapy were reviewed, laboratories will be done as per our routine.  Periodically cellulitic area needs to be observed, this cellulitis can become rapidly spreading cellulitis or abscess even though patient is on systemic antibiotics.  Any breathing compromise needs to be noted.  Short-term skilled nursing and rehabilitation is warranted at this time.     Goals    None

## 2025-04-08 NOTE — LETTER
Patient: Rodrigo Conte  : 1945    Encounter Date: 2025    Subjective  Patient ID: Rodrigo Conte is a 79 y.o. male who is acute skilled care and presents for initial visit for skilled nursing.    79-year-old male patient has been admitted after 5 days of hospitalization for pain, swelling, erythema of left hand, they did a CT, there was a changes of cellulitis without any myositis, there was no deep-seated infection.  Patient was treated with IV antibiotics, patient has history of alcohol dependence not necessarily abuse about 3 beers every day he could drink, he has history of obstructive lung disease typically he is on 6 L of nasal oxygen.  During hospitalization patient's condition was stable, patient is very frail, his skin health is very poor, patient has been showing significant evidence of solar keratosis or telangiectasia.  When I saw this patient he was sitting upright, he remains on nasal oxygen.  Hemoglobin was 10.6, other reports and information from hospitalization were reviewed, there is ordered 2 cleans left hand skin tear with normal saline and apply Xeroform gauze wrap with Kerlix and Ace bandage.  Patient is calm and comfortable, yesterday's nurse practitioner's evaluation reviewed and agreed upon.  Because of inability to ambulate patient is brought over here for skilled nursing and rehabilitation.  Patient's ambulatory care records from outside primary care evaluation were reviewed, latest set of labs were reviewed.         Review of Systems   Constitutional:  Positive for activity change and fatigue.   Eyes: Negative.    Respiratory:  Negative for cough, choking and shortness of breath.    Cardiovascular:  Positive for leg swelling. Negative for chest pain.   Gastrointestinal:  Negative for abdominal pain, diarrhea, nausea and vomiting.   Genitourinary:  Negative for difficulty urinating.   Musculoskeletal:  Positive for arthralgias.   Skin:  Positive for color change.   Neurological:   Positive for tremors and weakness.   Psychiatric/Behavioral:  Negative for agitation, behavioral problems and confusion.        Objective  /80   Pulse 79     Physical Exam  Constitutional:       Appearance: Normal appearance. He is normal weight.      Comments: Weak and tired appearing   HENT:      Head: Normocephalic.   Eyes:      Conjunctiva/sclera: Conjunctivae normal.   Cardiovascular:      Rate and Rhythm: Normal rate and regular rhythm.      Heart sounds: Murmur heard.   Pulmonary:      Effort: Pulmonary effort is normal.      Breath sounds: Normal breath sounds.   Abdominal:      General: Abdomen is flat. There is no distension.      Palpations: Abdomen is soft.      Tenderness: There is no abdominal tenderness.   Musculoskeletal:         General: Tenderness and deformity present.      Cervical back: Neck supple. Tenderness present.   Skin:     General: Skin is warm and dry.      Findings: Bruising and lesion present.   Neurological:      Mental Status: He is oriented to person, place, and time. Mental status is at baseline.   Psychiatric:         Mood and Affect: Mood normal.         Behavior: Behavior normal.         Assessment/Plan  Problem List Items Addressed This Visit             ICD-10-CM    COPD (chronic obstructive pulmonary disease) (Multi) J44.9    HTN (hypertension) - Primary I10    Tremor R25.1    Generalized weakness R53.1    Thrombophlebitis I80.9    Cellulitis of left upper extremity L03.114   Patient is frail, skin health is poor, excoriations, multiple scratch marks, excessively dry skin, poor skin elasticity.  Medications include albuterol via nebulizers, atenolol, Pristiq, doxycycline till 21st, folic acid, ibuprofen which will be reducing the dosage, mirtazapine, pravastatin, primidone.  I do not see any tremors.  Patient could not tell me about the duration of primidone treatment.  Area of concern has been wrapped, cellulitis will resolve hopefully, BP readings will be monitored.   6 L of nasal oxygen to be continued, patient is weak, physical therapy, Occupational Therapy evaluation has been done, other routine safety precautions has to be taken as per the facility protocol, other diagnoses were reviewed, patient is susceptible for fall, he cannot stand, needs two-person assist.  Standard care orders for the facility admission reviewed.  Rest of therapy were reviewed, laboratories will be done as per our routine.  Periodically cellulitic area needs to be observed, this cellulitis can become rapidly spreading cellulitis or abscess even though patient is on systemic antibiotics.  Any breathing compromise needs to be noted.  Short-term skilled nursing and rehabilitation is warranted at this time.     Goals    None           Electronically Signed By: Juan Luis Loaiza MD   4/8/25  6:26 PM

## 2025-04-10 VITALS
BODY MASS INDEX: 28.05 KG/M2 | TEMPERATURE: 98.2 F | HEIGHT: 63 IN | RESPIRATION RATE: 18 BRPM | OXYGEN SATURATION: 97 % | SYSTOLIC BLOOD PRESSURE: 118 MMHG | HEART RATE: 84 BPM | WEIGHT: 158.3 LBS | DIASTOLIC BLOOD PRESSURE: 69 MMHG

## 2025-04-17 ENCOUNTER — NURSING HOME VISIT (OUTPATIENT)
Dept: POST ACUTE CARE | Facility: EXTERNAL LOCATION | Age: 80
End: 2025-04-17
Payer: MEDICARE

## 2025-04-17 DIAGNOSIS — J96.11 CHRONIC HYPOXIC RESPIRATORY FAILURE, ON HOME OXYGEN THERAPY: ICD-10-CM

## 2025-04-17 DIAGNOSIS — R53.1 GENERAL WEAKNESS: ICD-10-CM

## 2025-04-17 DIAGNOSIS — L03.114 CELLULITIS OF LEFT UPPER EXTREMITY: Primary | ICD-10-CM

## 2025-04-17 DIAGNOSIS — E78.2 MIXED HYPERLIPIDEMIA: ICD-10-CM

## 2025-04-17 DIAGNOSIS — R53.1 GENERALIZED WEAKNESS: ICD-10-CM

## 2025-04-17 DIAGNOSIS — I80.9 THROMBOPHLEBITIS: ICD-10-CM

## 2025-04-17 DIAGNOSIS — F10.10 ALCOHOL ABUSE: ICD-10-CM

## 2025-04-17 DIAGNOSIS — J44.9 CHRONIC OBSTRUCTIVE PULMONARY DISEASE, UNSPECIFIED COPD TYPE (MULTI): ICD-10-CM

## 2025-04-17 DIAGNOSIS — R25.1 TREMOR: ICD-10-CM

## 2025-04-17 DIAGNOSIS — Z99.81 CHRONIC HYPOXIC RESPIRATORY FAILURE, ON HOME OXYGEN THERAPY: ICD-10-CM

## 2025-04-17 DIAGNOSIS — I10 PRIMARY HYPERTENSION: ICD-10-CM

## 2025-04-17 PROCEDURE — 99316 NF DSCHRG MGMT 30 MIN+: CPT | Performed by: NURSE PRACTITIONER

## 2025-04-17 NOTE — LETTER
Patient: Rodrigo Conte  : 1945    Encounter Date: 2025    Name: Rodrigo Conte  YOB: 1945    DISCHARGE VISIT: [facility],   [facility]  - 25: LUE cellulitis, superficial vein thrombosis, h/o gout, essential tremor, Alcohol use disorder, Severe COPD, and ambulatory dysfunction     SUBJECTIVE:  Mr. Currie is a 79-year-old male admitted to [facility] for skilled nursing after a recent hospitalization for left upper extremity cellulitis and associated complications. His past medical history is significant for COPD with chronic hypoxic respiratory failure on 6?L home oxygen, essential tremors, hyperlipidemia, and alcohol use disorder. He was brought to the hospital by his daughter for left hand swelling and weakness, where he was treated with IV vancomycin and transitioned to 14 days of doxycycline per infectious disease recommendations. A vascular ultrasound revealed a partially occlusive basilic vein thrombosis, and he experienced a gout flare treated with colchicine; steroids were administered for his COPD exacerbation, and he had no alcohol withdrawal symptoms during his stay.   He is now here at [facility] for physical and occupational therapy.   On exam, he appears chronically ill, but better than 1st visit. He is sitting up in WC. No distress noted. Pt has no complaints.   Nursing reports no concerns.   Discussion to be done by nursing with Rodrigo Conte and family/friend representative if present regarding discharge - follow up with PCP and other speciality physicians as instructed or as scheduled, compliance with home medications, and safety within the home.   Nursing to review discharge instructions home medications prior to discharging home.     REVIEW OF SYSTEMS:   All review of systems are negative unless otherwise stated above under subjective.    LABS REVIEWED AT FACILITY:  CBC and Differential  REPORTED 2025 10:26  White Blood Cell Count   6.95 k/uL 3.70-11.00 FH  RBC L 3.23 m/uL  4.20-6.00 FH  Hemoglobin L 10.2 g/dL 13.0-17.0 FH  Hematocrit L 31.8 % 39.0-51.0 FH  MCV   98.5 fL 80.0-100.0 FH  MCH   31.6 pg 26.0-34.0 FH  MCHC   32.1 g/dL 30.5-36.0 FH  RDW-CV   12.2 % 11.5-15.0 FH  Platelet Count   235 k/uL 150-400 FH  MPV   9.4 fL 9.0-12.7 FH  Neut%   66.4 % FH  Abs Neut   4.61 k/uL 1.45-7.50 FH  Lymph%   17.7 % FH  Abs Lymph   1.23 k/uL 1.00-4.00 FH  Mono%   9.6 % FH  Abs Mono   0.67 k/uL <0.87 FH  Eosin%   5.3 % FH  Abs Eosin   0.37 k/uL <0.46 FH  Baso%   0.6 % FH  Abs Baso   0.04 k/uL <0.11 FH  Immature Gran %%   0.4 % FH  Abs Immature Gran   0.03 k/uL <0.10 FH  NRBC   0.0 /100 WBC FH  Absolute nRBC   <0.01 k/uL <0.01 FH  Diff Type   Auto     FH         Comp Metabolic Panel  REPORTED 04/17/2025 11:05  Protein, Total   6.9 g/dL 6.3-8.0 FH  Albumin L 3.6 g/dL 3.9-4.9 FH  Calcium, Total   9.1 mg/dL 8.5-10.2 FH  Bilirubin, Total L <0.2 mg/dL 0.2-1.3 FH  Alkaline Phosphatase   56 U/L  FH  AST   23 U/L 14-40 FH  ALT   17 U/L 10-54 FH  Glucose   80 mg/dL 74-99 FH  BUN   17 mg/dL 9-24 FH  Creatinine   0.92 mg/dL 0.73-1.22 FH  Sodium   141 mmol/L 136-144 FH  Potassium   4.4 mmol/L 3.7-5.1 FH  Chloride   98 mmol/L  FH  CO2   30 mmol/L 22-30 FH  Anion Gap   13 mmol/L 8-15 FH  Estimated Glomerular Filtration Rate   85 mL/min/1.73 meters squared >=60     RX Allergies[1]    Living will related issues reviewed-Code status: DNRCCA/DNI    OBJECTIVE:  /76   Pulse 81   Physical Exam  Constitutional:       Appearance: Normal appearance. He is obese. He is ill-appearing.   HENT:      Head: Normocephalic.      Right Ear: External ear normal.      Left Ear: External ear normal.      Nose: Nose normal.      Mouth/Throat:      Mouth: Mucous membranes are moist.   Eyes:      Extraocular Movements: Extraocular movements intact.      Conjunctiva/sclera: Conjunctivae normal.      Pupils: Pupils are equal, round, and reactive to light.   Cardiovascular:      Rate and Rhythm: Normal rate and regular  rhythm.      Pulses: Normal pulses.      Heart sounds: Murmur heard.   Pulmonary:      Effort: Pulmonary effort is normal.      Comments: Diminished breath sounds  Abdominal:      General: Bowel sounds are normal. There is no distension.      Palpations: Abdomen is soft.      Tenderness: There is no abdominal tenderness.   Genitourinary:     Comments: Not examined  Musculoskeletal:         General: Swelling and tenderness present. Normal range of motion.      Cervical back: Normal range of motion and neck supple.      Comments: Generalized weakness   Skin:     General: Skin is warm and dry.      Capillary Refill: Capillary refill takes less than 2 seconds.      Findings: Erythema present.      Comments: LUE erythema, edematous   Neurological:      General: No focal deficit present.      Mental Status: He is alert and oriented to person, place, and time. Mental status is at baseline.   Psychiatric:         Mood and Affect: Mood normal.         Behavior: Behavior normal.     Assessment/Plan  Problem List Items Addressed This Visit       Alcohol abuse    COPD (chronic obstructive pulmonary disease) (Multi)    HLD (hyperlipidemia)    HTN (hypertension)    Tremor    Generalized weakness    Thrombophlebitis    Cellulitis of left upper extremity - Primary     Other Visit Diagnoses         Chronic hypoxic respiratory failure, on home oxygen therapy          General weakness                PLAN:  Pt has been seen for discharge visit.  The patient has been here at facility for PT/OT/ST to maximize strength, function, endurance and safety.  Recent therapy notes reviewed.  The patient is participating in therapy.   Rodrigo Conte is making progress to the best of his/her ability.   Please see PT/OT/ST notes in the facility for detailed information regarding progression of patients progress.   Recent nursing evaluation and notes were reviewed.   Medication list reviewed here at the facility.   The medication list will need to be  updated in the electronic record by the patients primary care provider in the community upon follow up in the office.     Cellulitis:  Doxy 100 mg BID til 4/21/25  ACE wrap, wound care to hand    WOUNDS:  Wound care per nursing  LUE: Cleanse left hand skin tear with normal saline apply Skin-Prep and Xeroform wrap with Kerlix and Ace wrap change daily and as needed  See Facility notes for measurements/assessment of wound.  Referral to wound clinic or wound team here at the facility to follow if appropriate.    Tremors:  Primidone 100 mg QHS     Severe COPD:  Monitor resp status - 95% on NC  Oxygen 6 L per baseline at home  Con Albuterol NEBs routine and PRN  Cont Breztri routine     HTN:   Monitor and follow BP readings.   Continue home meds - atenolol   Labs to be done intermittently and adjust meds as needed.  BP readings reviewed -  120/70s  Avoid nephrotoxic drugs.  Discussion with Rodrigo Conte regarding goals of BP readings to be less than 120/80, daily monitoring, medication compliance and life style changes.      Weakness and physical deconditioning:   PT/OT/ST ongoing and to follow home w/HHC     Skin integrity:  Nursing to monitor skin integrity as patient is at risk for pressure injuries.  Turn and reposition Q2 hours or more.  Air mattress and when up in chair cushion reducing device.  Dietician to evaluate and recommend.  Labs done intermittently and followed.  Nutritional supplement to be implemented if needed.  Please monitor skin integrity and other pressure areas.    Any decline or change in condition needs to be communicated with the physician or myself.  Discussion with nursing staff regarding ongoing care, management, and discharge planning.   Communication regarding patients status, overall condition, changes with plan (medications or treatments), and any questions from family completed if present.  If family not available, would communicate in person or via phone if needed.   We will continue with  the plan and medications noted above until discharged home.    We will continue to follow the patient here at the facility until discharged home.     DISCHARGE PLANNING    Patient to be discharged home on the date discussed under subjective.   Patient to follow up with PCP in 1-2 weeks after discharge from facility.   Patient to follow up with any Speciality physicians as directed after discharge.  If desired and in agreement, [facility] to follow after discharge from the facility for continued PT/OT and Nursing.    Medication list for home:  Please see facility medication list as medications may have changed from recent hospitalization and skilled stay.    OARRS Report if narcotics are prescribed upon discharge:   If indicated, I have personally reviewed the OARRS report for Rodrigo Conte and I have considered the risks of abuse, dependence, addiction, and diversion.    *Please note that nursing facility notes, facility EMR, outside laboratory agency, and  EMR do not interface.     Completion of the note was done through Dragon voice recognition technology and may include unintended or grammatical errors which may not have been recognized when finalizing the note.     Time:   I spent 31 minutes or greater with the patient reviewing discharge information which include compliance of medications, follow up appointments with PCP and or Speciality physicians. Greater than 50% of this time was spent in counseling and or coordination of care.       ZAIN Anderson          Electronically Signed By: ZAIN Anderson   5/25/25 11:10 AM       [1]  Allergies  Allergen Reactions   • Gemfibrozil Other     Adverse reaction

## 2025-04-30 ENCOUNTER — FOLLOW-UP (OUTPATIENT)
Facility: CLINIC | Age: 80
End: 2025-04-30
Payer: MEDICARE

## 2025-04-30 VITALS
TEMPERATURE: 97.7 F | DIASTOLIC BLOOD PRESSURE: 60 MMHG | HEIGHT: 63 IN | WEIGHT: 160 LBS | HEART RATE: 85 BPM | BODY MASS INDEX: 28.35 KG/M2 | SYSTOLIC BLOOD PRESSURE: 105 MMHG | RESPIRATION RATE: 20 BRPM

## 2025-04-30 DIAGNOSIS — I80.9 THROMBOPHLEBITIS: Primary | ICD-10-CM

## 2025-04-30 DIAGNOSIS — M70.20 OLECRANON BURSITIS, UNSPECIFIED LATERALITY: ICD-10-CM

## 2025-04-30 DIAGNOSIS — L03.90 CELLULITIS, UNSPECIFIED CELLULITIS SITE: ICD-10-CM

## 2025-04-30 PROCEDURE — 99213 OFFICE O/P EST LOW 20 MIN: CPT | Performed by: INTERNAL MEDICINE

## 2025-04-30 ASSESSMENT — PAIN SCALES - GENERAL: PAINLEVEL_OUTOF10: 0-NO PAIN

## 2025-04-30 NOTE — PROGRESS NOTES
Infectious Disease Progress Note       4/30/2025    79-year-old male admitted with progressive left upper extremity swelling and pain with erythema ove  Apparently there is a history of scratching his hand causing it to bleed which prompted further evaluation in the emergency department.  No leukocytosis or fevers or chills      Patient improved on IV antibiotics he was sent on doxycycline with the nursing home where he had subsequent improvement.  There has been full resolution of the edema in the left upper extremity there is still still slight skin tear.  The hand he has now at home daughter is helping him    Lab Results   Component Value Date    WBC 6.6 04/06/2025    HGB 10.9 (L) 04/06/2025    HCT 35.3 (L) 04/06/2025     (H) 04/06/2025     04/06/2025     Lab Results   Component Value Date    GLUCOSE 95 04/06/2025    CALCIUM 8.6 04/06/2025     04/06/2025    K 3.9 04/06/2025    CO2 34 (H) 04/06/2025     04/06/2025    BUN 9 04/06/2025    CREATININE 1.00 04/06/2025       WBC trends are being monitored. Antibiotic doses are being adjusted per most recent renal labs.     Vitals:    04/30/25 1050   BP: 105/60   Pulse: 85   Resp: 20   Temp: 36.5 °C (97.7 °F)     Patient is awake and alert  NAD  Neck supple  Heart S1S2  Chest: Equal expansion, bilaterally clear to auscultation  Abdomen: soft, ND, NTTP, no guarding  Extrem: No erythema no swelling small superficial wound see pic     skin: Lots of skin bruising in general  Neuro: CNS intact  Affect appropriate and patient is interactive        Patient Active Problem List   Diagnosis    Alcohol abuse    Alcohol withdrawal (Multi)    COPD (chronic obstructive pulmonary disease) (Multi)    COVID-19 virus infection    Fall, accidental    HLD (hyperlipidemia)    HTN (hypertension)    Osteoarthritis of right foot joint    Seborrheic dermatitis    Tremor    Elevated LFTs    Generalized weakness    Thrombophlebitis    Cellulitis of left upper extremity            ASSESSMENT:  Left upper extremity cellulitis with left olecranon bursitis  Superficial vein thrombosis with partially occlusive basilic vein thrombosis noted on left upper extremity on vascular ultrasound  History of COPD, gout, essential tremor, alcohol dependence (2-3 beers daily)      PLAN:  Finished antibiotics patient will continue with daughter with small amount of wound care dressing,  Follow-up as needed    Shreyas Arnold MD

## 2025-05-25 VITALS — SYSTOLIC BLOOD PRESSURE: 129 MMHG | HEART RATE: 81 BPM | DIASTOLIC BLOOD PRESSURE: 76 MMHG

## 2025-05-25 NOTE — PROGRESS NOTES
Name: Rodrigo Conte  YOB: 1945    DISCHARGE VISIT: [facility],   [facility] 4/1 - 4/6/25: LUE cellulitis, superficial vein thrombosis, h/o gout, essential tremor, Alcohol use disorder, Severe COPD, and ambulatory dysfunction     SUBJECTIVE:  Mr. Currie is a 79-year-old male admitted to [facility] for skilled nursing after a recent hospitalization for left upper extremity cellulitis and associated complications. His past medical history is significant for COPD with chronic hypoxic respiratory failure on 6?L home oxygen, essential tremors, hyperlipidemia, and alcohol use disorder. He was brought to the hospital by his daughter for left hand swelling and weakness, where he was treated with IV vancomycin and transitioned to 14 days of doxycycline per infectious disease recommendations. A vascular ultrasound revealed a partially occlusive basilic vein thrombosis, and he experienced a gout flare treated with colchicine; steroids were administered for his COPD exacerbation, and he had no alcohol withdrawal symptoms during his stay.   He is now here at [facility] for physical and occupational therapy.   On exam, he appears chronically ill, but better than 1st visit. He is sitting up in WC. No distress noted. Pt has no complaints.   Nursing reports no concerns.   Discussion to be done by nursing with Rodrigo Conte and family/friend representative if present regarding discharge - follow up with PCP and other speciality physicians as instructed or as scheduled, compliance with home medications, and safety within the home.   Nursing to review discharge instructions home medications prior to discharging home.     REVIEW OF SYSTEMS:   All review of systems are negative unless otherwise stated above under subjective.    LABS REVIEWED AT FACILITY:  CBC and Differential  REPORTED 04/17/2025 10:26  White Blood Cell Count   6.95 k/uL 3.70-11.00 FH  RBC L 3.23 m/uL 4.20-6.00 FH  Hemoglobin L 10.2 g/dL 13.0-17.0 FH  Hematocrit L 31.8 %  39.0-51.0 FH  MCV   98.5 fL 80.0-100.0 FH  MCH   31.6 pg 26.0-34.0 FH  MCHC   32.1 g/dL 30.5-36.0 FH  RDW-CV   12.2 % 11.5-15.0 FH  Platelet Count   235 k/uL 150-400 FH  MPV   9.4 fL 9.0-12.7 FH  Neut%   66.4 % FH  Abs Neut   4.61 k/uL 1.45-7.50 FH  Lymph%   17.7 % FH  Abs Lymph   1.23 k/uL 1.00-4.00 FH  Mono%   9.6 % FH  Abs Mono   0.67 k/uL <0.87 FH  Eosin%   5.3 % FH  Abs Eosin   0.37 k/uL <0.46 FH  Baso%   0.6 % FH  Abs Baso   0.04 k/uL <0.11 FH  Immature Gran %%   0.4 % FH  Abs Immature Gran   0.03 k/uL <0.10 FH  NRBC   0.0 /100 WBC FH  Absolute nRBC   <0.01 k/uL <0.01 FH  Diff Type   Auto     FH         Comp Metabolic Panel  REPORTED 04/17/2025 11:05  Protein, Total   6.9 g/dL 6.3-8.0 FH  Albumin L 3.6 g/dL 3.9-4.9 FH  Calcium, Total   9.1 mg/dL 8.5-10.2 FH  Bilirubin, Total L <0.2 mg/dL 0.2-1.3 FH  Alkaline Phosphatase   56 U/L  FH  AST   23 U/L 14-40 FH  ALT   17 U/L 10-54 FH  Glucose   80 mg/dL 74-99 FH  BUN   17 mg/dL 9-24 FH  Creatinine   0.92 mg/dL 0.73-1.22 FH  Sodium   141 mmol/L 136-144 FH  Potassium   4.4 mmol/L 3.7-5.1 FH  Chloride   98 mmol/L  FH  CO2   30 mmol/L 22-30 FH  Anion Gap   13 mmol/L 8-15 FH  Estimated Glomerular Filtration Rate   85 mL/min/1.73 meters squared >=60     RX Allergies[1]    Living will related issues reviewed-Code status: DNRCCA/DNI    OBJECTIVE:  /76   Pulse 81   Physical Exam  Constitutional:       Appearance: Normal appearance. He is obese. He is ill-appearing.   HENT:      Head: Normocephalic.      Right Ear: External ear normal.      Left Ear: External ear normal.      Nose: Nose normal.      Mouth/Throat:      Mouth: Mucous membranes are moist.   Eyes:      Extraocular Movements: Extraocular movements intact.      Conjunctiva/sclera: Conjunctivae normal.      Pupils: Pupils are equal, round, and reactive to light.   Cardiovascular:      Rate and Rhythm: Normal rate and regular rhythm.      Pulses: Normal pulses.      Heart sounds: Murmur heard.    Pulmonary:      Effort: Pulmonary effort is normal.      Comments: Diminished breath sounds  Abdominal:      General: Bowel sounds are normal. There is no distension.      Palpations: Abdomen is soft.      Tenderness: There is no abdominal tenderness.   Genitourinary:     Comments: Not examined  Musculoskeletal:         General: Swelling and tenderness present. Normal range of motion.      Cervical back: Normal range of motion and neck supple.      Comments: Generalized weakness   Skin:     General: Skin is warm and dry.      Capillary Refill: Capillary refill takes less than 2 seconds.      Findings: Erythema present.      Comments: LUE erythema, edematous   Neurological:      General: No focal deficit present.      Mental Status: He is alert and oriented to person, place, and time. Mental status is at baseline.   Psychiatric:         Mood and Affect: Mood normal.         Behavior: Behavior normal.     Assessment/Plan   Problem List Items Addressed This Visit       Alcohol abuse    COPD (chronic obstructive pulmonary disease) (Multi)    HLD (hyperlipidemia)    HTN (hypertension)    Tremor    Generalized weakness    Thrombophlebitis    Cellulitis of left upper extremity - Primary     Other Visit Diagnoses         Chronic hypoxic respiratory failure, on home oxygen therapy          General weakness                PLAN:  Pt has been seen for discharge visit.  The patient has been here at facility for PT/OT/ST to maximize strength, function, endurance and safety.  Recent therapy notes reviewed.  The patient is participating in therapy.   Rodrigo Conte is making progress to the best of his/her ability.   Please see PT/OT/ST notes in the facility for detailed information regarding progression of patients progress.   Recent nursing evaluation and notes were reviewed.   Medication list reviewed here at the facility.   The medication list will need to be updated in the electronic record by the patients primary care provider  in the community upon follow up in the office.     Cellulitis:  Doxy 100 mg BID til 4/21/25  ACE wrap, wound care to hand    WOUNDS:  Wound care per nursing  LUE: Cleanse left hand skin tear with normal saline apply Skin-Prep and Xeroform wrap with Kerlix and Ace wrap change daily and as needed  See Facility notes for measurements/assessment of wound.  Referral to wound clinic or wound team here at the facility to follow if appropriate.    Tremors:  Primidone 100 mg QHS     Severe COPD:  Monitor resp status - 95% on NC  Oxygen 6 L per baseline at home  Con Albuterol NEBs routine and PRN  Cont Breztri routine     HTN:   Monitor and follow BP readings.   Continue home meds - atenolol   Labs to be done intermittently and adjust meds as needed.  BP readings reviewed -  120/70s  Avoid nephrotoxic drugs.  Discussion with Rodrigo Conte regarding goals of BP readings to be less than 120/80, daily monitoring, medication compliance and life style changes.      Weakness and physical deconditioning:   PT/OT/ST ongoing and to follow home w/HHC     Skin integrity:  Nursing to monitor skin integrity as patient is at risk for pressure injuries.  Turn and reposition Q2 hours or more.  Air mattress and when up in chair cushion reducing device.  Dietician to evaluate and recommend.  Labs done intermittently and followed.  Nutritional supplement to be implemented if needed.  Please monitor skin integrity and other pressure areas.    Any decline or change in condition needs to be communicated with the physician or myself.  Discussion with nursing staff regarding ongoing care, management, and discharge planning.   Communication regarding patients status, overall condition, changes with plan (medications or treatments), and any questions from family completed if present.  If family not available, would communicate in person or via phone if needed.   We will continue with the plan and medications noted above until discharged home.    We will  continue to follow the patient here at the facility until discharged home.     DISCHARGE PLANNING    Patient to be discharged home on the date discussed under subjective.   Patient to follow up with PCP in 1-2 weeks after discharge from facility.   Patient to follow up with any Speciality physicians as directed after discharge.  If desired and in agreement, [facility] to follow after discharge from the facility for continued PT/OT and Nursing.    Medication list for home:  Please see facility medication list as medications may have changed from recent hospitalization and skilled stay.    OARRS Report if narcotics are prescribed upon discharge:   If indicated, I have personally reviewed the OARRS report for Rodrigo Conte and I have considered the risks of abuse, dependence, addiction, and diversion.    *Please note that nursing facility notes, facility EMR, outside laboratory agency, and  EMR do not interface.     Completion of the note was done through Dragon voice recognition technology and may include unintended or grammatical errors which may not have been recognized when finalizing the note.     Time:   I spent 31 minutes or greater with the patient reviewing discharge information which include compliance of medications, follow up appointments with PCP and or Speciality physicians. Greater than 50% of this time was spent in counseling and or coordination of care.       Krysta Huggins, APRN-CNP           [1]   Allergies  Allergen Reactions    Gemfibrozil Other     Adverse reaction

## 2025-09-02 ENCOUNTER — APPOINTMENT (OUTPATIENT)
Dept: RADIOLOGY | Facility: HOSPITAL | Age: 80
End: 2025-09-02
Payer: MEDICARE

## 2025-09-02 ENCOUNTER — HOSPITAL ENCOUNTER (EMERGENCY)
Facility: HOSPITAL | Age: 80
Discharge: HOME | End: 2025-09-02
Attending: EMERGENCY MEDICINE
Payer: MEDICARE

## 2025-09-02 VITALS
HEART RATE: 62 BPM | TEMPERATURE: 97.2 F | DIASTOLIC BLOOD PRESSURE: 62 MMHG | OXYGEN SATURATION: 96 % | SYSTOLIC BLOOD PRESSURE: 114 MMHG | RESPIRATION RATE: 18 BRPM | BODY MASS INDEX: 24.1 KG/M2 | WEIGHT: 136 LBS | HEIGHT: 63 IN
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
## 2025-09-02 PROCEDURE — 73070 X-RAY EXAM OF ELBOW: CPT | Mod: RIGHT SIDE | Performed by: RADIOLOGY

## 2025-09-02 PROCEDURE — 71045 X-RAY EXAM CHEST 1 VIEW: CPT

## 2025-09-02 PROCEDURE — 72170 X-RAY EXAM OF PELVIS: CPT | Mod: FOREIGN READ | Performed by: STUDENT IN AN ORGANIZED HEALTH CARE EDUCATION/TRAINING PROGRAM

## 2025-09-02 PROCEDURE — 71045 X-RAY EXAM CHEST 1 VIEW: CPT | Mod: FOREIGN READ | Performed by: RADIOLOGY

## 2025-09-02 PROCEDURE — 70450 CT HEAD/BRAIN W/O DYE: CPT | Performed by: STUDENT IN AN ORGANIZED HEALTH CARE EDUCATION/TRAINING PROGRAM

## 2025-09-02 PROCEDURE — 72125 CT NECK SPINE W/O DYE: CPT

## 2025-09-02 PROCEDURE — 72170 X-RAY EXAM OF PELVIS: CPT

## 2025-09-02 PROCEDURE — 99284 EMERGENCY DEPT VISIT MOD MDM: CPT | Mod: 25 | Performed by: EMERGENCY MEDICINE

## 2025-09-02 PROCEDURE — 73070 X-RAY EXAM OF ELBOW: CPT | Mod: RT

## 2025-09-02 PROCEDURE — 72125 CT NECK SPINE W/O DYE: CPT | Performed by: STUDENT IN AN ORGANIZED HEALTH CARE EDUCATION/TRAINING PROGRAM

## 2025-09-02 PROCEDURE — 70450 CT HEAD/BRAIN W/O DYE: CPT

## 2025-09-02 ASSESSMENT — LIFESTYLE VARIABLES
HAVE YOU EVER FELT YOU SHOULD CUT DOWN ON YOUR DRINKING: NO
TOTAL SCORE: 0
EVER FELT BAD OR GUILTY ABOUT YOUR DRINKING: NO
EVER HAD A DRINK FIRST THING IN THE MORNING TO STEADY YOUR NERVES TO GET RID OF A HANGOVER: NO
HAVE PEOPLE ANNOYED YOU BY CRITICIZING YOUR DRINKING: NO

## 2025-09-02 ASSESSMENT — PAIN DESCRIPTION - ORIENTATION: ORIENTATION: RIGHT

## 2025-09-02 ASSESSMENT — PAIN - FUNCTIONAL ASSESSMENT
PAIN_FUNCTIONAL_ASSESSMENT: 0-10
PAIN_FUNCTIONAL_ASSESSMENT: 0-10

## 2025-09-02 ASSESSMENT — PAIN DESCRIPTION - LOCATION: LOCATION: ELBOW

## 2025-09-02 ASSESSMENT — PAIN SCALES - GENERAL
PAINLEVEL_OUTOF10: 4
PAINLEVEL_OUTOF10: 2